# Patient Record
Sex: MALE | Race: WHITE | NOT HISPANIC OR LATINO | URBAN - METROPOLITAN AREA
[De-identification: names, ages, dates, MRNs, and addresses within clinical notes are randomized per-mention and may not be internally consistent; named-entity substitution may affect disease eponyms.]

---

## 2018-02-09 ENCOUNTER — INPATIENT (INPATIENT)
Facility: HOSPITAL | Age: 63
LOS: 0 days | Discharge: HOME | DRG: 247 | End: 2018-02-10
Attending: INTERNAL MEDICINE | Admitting: INTERNAL MEDICINE
Payer: COMMERCIAL

## 2018-02-09 ENCOUNTER — TRANSCRIPTION ENCOUNTER (OUTPATIENT)
Age: 63
End: 2018-02-09

## 2018-02-09 ENCOUNTER — INPATIENT (INPATIENT)
Facility: HOSPITAL | Age: 63
LOS: 0 days | Discharge: ANOTHER HOSPITAL | DRG: 311 | End: 2018-02-09
Attending: FAMILY MEDICINE | Admitting: HOSPITALIST
Payer: COMMERCIAL

## 2018-02-09 VITALS — RESPIRATION RATE: 18 BRPM | HEART RATE: 90 BPM | OXYGEN SATURATION: 99 % | WEIGHT: 220.02 LBS | HEIGHT: 70 IN

## 2018-02-09 VITALS
OXYGEN SATURATION: 100 % | SYSTOLIC BLOOD PRESSURE: 117 MMHG | DIASTOLIC BLOOD PRESSURE: 65 MMHG | RESPIRATION RATE: 14 BRPM | TEMPERATURE: 99 F | HEART RATE: 77 BPM

## 2018-02-09 VITALS
HEART RATE: 79 BPM | RESPIRATION RATE: 18 BRPM | TEMPERATURE: 98 F | OXYGEN SATURATION: 100 % | SYSTOLIC BLOOD PRESSURE: 124 MMHG | WEIGHT: 220.02 LBS | HEIGHT: 70 IN | DIASTOLIC BLOOD PRESSURE: 86 MMHG

## 2018-02-09 DIAGNOSIS — I20.0 UNSTABLE ANGINA: ICD-10-CM

## 2018-02-09 DIAGNOSIS — E78.5 HYPERLIPIDEMIA, UNSPECIFIED: ICD-10-CM

## 2018-02-09 DIAGNOSIS — R55 SYNCOPE AND COLLAPSE: ICD-10-CM

## 2018-02-09 DIAGNOSIS — Z29.9 ENCOUNTER FOR PROPHYLACTIC MEASURES, UNSPECIFIED: ICD-10-CM

## 2018-02-09 DIAGNOSIS — I10 ESSENTIAL (PRIMARY) HYPERTENSION: ICD-10-CM

## 2018-02-09 LAB
ALBUMIN SERPL ELPH-MCNC: 4.2 G/DL — SIGNIFICANT CHANGE UP (ref 3.3–5)
ALP SERPL-CCNC: 50 U/L — SIGNIFICANT CHANGE UP (ref 40–120)
ALT FLD-CCNC: 121 U/L — HIGH (ref 12–78)
ANION GAP SERPL CALC-SCNC: 7 MMOL/L — SIGNIFICANT CHANGE UP (ref 5–17)
AST SERPL-CCNC: 50 U/L — HIGH (ref 15–37)
BILIRUB SERPL-MCNC: 0.7 MG/DL — SIGNIFICANT CHANGE UP (ref 0.2–1.2)
BUN SERPL-MCNC: 25 MG/DL — HIGH (ref 7–23)
CALCIUM SERPL-MCNC: 8.9 MG/DL — SIGNIFICANT CHANGE UP (ref 8.5–10.1)
CHLORIDE SERPL-SCNC: 100 MMOL/L — SIGNIFICANT CHANGE UP (ref 96–108)
CK SERPL-CCNC: 103 U/L — SIGNIFICANT CHANGE UP (ref 26–308)
CK SERPL-CCNC: 138 U/L — SIGNIFICANT CHANGE UP (ref 26–308)
CO2 SERPL-SCNC: 29 MMOL/L — SIGNIFICANT CHANGE UP (ref 22–31)
CREAT SERPL-MCNC: 1.3 MG/DL — SIGNIFICANT CHANGE UP (ref 0.5–1.3)
GLUCOSE SERPL-MCNC: 122 MG/DL — HIGH (ref 70–99)
HCT VFR BLD CALC: 42.4 % — SIGNIFICANT CHANGE UP (ref 39–50)
HGB BLD-MCNC: 14.9 G/DL — SIGNIFICANT CHANGE UP (ref 13–17)
INR BLD: 1.01 RATIO — SIGNIFICANT CHANGE UP (ref 0.88–1.16)
MCHC RBC-ENTMCNC: 31.5 PG — SIGNIFICANT CHANGE UP (ref 27–34)
MCHC RBC-ENTMCNC: 35.2 GM/DL — SIGNIFICANT CHANGE UP (ref 32–36)
MCV RBC AUTO: 89.5 FL — SIGNIFICANT CHANGE UP (ref 80–100)
PLATELET # BLD AUTO: 212 K/UL — SIGNIFICANT CHANGE UP (ref 150–400)
POTASSIUM SERPL-MCNC: 4.1 MMOL/L — SIGNIFICANT CHANGE UP (ref 3.5–5.3)
POTASSIUM SERPL-SCNC: 4.1 MMOL/L — SIGNIFICANT CHANGE UP (ref 3.5–5.3)
PROT SERPL-MCNC: 7.8 G/DL — SIGNIFICANT CHANGE UP (ref 6–8.3)
PROTHROM AB SERPL-ACNC: 11 SEC — SIGNIFICANT CHANGE UP (ref 9.8–12.7)
RBC # BLD: 4.74 M/UL — SIGNIFICANT CHANGE UP (ref 4.2–5.8)
RBC # FLD: 10.6 % — SIGNIFICANT CHANGE UP (ref 10.3–14.5)
SODIUM SERPL-SCNC: 136 MMOL/L — SIGNIFICANT CHANGE UP (ref 135–145)
TROPONIN I SERPL-MCNC: <.015 NG/ML — SIGNIFICANT CHANGE UP (ref 0.01–0.04)
TROPONIN I SERPL-MCNC: <.015 NG/ML — SIGNIFICANT CHANGE UP (ref 0.01–0.04)
WBC # BLD: 7 K/UL — SIGNIFICANT CHANGE UP (ref 3.8–10.5)
WBC # FLD AUTO: 7 K/UL — SIGNIFICANT CHANGE UP (ref 3.8–10.5)

## 2018-02-09 PROCEDURE — 93010 ELECTROCARDIOGRAM REPORT: CPT | Mod: 76

## 2018-02-09 PROCEDURE — 80053 COMPREHEN METABOLIC PANEL: CPT

## 2018-02-09 PROCEDURE — 93005 ELECTROCARDIOGRAM TRACING: CPT

## 2018-02-09 PROCEDURE — 84484 ASSAY OF TROPONIN QUANT: CPT

## 2018-02-09 PROCEDURE — 92928 PRQ TCAT PLMT NTRAC ST 1 LES: CPT | Mod: LD

## 2018-02-09 PROCEDURE — 99223 1ST HOSP IP/OBS HIGH 75: CPT | Mod: AI,GC

## 2018-02-09 PROCEDURE — 71045 X-RAY EXAM CHEST 1 VIEW: CPT

## 2018-02-09 PROCEDURE — 85027 COMPLETE CBC AUTOMATED: CPT

## 2018-02-09 PROCEDURE — 99285 EMERGENCY DEPT VISIT HI MDM: CPT

## 2018-02-09 PROCEDURE — 70450 CT HEAD/BRAIN W/O DYE: CPT

## 2018-02-09 PROCEDURE — 93458 L HRT ARTERY/VENTRICLE ANGIO: CPT | Mod: 26,59

## 2018-02-09 PROCEDURE — 93010 ELECTROCARDIOGRAM REPORT: CPT

## 2018-02-09 PROCEDURE — 92921: CPT | Mod: LD

## 2018-02-09 PROCEDURE — 71045 X-RAY EXAM CHEST 1 VIEW: CPT | Mod: 26

## 2018-02-09 PROCEDURE — 85610 PROTHROMBIN TIME: CPT

## 2018-02-09 PROCEDURE — 99285 EMERGENCY DEPT VISIT HI MDM: CPT | Mod: 25

## 2018-02-09 PROCEDURE — 82550 ASSAY OF CK (CPK): CPT

## 2018-02-09 PROCEDURE — 12345: CPT | Mod: NC

## 2018-02-09 PROCEDURE — 70450 CT HEAD/BRAIN W/O DYE: CPT | Mod: 26

## 2018-02-09 RX ORDER — CHLORTHALIDONE 50 MG
1 TABLET ORAL
Qty: 0 | Refills: 0 | DISCHARGE
Start: 2018-02-09

## 2018-02-09 RX ORDER — ATORVASTATIN CALCIUM 80 MG/1
1 TABLET, FILM COATED ORAL
Qty: 0 | Refills: 0 | COMMUNITY

## 2018-02-09 RX ORDER — LISINOPRIL 2.5 MG/1
1 TABLET ORAL
Qty: 0 | Refills: 0 | DISCHARGE
Start: 2018-02-09

## 2018-02-09 RX ORDER — CHLORTHALIDONE 50 MG
1 TABLET ORAL
Qty: 0 | Refills: 0 | COMMUNITY

## 2018-02-09 RX ORDER — ATORVASTATIN CALCIUM 80 MG/1
40 TABLET, FILM COATED ORAL AT BEDTIME
Qty: 0 | Refills: 0 | Status: DISCONTINUED | OUTPATIENT
Start: 2018-02-09 | End: 2018-02-09

## 2018-02-09 RX ORDER — LISINOPRIL 2.5 MG/1
40 TABLET ORAL DAILY
Qty: 0 | Refills: 0 | Status: DISCONTINUED | OUTPATIENT
Start: 2018-02-09 | End: 2018-02-09

## 2018-02-09 RX ORDER — CLOPIDOGREL BISULFATE 75 MG/1
75 TABLET, FILM COATED ORAL DAILY
Qty: 0 | Refills: 0 | Status: DISCONTINUED | OUTPATIENT
Start: 2018-02-09 | End: 2018-02-10

## 2018-02-09 RX ORDER — AMLODIPINE BESYLATE 2.5 MG/1
1 TABLET ORAL
Qty: 0 | Refills: 0 | DISCHARGE
Start: 2018-02-09

## 2018-02-09 RX ORDER — ASPIRIN/CALCIUM CARB/MAGNESIUM 324 MG
325 TABLET ORAL ONCE
Qty: 0 | Refills: 0 | Status: COMPLETED | OUTPATIENT
Start: 2018-02-09 | End: 2018-02-09

## 2018-02-09 RX ORDER — ASPIRIN/CALCIUM CARB/MAGNESIUM 324 MG
81 TABLET ORAL DAILY
Qty: 0 | Refills: 0 | Status: DISCONTINUED | OUTPATIENT
Start: 2018-02-10 | End: 2018-02-10

## 2018-02-09 RX ORDER — AMLODIPINE BESYLATE 2.5 MG/1
5 TABLET ORAL DAILY
Qty: 0 | Refills: 0 | Status: DISCONTINUED | OUTPATIENT
Start: 2018-02-09 | End: 2018-02-09

## 2018-02-09 RX ORDER — ASPIRIN/CALCIUM CARB/MAGNESIUM 324 MG
81 TABLET ORAL DAILY
Qty: 0 | Refills: 0 | Status: DISCONTINUED | OUTPATIENT
Start: 2018-02-09 | End: 2018-02-09

## 2018-02-09 RX ORDER — AMLODIPINE BESYLATE 2.5 MG/1
1 TABLET ORAL
Qty: 0 | Refills: 0 | COMMUNITY

## 2018-02-09 RX ORDER — CHLORTHALIDONE 50 MG
25 TABLET ORAL EVERY OTHER DAY
Qty: 0 | Refills: 0 | Status: DISCONTINUED | OUTPATIENT
Start: 2018-02-09 | End: 2018-02-09

## 2018-02-09 RX ORDER — ASPIRIN/CALCIUM CARB/MAGNESIUM 324 MG
1 TABLET ORAL
Qty: 0 | Refills: 0 | COMMUNITY
Start: 2018-02-09

## 2018-02-09 RX ORDER — LISINOPRIL 2.5 MG/1
1 TABLET ORAL
Qty: 0 | Refills: 0 | COMMUNITY

## 2018-02-09 RX ORDER — HYDROCHLOROTHIAZIDE 25 MG
0 TABLET ORAL
Qty: 0 | Refills: 0 | COMMUNITY

## 2018-02-09 RX ORDER — ASPIRIN/CALCIUM CARB/MAGNESIUM 324 MG
1 TABLET ORAL
Qty: 0 | Refills: 0 | COMMUNITY

## 2018-02-09 RX ADMIN — LISINOPRIL 40 MILLIGRAM(S): 2.5 TABLET ORAL at 07:37

## 2018-02-09 RX ADMIN — AMLODIPINE BESYLATE 5 MILLIGRAM(S): 2.5 TABLET ORAL at 07:38

## 2018-02-09 RX ADMIN — Medication 325 MILLIGRAM(S): at 04:26

## 2018-02-09 NOTE — ED ADULT NURSE NOTE - CHPI ED SYMPTOMS NEG
no vomiting/no back pain/no nausea/no chest pain/no diaphoresis/no fever/no chills/no cough/no syncope/no shortness of breath

## 2018-02-09 NOTE — H&P ADULT - NSHPREVIEWOFSYSTEMS_GEN_ALL_CORE
Constitutional: denies fever, chills, diaphoresis   HEENT: denies blurry vision, difficulty hearing  Respiratory: denies SOB, LANGFORD, cough, sputum production, wheezing, hemoptysis  Cardiovascular: +palpitations denies CP, edema  Gastrointestinal: denies nausea, vomiting, diarrhea, constipation, abdominal pain, melena, hematochezia   Genitourinary: denies dysuria, frequency, urgency, hematuria   Skin/Breast: denies rash, itching  Musculoskeletal: denies myalgias, joint swelling  Neurologic: +leg weakness, denies headache, dizziness, paresthesias, numbness/tingling

## 2018-02-09 NOTE — DISCHARGE NOTE ADULT - ADDITIONAL INSTRUCTIONS
Follow up with your cardiologist and primary care provider in 1-2 weeks. Do not stop you Aspirin or Plavix unless instructed to do so by your cardiologist.   Call the Cardiology office [phone] to confirm your next catheterization date  Follow up with your cardiologist and primary care provider in 1-2 weeks.

## 2018-02-09 NOTE — DISCHARGE NOTE ADULT - PATIENT PORTAL LINK FT
You can access the [facility] Patient Portal, offered by [facility], by registering with the following website: [email or web address]

## 2018-02-09 NOTE — DISCHARGE NOTE ADULT - HOSPITAL COURSE
62 year old male with PMH of HTN, fatty Liver, gout presents to [facility] ED on 2/9/18 with chest discomfort and disorientation, admitted with r/o ACS, and presyncope   CT head neg, troponin x 2 Negative. 62 year old male with PMH of HTN, fatty Liver, gout presents to [facility] ED on 2/9/18 with chest discomfort and disorientation, admitted with r/o ACS, and presyncope   CT head neg, troponin x 2 Negative.     2/9 cardiac cath with one prox LAD stent, one mid LAD stent, POBA to the D1.

## 2018-02-09 NOTE — H&P ADULT - ASSESSMENT
62 year old male with PMH of HTN, fatty Liver, gout presents with chest discomfort and disorientation, a/w palpitations r/o ACS, and presyncope

## 2018-02-09 NOTE — H&P ADULT - ATTENDING COMMENTS
61yo m who recently got a cardiac workup recently being admitted for near syncope.  During his cardiac workup in [place], he did have Vtach and Aflutter per patient.  Cardio consulted to evaluate patient here. Echo ordered. Trending troponins.   Previous cardiac workup results can be obtained by Halima Lucas at Work number [phone] or Cell [phone]. 63yo m who recently got a cardiac workup recently being admitted for near syncope.  During his cardiac workup in [place], he did have Vtach and Aflutter per patient.  Cardio consulted to evaluate patient here. Echo ordered. Trending troponins.   Previous cardiac workup results can be obtained from his nurse Halima Lucas at Work number [phone] or Cell [phone].

## 2018-02-09 NOTE — DISCHARGE NOTE ADULT - CARE PROVIDER_API CALL
Rufus Lemus (MD), Cardiovascular Disease  [address]  3rd [place] [address]  Phone: [phone]  Fax: [phone]    Jett Frey (MD), Cardiology; Internal Medicine; Interventional Cardiology  [facility] Dept of Cardiology  [address] [place] [address]  Phone: [phone]  Fax: [phone]

## 2018-02-09 NOTE — DISCHARGE NOTE ADULT - CARE PROVIDER_API CALL
Nathen Anthony), Cardiovascular Disease; Interventional Cardiology  [address] [place] [address]  Phone: [phone]  Fax: [phone]    Jusitn White), Infectious Disease; Internal Medicine  [address] [place] [address]  3rd [place] [address]  Phone: [phone]  Fax: [phone]

## 2018-02-09 NOTE — ED PROVIDER NOTE - OBJECTIVE STATEMENT
61 y/o WF C/O palpitations x 5 minutes and near syncope. He experienced blurry vision and left arm weakness. The symptoms occurred two hours ago while he was in [place]. He drove to  where he resides and his symptoms are resolved. No chest pain, no SOB, no Headache, No focal neuro change. He took ASA 81mg. On 2/6 he  had a stress test and at max /min his cardiologist informed him he developed non sustained V-Tach, 5 beats and single  couplet. He stated that he was asymptomatic during those episodes.

## 2018-02-09 NOTE — H&P ADULT - HISTORY OF PRESENT ILLNESS
62 year old male with PMH of HTN, fatty Liver, gout presents with chest discomfort and disorientation. Patient was lying on the couch when he began feeling chest discomfort, and experiencing blurry vision, denied pain, denied association with breathing. Patient described discomfort as though his heart was racing, w/ diaphoresis and urgency to use bathroom, reports sitting down to urinate on the toilet bowl and having an unplanned/uncontrolled bowel movement. Stood up and felt disoriented, legs and arms felt weak, and he noticed he was feeling out of breath. Denied falling, head trauma, LOC, dizziness. 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 As per pt during the third stage of a cardiac stress test in which patient's heart rate was between 150-180 he was found to have two episodes of Vtach (2 beats, and 5 beats), and briefly was in atrial flutter. His echocardiogram did not reveal any abnormalities although his carotid dopplers showed mildly calcified plaque deposits b/l. CT scan showed fatty liver, calcified aorta and normal kidneys. Patient's cholesterol and triglycerides were found to be elevated and he was started on a statin, with the first dose taken today. After the stress test a week ago patient noted feeling exhausted but did not notice any symptoms until yesterday when intermittently throughout the day he felt the same chest discomfort he reports today, without associated diaphoresis, LANGFORD, LE weakness which all started today. Currently patient reports all symptoms have resolved, denies CP, SOB, fever, chills, n/v/d  In ED vitals were HR: 79 BP: 152/68 RR: 20 SpO2: 98%   Labs sig for AST 50, , troponin negative, CK wnl  CT head No acute intracranial hemorrhage, mass effect, or evidence of acute   vascular territorial infarction.  CXR: No acute infectious process- unoffcial read  EKG: NSR @ 84bpm  In ED was given aspirin 325

## 2018-02-09 NOTE — H&P ADULT - NSHPPHYSICALEXAM_GEN_ALL_CORE
Physical Exam:  General: Well developed, well nourished, NAD  HEENT: NCAT, PERRLA, EOMI bl, moist mucous membranes   Neck: Supple, nontender, no mass  Neurology: A&Ox3, nonfocal, CN II-XII grossly intact  Respiratory: CTA B/L, No W/R/R  CV: RRR, +S1/S2, no murmurs, rubs or gallops  Abdominal: Soft, NT, ND +BSx4  Extremities: No C/C/E, + peripheral pulses  MSK: No joint erythema or warmth, no joint swelling   Skin: warm, dry, normal color, no rash or abnormal lesions

## 2018-02-09 NOTE — CONSULT NOTE ADULT - SUBJECTIVE AND OBJECTIVE BOX
CHIEF COMPLAINT: Patient is a 62y old  Male who presents with a chief complaint of chest discomfort and disorientation (09 Feb 2018 04:20)      HPI:  62 year old male with PMH of HTN, fatty Liver, gout presents with chest discomfort and disorientation. Patient was lying on the couch when he began feeling chest discomfort, and experiencing blurry vision, denied pain, denied association with breathing. Patient described discomfort as though his heart was racing, w/ diaphoresis and urgency to use bathroom, reports sitting down to urinate on the toilet bowl and having an unplanned/uncontrolled bowel movement.  legs and arms felt weak, and felt heart racing for several minutes. Denied falling, head trauma, LOC, dizziness. 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 As per pt during the third stage of a cardiac stress test in which patient's heart rate was between 150-180 he was found to have two episodes of Vtach (2 beats, and 5 beats), and briefly was in atrial flutter. He denies rest or exertional chest pressure/tightness/heaviness, dyspnea, edema His echocardiogram did not reveal any abnormalities although his carotid dopplers showed mildly calcified plaque deposits b/l. CT scan showed fatty liver, calcified aorta and normal kidneys. Patient's cholesterol and triglycerides were found to be elevated and he was started on a statin, with the first dose taken today.  Currently patient reports all symptoms have resolved, denies CP, SOB, fever, chills, n/v/d  In ED vitals were HR: 79 BP: 152/68 RR: 20 SpO2: 98%   Labs sig for AST 50, , troponin negative, CK wnl  CT head No acute intracranial hemorrhage, mass effect, or evidence of acute   vascular territorial infarction.  CXR: No acute infectious process- unoffcial read  EKG: NSR @ 84bpm  In ED was given aspirin 325 (09 Feb 2018 04:20)      PAST MEDICAL & SURGICAL HISTORY:  Gout attack  Fatty liver  Hypertension  No significant past surgical history      SOCIAL HISTORY:    FAMILY HISTORY: FAMILY HISTORY:  Family history of cerebrovascular accident (CVA) (Father)  Family history of cardiac pacemaker (Sibling)  Family history of acute myocardial infarction (Father, Mother)      MEDICATIONS  (STANDING):  amLODIPine   Tablet 5 milliGRAM(s) Oral daily  aspirin enteric coated 81 milliGRAM(s) Oral daily  atorvastatin 40 milliGRAM(s) Oral at bedtime  chlorthalidone 25 milliGRAM(s) Oral every other day  lisinopril 40 milliGRAM(s) Oral daily    MEDICATIONS  (PRN):      Allergies    No Known Allergies    Intolerances        REVIEW OF SYSTEMS:    CONSTITUTIONAL: No weakness, fevers or chills  EYES: No visual changes, No diplopia  NECK: No pain or stiffness  RESPIRATORY: No cough, wheezing, hemoptysis; No shortness of breath  CARDIOVASCULAR: No chest pain  GASTROINTESTINAL: No abdominal pain. No nausea, vomiting, or hematemesis; No diarrhea or constipation. No melena or hematochezia.one uncontrolled BM  GENITOURINARY: No dysuria, frequency or hematuria  NEUROLOGICAL: No numbness or weakness  SKIN: No itching or rash  All other review of systems is negative unless indicated above    VITAL SIGNS:   Vital Signs Last 24 Hrs  T(C): 36.9 (09 Feb 2018 06:18), Max: 36.9 (09 Feb 2018 06:18)  T(F): 98.5 (09 Feb 2018 06:18), Max: 98.5 (09 Feb 2018 06:18)  HR: 64 (09 Feb 2018 07:35) (64 - 90)  BP: 114/42 (09 Feb 2018 07:35) (114/42 - 152/68)  BP(mean): --  RR: 15 (09 Feb 2018 07:35) (15 - 20)  SpO2: 97% (09 Feb 2018 07:35) (97% - 99%)    I&O's Summary      PHYSICAL EXAM:    Constitutional: NAD, awake and alert, well-developed  Eyes:  EOMI,  Pupils round, no lesions  Pulmonary: Non-labored, breath sounds are clear bilaterally, No wheezing, rales or rhonchi  Cardiovascular: PMI not palpable non-displaced Regular S1 and S2, no murmurs, rubs, gallops or clicks  Gastrointestinal: Bowel Sounds present, soft, nontender.   Extremities: No lower extremity clubbing ,cyanosis or edema  Lymph: No peripheral edema. No cervical lymphadenopathy.  Neurological: Alert, no focal deficits  Skin: No rashes.  No cyanosis.  Psych:  Mood & affect appropriate     LABS: All Labs Reviewed:                        14.9   7.0   )-----------( 212      ( 09 Feb 2018 02:43 )             42.4     09 Feb 2018 02:43    136    |  100    |  25     ----------------------------<  122    4.1     |  29     |  1.30     Ca    8.9        09 Feb 2018 02:43    TPro  7.8    /  Alb  4.2    /  TBili  0.7    /  DBili  x      /  AST  50     /  ALT  121    /  AlkPhos  50     09 Feb 2018 02:43    PT/INR - ( 09 Feb 2018 02:43 )   PT: 11.0 sec;   INR: 1.01 ratio           CARDIAC MARKERS ( 09 Feb 2018 02:43 )  <.015 ng/mL / x     / 138 U/L / x     / x          Blood Culture:       EKG:  nsr 84bpm, normal axis/intervals-normal CHIEF COMPLAINT: Patient is a 62y old  Male who presents with a chief complaint of chest discomfort and disorientation (09 Feb 2018 04:20)      HPI:  62 year old male with PMH of HTN, fatty Liver, gout presents with chest discomfort and disorientation. Patient was lying on the couch when he began feeling chest discomfort, and experiencing blurry vision, denied pain, denied association with breathing. Patient described discomfort as though his heart was racing, w/ diaphoresis and urgency to use bathroom, reports sitting down to urinate on the toilet bowl and having an unplanned/uncontrolled bowel movement.  legs and arms felt weak, and felt heart racing for several minutes. Denied falling, head trauma, LOC, dizziness. 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 As per pt during the third stage of a cardiac stress test in which patient's heart rate was between 150-180 he was found to have two episodes of Vtach (2 beats, and 5 beats), and briefly was in atrial flutter. He denies rest or exertional chest pressure/tightness/heaviness, dyspnea, edema His echocardiogram did not reveal any abnormalities although his carotid dopplers showed mildly calcified plaque deposits b/l. CT scan showed fatty liver, calcified aorta and normal kidneys. Patient's cholesterol and triglycerides were found to be elevated and he was started on a statin, with the first dose taken today.  Currently patient reports all symptoms have resolved, denies CP, SOB, fever, chills, n/v/d. While driving home from [place] for 1.5hr he felt weakness in both legs and arms and "irritation" feeling in chest    In ED vitals were HR: 79 BP: 152/68 RR: 20 SpO2: 98%   Labs sig for AST 50, , troponin negative, CK wnl  CT head No acute intracranial hemorrhage, mass effect, or evidence of acute   vascular territorial infarction.  CXR: No acute infectious process- unoffcial read  EKG: NSR @ 84bpm  In ED was given aspirin 325 (09 Feb 2018 04:20)      PAST MEDICAL & SURGICAL HISTORY:  Gout attack  Fatty liver  Hypertension  No significant past surgical history      SOCIAL HISTORY:    FAMILY HISTORY: FAMILY HISTORY:  Family history of cerebrovascular accident (CVA) (Father)  Family history of cardiac pacemaker (Sibling)  Family history of acute myocardial infarction (Father, Mother)      MEDICATIONS  (STANDING):  amLODIPine   Tablet 5 milliGRAM(s) Oral daily  aspirin enteric coated 81 milliGRAM(s) Oral daily  atorvastatin 40 milliGRAM(s) Oral at bedtime  chlorthalidone 25 milliGRAM(s) Oral every other day  lisinopril 40 milliGRAM(s) Oral daily    MEDICATIONS  (PRN):      Allergies    No Known Allergies    Intolerances        REVIEW OF SYSTEMS:    CONSTITUTIONAL: No weakness, fevers or chills  EYES: No visual changes, No diplopia  NECK: No pain or stiffness  RESPIRATORY: No cough, wheezing, hemoptysis; No shortness of breath  CARDIOVASCULAR: No chest pain  GASTROINTESTINAL: No abdominal pain. No nausea, vomiting, or hematemesis; No diarrhea or constipation. No melena or hematochezia.one uncontrolled BM  GENITOURINARY: No dysuria, frequency or hematuria  NEUROLOGICAL: No numbness or weakness  SKIN: No itching or rash  All other review of systems is negative unless indicated above    VITAL SIGNS:   Vital Signs Last 24 Hrs  T(C): 36.9 (09 Feb 2018 06:18), Max: 36.9 (09 Feb 2018 06:18)  T(F): 98.5 (09 Feb 2018 06:18), Max: 98.5 (09 Feb 2018 06:18)  HR: 64 (09 Feb 2018 07:35) (64 - 90)  BP: 114/42 (09 Feb 2018 07:35) (114/42 - 152/68)  BP(mean): --  RR: 15 (09 Feb 2018 07:35) (15 - 20)  SpO2: 97% (09 Feb 2018 07:35) (97% - 99%)    I&O's Summary      PHYSICAL EXAM:    Constitutional: NAD, awake and alert, well-developed  Eyes:  EOMI,  Pupils round, no lesions  Pulmonary: Non-labored, breath sounds are clear bilaterally, No wheezing, rales or rhonchi  Cardiovascular: PMI not palpable non-displaced Regular S1 and S2, no murmurs, rubs, gallops or clicks  Gastrointestinal: Bowel Sounds present, soft, nontender.   Extremities: No lower extremity clubbing ,cyanosis or edema  Lymph: No peripheral edema. No cervical lymphadenopathy.  Neurological: Alert, no focal deficits  Skin: No rashes.  No cyanosis.  Psych:  Mood & affect appropriate     LABS: All Labs Reviewed:                        14.9   7.0   )-----------( 212      ( 09 Feb 2018 02:43 )             42.4     09 Feb 2018 02:43    136    |  100    |  25     ----------------------------<  122    4.1     |  29     |  1.30     Ca    8.9        09 Feb 2018 02:43    TPro  7.8    /  Alb  4.2    /  TBili  0.7    /  DBili  x      /  AST  50     /  ALT  121    /  AlkPhos  50     09 Feb 2018 02:43    PT/INR - ( 09 Feb 2018 02:43 )   PT: 11.0 sec;   INR: 1.01 ratio           CARDIAC MARKERS ( 09 Feb 2018 02:43 )  <.015 ng/mL / x     / 138 U/L / x     / x          Blood Culture:       EKG:  nsr 84bpm, normal axis/intervals-normal

## 2018-02-09 NOTE — H&P ADULT - FAMILY HISTORY
Father  Still living? Unknown  Family history of acute myocardial infarction, Age at diagnosis: Age Unknown  Family history of cerebrovascular accident (CVA), Age at diagnosis: Age Unknown     Mother  Still living? Unknown  Family history of acute myocardial infarction, Age at diagnosis: Age Unknown     Sibling  Still living? Unknown  Family history of cardiac pacemaker, Age at diagnosis: Age Unknown

## 2018-02-09 NOTE — H&P ADULT - NSHPSOCIALHISTORY_GEN_ALL_CORE
Lives at home with wife  Works in [email or web address] in [place]  Does not smoke, used to drink more heavily but now once or twice a week

## 2018-02-09 NOTE — DISCHARGE NOTE ADULT - MEDICATION SUMMARY - MEDICATIONS TO TAKE
I will START or STAY ON the medications listed below when I get home from the hospital:    aspirin 81 mg oral delayed release tablet  -- 1 tab(s) by mouth once a day  home/hospital  -- Indication: For Helping keep stented coronary arteries open    lisinopril 40 mg oral tablet  -- 1 tab(s) by mouth once a day  home/hospital  -- Indication: For Hypertension    atorvastatin 40 mg oral tablet  -- 1 tab(s) by mouth once a day (at bedtime)  home/hospital  -- Indication: For hyperlipidemia    clopidogrel 75 mg oral tablet  -- 1 tab(s) by mouth once a day  -- Indication: For Helping keep stented coronary arteries open    amLODIPine 5 mg oral tablet  -- 1 tab(s) by mouth once a day  home/hospital  -- Indication: For Hypertension    chlorthalidone 25 mg oral tablet  -- 1 tab(s) by mouth every other day  home/hospital  -- Indication: For Hypertension

## 2018-02-09 NOTE — PATIENT PROFILE ADULT. - NS PRO PT RIGHT SUPPORT PERSON
Detail Level: Simple
Add 27299 Cpt? (Important Note: In 2017 The Use Of 13379 Is Being Tracked By Cms To Determine Future Global Period Reimbursement For Global Periods): no
same name as above

## 2018-02-09 NOTE — DISCHARGE NOTE ADULT - CARE PLAN
Principal Discharge DX:	Chest discomfort  Goal:	possible unstable   angina / on asa , statin  Assessment and plan of treatment:	cardiac cath by  [facility]  Secondary Diagnosis:	Hypertension  Assessment and plan of treatment:	on meds stable  Secondary Diagnosis:	Near syncope  Assessment and plan of treatment:	resolved  Secondary Diagnosis:	HLD (hyperlipidemia)  Assessment and plan of treatment:	on meds

## 2018-02-09 NOTE — DISCHARGE NOTE ADULT - CARE PLAN
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  Assessment and plan of treatment:	Do not stop you Aspirin or Plavix unless instructed to do so by your cardiologist.   No heavy lifting or pushing/pulling with procedure arm for 2 weeks. No driving for 2 days. You may shower 24 hours following the procedure but avoid baths/swimming for 1 week. Check your wrist site for bleeding and/or swelling daily following procedure and call your doctor immediately if it occurs or if you experience increased pain at the site. Follow up with your cardiologist in 1-2 weeks. You may call [facility] if you have any questions/concerns regarding your procedure [phone].  Secondary Diagnosis:	Hypertension, unspecified type  Goal:	Your blood pressure will be controlled.  Assessment and plan of treatment:	Continue with your blood pressure medications; eat a heart healthy diet with low salt diet; exercise regularly (consult with your physician or cardiologist first); maintain a heart healthy weight; if you smoke - quit (A resource to help you stop smoking is the [employer] – phone number 792-789-5534.); include healthy ways to manage stress. Continue to follow with your primary care physician or cardiologist.  Secondary Diagnosis:	Hyperlipidemia, unspecified hyperlipidemia type  Goal:	Your LDL cholesterol will be less than 70mg/dL  Assessment and plan of treatment:	Continue with your cholesterol medications. Eat a heart healthy diet that is low in saturated fats and salt, and includes whole grains, fruits, vegetables and lean protein; exercise regularly (consult with your physician or cardiologist first); maintain a heart healthy weight; if you smoke - quit (A resource to help you stop smoking is the [employer] Tobacco Control – phone number 539-930-8554.). Continue to follow with your primary physician or cardiologist.

## 2018-02-09 NOTE — H&P ADULT - PROBLEM SELECTOR PLAN 4
EOMI; PERRL; no drainage or redness DVT ppx: SCD  IMPROVE VTE Individual Risk Assessment          RISK                                                          Points  [  ] Previous VTE                                                3  [  ] Thrombophilia                                             2  [  ] Lower limb paralysis                                   2        (unable to hold up >15 seconds)    [  ] Current Cancer                                             2         (within 6 months)  [  ] Immobilization > 24 hrs                              1  [  ] ICU/CCU stay > 24 hours                             1  [ X ] Age > 60                                                         1    IMPROVE VTE Score: 1

## 2018-02-09 NOTE — H&P CARDIOLOGY - PMH
Fatty liver    Gout attack    Hypertension Fatty liver    Gout attack    HLD (hyperlipidemia)    Hypertension    Renal stones

## 2018-02-09 NOTE — DISCHARGE NOTE ADULT - ADDITIONAL INSTRUCTIONS
pmd dr adamson notified dc / transfer plan - fu in 1wk after dc from [place] . transfer to [place] for cardiac cath - under dr armen severino  service.

## 2018-02-09 NOTE — DISCHARGE NOTE ADULT - CARE PROVIDERS DIRECT ADDRESSES
,DirectAddress_Unknown,[email or web address]

## 2018-02-09 NOTE — H&P ADULT - PROBLEM SELECTOR PLAN 1
Admit to TELE  CT head neg, troponin 1st set neg  Keep BP within therapeutic range  check orthostatics  fall precautions  TTE and carotid dopplers  Cardiac consult Dr. Lemus, recs appreciated Admit to TELE  CT head neg, troponin 1st set neg  Keep BP within therapeutic range  check orthostatics  fall precautions  TTE  Cardiac consult Dr. Lemus, recs appreciated

## 2018-02-09 NOTE — ED ADULT NURSE REASSESSMENT NOTE - NS ED NURSE REASSESS COMMENT FT1
patient to be transferred as per phone call from Cath Lab at [place] arrangements made by Dr. Lemus nurse not aware of transfer no transfer paper work done Dr Lemus called. Report given to Dasia who states they should be picking patient up about 10:30 patient to remain NPO except meds
report from previous shift patient sleeping easily awakened pt admitted awaiting bed assignment

## 2018-02-09 NOTE — DISCHARGE NOTE ADULT - PATIENT PORTAL LINK FT
You can access the [facility], offered by [facility], by registering with the following website: [email or web address]

## 2018-02-09 NOTE — DISCHARGE NOTE ADULT - PLAN OF CARE
possible unstable   angina / on asa , statin cardiac cath by  [facility] on meds stable resolved on meds

## 2018-02-09 NOTE — ED ADULT NURSE NOTE - OBJECTIVE STATEMENT
62 year old male presents to the ED with irregular heart beat. A+O x 4. States he was watching tv when he suddenly "didn't feel right." Unable to focus, became dizzy, felt palpitations. Had a stress test this past week on tuesday 2/6/18. Was told he had 2 episodes of v-tach. One episode with 2 beats, the other with 5 beats. Pt states he was asymptomatic during these v-tach episodes. Pt reports palpitations of the head, but denies headache or dizziness now. Reports he still feels jittery. S1/S2. NSR on the monitor. Lungs clear oliva. Resp even and unlabored. skin flushed but warm and intact. no swelling/ edema noted oliva LE's. abdomen obese but soft and non tender. PMH of HTN and hyperlipidemia.

## 2018-02-09 NOTE — ED PROVIDER NOTE - CARE PLAN
Principal Discharge DX:	Near syncope  Secondary Diagnosis:	TIA (transient ischemic attack)  Secondary Diagnosis:	Palpitations

## 2018-02-09 NOTE — CONSULT NOTE ADULT - ASSESSMENT
no chest pain, EKG changes or cardiac enzyme elevation-no evidence of ACS  r/o arrhythmia- arranged 30 day home telemetry monitoring as out patient  weakness in extremities without syncope.   f/u with me in office within 3-4 days of discharge  recommend to ambulate pt. If stable, he will f/u as out pt no chest pain, EKG changes or cardiac enzyme elevation-no evidence of ACS  unclear etiology of fatigue, weakness in all extremities and "irritation" in chest  risk vs benefit d/w pt. All questions answered.  arranged cardiac cath at [facility] today.  If cath is negative, will arrange home telemetry monitoring as out pt

## 2018-02-09 NOTE — DISCHARGE NOTE ADULT - MEDICATION SUMMARY - MEDICATIONS TO TAKE
I will START or STAY ON the medications listed below when I get home from the hospital:    aspirin 81 mg oral delayed release tablet  -- 1 tab(s) by mouth once a day  -- Indication: For unstable angina     lisinopril 40 mg oral tablet  -- 1 tab(s) by mouth once a day  -- Indication: For Hypertension    atorvastatin 40 mg oral tablet  -- 1 tab(s) by mouth once a day (at bedtime)  -- Indication: For HL d    amLODIPine 5 mg oral tablet  -- 1 tab(s) by mouth once a day  -- Indication: For Htn    chlorthalidone 25 mg oral tablet  -- 1 tab(s) by mouth every other day  -- Indication: For Htn

## 2018-02-09 NOTE — H&P CARDIOLOGY - HISTORY OF PRESENT ILLNESS
62 year old male with PMH of HTN, fatty Liver, gout presents to [facility] on 2/9/18 with chest discomfort and disorientation. Patient was lying on the couch when he began feeling chest discomfort, and experiencing blurry vision, denied pain, denied association with breathing. Patient described discomfort as though his heart was racing, w/ diaphoresis and urgency to use bathroom, reports sitting down to urinate on the toilet bowl and having an unplanned/uncontrolled bowel movement. Stood up and felt disoriented, legs and arms felt weak, and he noticed he was feeling out of breath. Denied falling, head trauma, LOC, dizziness.     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 As per pt during the third stage of a cardiac stress test in which patient's heart rate was between 150-180 he was found to have two episodes of Vtach (2 beats, and 5 beats), and briefly was in atrial flutter. His echocardiogram did not reveal any abnormalities although his carotid dopplers showed mildly calcified plaque deposits b/l.     CT scan showed fatty liver, calcified aorta and normal kidneys. Patient's cholesterol and triglycerides were found to be elevated and he was started on a statin, with the first dose taken today. After the stress test a week ago patient noted feeling exhausted but did not notice any symptoms until yesterday when intermittently throughout the day he felt the same chest discomfort he reports today, without associated diaphoresis, LANGFORD, LE weakness which all started today. Currently patient reports all symptoms have resolved, denies CP, SOB, fever, chills, n/v/d    CT head No acute intracranial hemorrhage, mass effect, or evidence of acute   vascular territorial infarction.  CXR: No acute infectious process    Pt was transferred to [facility] for Cardiac Cath. 62 year old male with PMH of HTN, fatty Liver, gout presents to [facility] on 2/9/18 with chest discomfort and disorientation. Patient was lying on the couch when he began feeling chest discomfort, and experiencing blurry vision, denied pain, denied association with breathing. Patient described discomfort as though his heart was racing, w/ diaphoresis and urgency to use bathroom, reports sitting down to urinate on the toilet bowl and having an unplanned/uncontrolled bowel movement. Stood up and felt disoriented, legs and arms felt weak, and he noticed he was feeling out of breath. Denied falling, head trauma, LOC, dizziness.     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 As per pt during the third stage of a cardiac stress test in which patient's heart rate was between 150-180 he was found to have two episodes of Vtach (2 beats, and 5 beats), and briefly was in atrial flutter. His echocardiogram did not reveal any abnormalities although his carotid dopplers showed mildly calcified plaque deposits b/l.     CT scan showed fatty liver, calcified aorta and normal kidneys. Patient's cholesterol and triglycerides were found to be elevated and he was started on a statin, with the first dose taken today. After the stress test a week ago patient noted feeling exhausted but did not notice any symptoms until yesterday when intermittently throughout the day he felt the same chest discomfort he reports today, without associated diaphoresis, LANGFORD, LE weakness which all started today. Currently patient reports all symptoms have resolved, denies CP, SOB, fever, chills, n/v/d    CT head No acute intracranial hemorrhage, mass effect, or evidence of acute   vascular territorial infarction.  CXR: No acute infectious process    Pt was transferred to [facility] for Cardiac Cath.     Cardiologist: Dr Lemus

## 2018-02-09 NOTE — DISCHARGE NOTE ADULT - HOSPITAL COURSE
62 year old male with PMH of HTN, fatty Liver, gout presents with chest discomfort and disorientation,  admitted with ro ACS, and presyncope- hosp course     ·Near syncope possible cardiac origin with chest discomfort secondary to  possible  unstable angina   CT head neg, troponin 1st set neg  check orthostatics  fall precautions  TTE  pending     Cardiac consult Dr. Lemus,  saw pt next day as per him will need cardiac cath for unstable angina  , on asa , statin  ,  transfer [facility].  physical exam  2-9-18 dc   Vital Signs Last 24 Hrs  T(C): 36.9 (09 Feb 2018 06:18), Max: 36.9 (09 Feb 2018 06:18)  T(F): 98.5 (09 Feb 2018 06:18), Max: 98.5 (09 Feb 2018 06:18)  HR: 64 (09 Feb 2018 07:35) (64 - 90)  BP: 114/42 (09 Feb 2018 07:35) (114/42 - 152/68)  BP(mean): --  RR: 15 (09 Feb 2018 07:35) (15 - 20)  SpO2: 97% (09 Feb 2018 07:35) (97% - 99%)GEN no distress , HEENT nt/nc , perrla , CVS s1s2 no tachy , CHEST bl air entery  present  , CNS aao/3 , GI soft , bs present ,  intact , EXT no edema, pedal pulse present , SKIN no rash.      pmd dr adamson notified dc / transfer plan in length . cardio dr Cary to fu out pt .

## 2018-02-09 NOTE — DISCHARGE NOTE ADULT - PLAN OF CARE
Patient remains chest pain free and understands post cath discharge instructions. Do not stop you Aspirin or Plavix unless instructed to do so by your cardiologist.   No heavy lifting or pushing/pulling with procedure arm for 2 weeks. No driving for 2 days. You may shower 24 hours following the procedure but avoid baths/swimming for 1 week. Check your wrist site for bleeding and/or swelling daily following procedure and call your doctor immediately if it occurs or if you experience increased pain at the site. Follow up with your cardiologist in 1-2 weeks. You may call [facility] if you have any questions/concerns regarding your procedure [phone]. Your blood pressure will be controlled. Continue with your blood pressure medications; eat a heart healthy diet with low salt diet; exercise regularly (consult with your physician or cardiologist first); maintain a heart healthy weight; if you smoke - quit (A resource to help you stop smoking is the [facility] – phone number [phone].); include healthy ways to manage stress. Continue to follow with your primary care physician or cardiologist. Your LDL cholesterol will be less than 70mg/dL Continue with your cholesterol medications. Eat a heart healthy diet that is low in saturated fats and salt, and includes whole grains, fruits, vegetables and lean protein; exercise regularly (consult with your physician or cardiologist first); maintain a heart healthy weight; if you smoke - quit (A resource to help you stop smoking is the [facility] – phone number [phone].). Continue to follow with your primary physician or cardiologist.

## 2018-02-10 VITALS
TEMPERATURE: 98 F | DIASTOLIC BLOOD PRESSURE: 65 MMHG | OXYGEN SATURATION: 98 % | SYSTOLIC BLOOD PRESSURE: 115 MMHG | HEART RATE: 71 BPM | RESPIRATION RATE: 17 BRPM

## 2018-02-10 DIAGNOSIS — I25.118 ATHEROSCLEROTIC HEART DISEASE OF NATIVE CORONARY ARTERY WITH OTHER FORMS OF ANGINA PECTORIS: ICD-10-CM

## 2018-02-10 DIAGNOSIS — E78.5 HYPERLIPIDEMIA, UNSPECIFIED: ICD-10-CM

## 2018-02-10 DIAGNOSIS — I10 ESSENTIAL (PRIMARY) HYPERTENSION: ICD-10-CM

## 2018-02-10 LAB
ANION GAP SERPL CALC-SCNC: 15 MMOL/L — SIGNIFICANT CHANGE UP (ref 5–17)
BASOPHILS # BLD AUTO: 0 K/UL — SIGNIFICANT CHANGE UP (ref 0–0.2)
BASOPHILS NFR BLD AUTO: 0.5 % — SIGNIFICANT CHANGE UP (ref 0–2)
BUN SERPL-MCNC: 24 MG/DL — HIGH (ref 7–23)
CALCIUM SERPL-MCNC: 9.1 MG/DL — SIGNIFICANT CHANGE UP (ref 8.4–10.5)
CHLORIDE SERPL-SCNC: 100 MMOL/L — SIGNIFICANT CHANGE UP (ref 96–108)
CO2 SERPL-SCNC: 24 MMOL/L — SIGNIFICANT CHANGE UP (ref 22–31)
CREAT SERPL-MCNC: 1.38 MG/DL — HIGH (ref 0.5–1.3)
EOSINOPHIL # BLD AUTO: 0.2 K/UL — SIGNIFICANT CHANGE UP (ref 0–0.5)
EOSINOPHIL NFR BLD AUTO: 2.5 % — SIGNIFICANT CHANGE UP (ref 0–6)
GLUCOSE SERPL-MCNC: 122 MG/DL — HIGH (ref 70–99)
HCT VFR BLD CALC: 39.8 % — SIGNIFICANT CHANGE UP (ref 39–50)
HGB BLD-MCNC: 14.5 G/DL — SIGNIFICANT CHANGE UP (ref 13–17)
LYMPHOCYTES # BLD AUTO: 1.6 K/UL — SIGNIFICANT CHANGE UP (ref 1–3.3)
LYMPHOCYTES # BLD AUTO: 21.2 % — SIGNIFICANT CHANGE UP (ref 13–44)
MCHC RBC-ENTMCNC: 33.8 PG — SIGNIFICANT CHANGE UP (ref 27–34)
MCHC RBC-ENTMCNC: 36.4 GM/DL — HIGH (ref 32–36)
MCV RBC AUTO: 92.9 FL — SIGNIFICANT CHANGE UP (ref 80–100)
MONOCYTES # BLD AUTO: 0.7 K/UL — SIGNIFICANT CHANGE UP (ref 0–0.9)
MONOCYTES NFR BLD AUTO: 8.8 % — SIGNIFICANT CHANGE UP (ref 2–14)
NEUTROPHILS # BLD AUTO: 5.2 K/UL — SIGNIFICANT CHANGE UP (ref 1.8–7.4)
NEUTROPHILS NFR BLD AUTO: 67 % — SIGNIFICANT CHANGE UP (ref 43–77)
PLATELET # BLD AUTO: 207 K/UL — SIGNIFICANT CHANGE UP (ref 150–400)
POTASSIUM SERPL-MCNC: 4.3 MMOL/L — SIGNIFICANT CHANGE UP (ref 3.5–5.3)
POTASSIUM SERPL-SCNC: 4.3 MMOL/L — SIGNIFICANT CHANGE UP (ref 3.5–5.3)
RBC # BLD: 4.28 M/UL — SIGNIFICANT CHANGE UP (ref 4.2–5.8)
RBC # FLD: 11 % — SIGNIFICANT CHANGE UP (ref 10.3–14.5)
SODIUM SERPL-SCNC: 139 MMOL/L — SIGNIFICANT CHANGE UP (ref 135–145)
WBC # BLD: 7.7 K/UL — SIGNIFICANT CHANGE UP (ref 3.8–10.5)
WBC # FLD AUTO: 7.7 K/UL — SIGNIFICANT CHANGE UP (ref 3.8–10.5)

## 2018-02-10 PROCEDURE — C9600: CPT | Mod: LD

## 2018-02-10 PROCEDURE — 85027 COMPLETE CBC AUTOMATED: CPT

## 2018-02-10 PROCEDURE — C1725: CPT

## 2018-02-10 PROCEDURE — C1874: CPT

## 2018-02-10 PROCEDURE — C1887: CPT

## 2018-02-10 PROCEDURE — C1894: CPT

## 2018-02-10 PROCEDURE — 92921: CPT | Mod: LD

## 2018-02-10 PROCEDURE — C1769: CPT

## 2018-02-10 PROCEDURE — 93005 ELECTROCARDIOGRAM TRACING: CPT

## 2018-02-10 PROCEDURE — 80048 BASIC METABOLIC PNL TOTAL CA: CPT

## 2018-02-10 PROCEDURE — 93458 L HRT ARTERY/VENTRICLE ANGIO: CPT | Mod: 59

## 2018-02-10 RX ORDER — CLOPIDOGREL BISULFATE 75 MG/1
1 TABLET, FILM COATED ORAL
Qty: 90 | Refills: 3 | OUTPATIENT
Start: 2018-02-10 | End: 2019-02-04

## 2018-02-10 RX ADMIN — CLOPIDOGREL BISULFATE 75 MILLIGRAM(S): 75 TABLET, FILM COATED ORAL at 05:54

## 2018-02-10 RX ADMIN — Medication 81 MILLIGRAM(S): at 05:54

## 2018-02-10 NOTE — PROGRESS NOTE ADULT - SUBJECTIVE AND OBJECTIVE BOX
Patient is a 62y old  Male who presents with a chief complaint of chest pain (2018 13:53)          Allergies    No Known Allergies    Intolerances        Medications:  aspirin enteric coated 81 milliGRAM(s) Oral daily  clopidogrel Tablet 75 milliGRAM(s) Oral daily      Vitals:  T(C): 36.8 (02-10-18 @ 04:35), Max: 37.1 (18 @ 11:53)  HR: 71 (02-10-18 @ 04:35) (64 - 89)  BP: 115/65 (02-10-18 @ 04:35) (93/58 - 150/97)  BP(mean): 98 (18 @ 13:56) (98 - 98)  RR: 17 (02-10-18 @ 04:35) (14 - 18)  SpO2: 98% (02-10-18 @ 04:35) (94% - 100%)  Wt(kg): --  Daily Height in cm: 177.8 (2018 13:56)    Daily Weight in k.8 (2018 13:56)  I&O's Summary    2018 07:01  -  10 Feb 2018 05:27  --------------------------------------------------------  IN: 480 mL / OUT: 0 mL / NET: 480 mL          Physical Exam:  Appearance: Normal  Eyes: PERRL, EOMI  HENT: Normal oral [place], NC/AT  Cardiovascular: S1S2, RRR, No M/R/G, no JVD, No Lower extremity edema  Procedural Access Site: No hematoma, Non-tender to palpation, 2+ pulse, No bruit, No Ecchymosis  Respiratory: Clear to auscultation bilaterally  Gastrointestinal: Soft, Non tender, Normal Bowel Sounds  Musculoskeletal: No clubbing, No joint deformity   Neurologic: Non-focal  Lymphatic: No lymphadenopathy  Psychiatry: AAOx3, Mood & affect appropriate  Skin: No rashes, No ecchymoses, No cyanosis    02-10    139  |  100  |  24<H>  ----------------------------<  122<H>  4.3   |  24  |  1.38<H>    Ca    9.1      10 Feb 2018 03:10    TPro  7.8  /  Alb  4.2  /  TBili  0.7  /  DBili  x   /  AST  50<H>  /  ALT  121<H>  /  AlkPhos  50  02-    PT/INR - ( 2018 02:43 )   PT: 11.0 sec;   INR: 1.01 ratio           CARDIAC MARKERS ( 2018 09:37 )  <.015 ng/mL / x     / 103 U/L / x     / x      CARDIAC MARKERS ( 2018 02:43 )  <.015 ng/mL / x     / 138 U/L / x     / x            Lipid panel   Hgb A1c                         14.5   7.7   )-----------( 207      ( 10 Feb 2018 03:10 )             39.8         ECG: SR 71 bpm    Cath: one prox LAD stent, one mid LAD stent, POBA to the D1    Imaging:    Interpretation of Telemetry:

## 2018-02-10 NOTE — PROGRESS NOTE ADULT - ASSESSMENT
HPI:  62 year old male with PMH of HTN, fatty Liver, gout presents to [facility] on 2/9/18 with chest discomfort and disorientation. Patient was lying on the couch when he began feeling chest discomfort, and experiencing blurry vision, denied pain, denied association with breathing. Patient described discomfort as though his heart was racing, w/ diaphoresis and urgency to use bathroom, reports sitting down to urinate on the toilet bowl and having an unplanned/uncontrolled bowel movement. Stood up and felt disoriented, legs and arms felt weak, and he noticed he was feeling out of breath. Denied falling, head trauma, LOC, dizziness.     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 As per pt during the third stage of a cardiac stress test in which patient's heart rate was between 150-180 he was found to have two episodes of Vtach (2 beats, and 5 beats), and briefly was in atrial flutter. His echocardiogram did not reveal any abnormalities although his carotid dopplers showed mildly calcified plaque deposits b/l.     CT scan showed fatty liver, calcified aorta and normal kidneys. Patient's cholesterol and triglycerides were found to be elevated and he was started on a statin, with the first dose taken today. After the stress test a week ago patient noted feeling exhausted but did not notice any symptoms until yesterday when intermittently throughout the day he felt the same chest discomfort he reports today, without associated diaphoresis, LANGFORD, LE weakness which all started today. Currently patient reports all symptoms have resolved, denies CP, SOB, fever, chills, n/v/d    CT head No acute intracranial hemorrhage, mass effect, or evidence of acute   vascular territorial infarction.  CXR: No acute infectious process    Pt was transferred to [facility] for Cardiac Cath.     Cardiologist: Dr Lemus (09 Feb 2018 13:56)

## 2018-02-13 PROBLEM — Z00.00 ENCOUNTER FOR PREVENTIVE HEALTH EXAMINATION: Status: ACTIVE | Noted: 2018-02-13

## 2018-02-13 PROBLEM — I10 ESSENTIAL (PRIMARY) HYPERTENSION: Chronic | Status: ACTIVE | Noted: 2018-02-09

## 2018-02-20 ENCOUNTER — APPOINTMENT (OUTPATIENT)
Dept: CARDIOLOGY | Facility: CLINIC | Age: 63
End: 2018-02-20
Payer: COMMERCIAL

## 2018-02-20 VITALS
HEIGHT: 70 IN | HEART RATE: 74 BPM | DIASTOLIC BLOOD PRESSURE: 83 MMHG | OXYGEN SATURATION: 97 % | BODY MASS INDEX: 30.06 KG/M2 | SYSTOLIC BLOOD PRESSURE: 129 MMHG | WEIGHT: 210 LBS

## 2018-02-20 DIAGNOSIS — Z78.9 OTHER SPECIFIED HEALTH STATUS: ICD-10-CM

## 2018-02-20 DIAGNOSIS — Z82.49 FAMILY HISTORY OF ISCHEMIC HEART DISEASE AND OTHER DISEASES OF THE CIRCULATORY SYSTEM: ICD-10-CM

## 2018-02-20 PROCEDURE — 99204 OFFICE O/P NEW MOD 45 MIN: CPT

## 2018-02-20 PROCEDURE — 93000 ELECTROCARDIOGRAM COMPLETE: CPT

## 2018-02-28 ENCOUNTER — INPATIENT (INPATIENT)
Facility: HOSPITAL | Age: 63
LOS: 0 days | Discharge: HOME | DRG: 313 | End: 2018-03-01
Attending: HOSPITALIST | Admitting: HOSPITALIST
Payer: COMMERCIAL

## 2018-02-28 VITALS
RESPIRATION RATE: 18 BRPM | DIASTOLIC BLOOD PRESSURE: 74 MMHG | OXYGEN SATURATION: 99 % | SYSTOLIC BLOOD PRESSURE: 133 MMHG | HEART RATE: 89 BPM | TEMPERATURE: 98 F

## 2018-02-28 PROCEDURE — 93010 ELECTROCARDIOGRAM REPORT: CPT

## 2018-02-28 PROCEDURE — 99285 EMERGENCY DEPT VISIT HI MDM: CPT | Mod: 25

## 2018-02-28 PROCEDURE — 99053 MED SERV 10PM-8AM 24 HR FAC: CPT

## 2018-02-28 RX ORDER — SODIUM CHLORIDE 9 MG/ML
3 INJECTION INTRAMUSCULAR; INTRAVENOUS; SUBCUTANEOUS ONCE
Qty: 0 | Refills: 0 | Status: COMPLETED | OUTPATIENT
Start: 2018-02-28 | End: 2018-02-28

## 2018-02-28 RX ORDER — ASPIRIN/CALCIUM CARB/MAGNESIUM 324 MG
324 TABLET ORAL DAILY
Qty: 0 | Refills: 0 | Status: DISCONTINUED | OUTPATIENT
Start: 2018-02-28 | End: 2018-03-01

## 2018-02-28 NOTE — ED ADULT NURSE NOTE - OBJECTIVE STATEMENT
62 year old male came into the ER via walkin with c/o CP that began approx 2 hours ago. Pt states he had stents put in 3 weeks ago s/p blockage in LAD and pain felt the same pain as when he came in for stent placement. EKG done in triage and given to MD. Pt states his CP is intermittent and comes and goes, but when it comes on it is severe. Pt is not diaphoretic, not SOB, + strong peripheral pulses. No c/o N/V/D, abd pain or back pain at this time. Comfort and safety maintained.

## 2018-02-28 NOTE — ED PROVIDER NOTE - NS ED ROS FT
No fever, no chills, no change in vision, no change in hearing, + chest pain, no shortness of breath, no abdominal pain, no vomiting, no dysuria, no muscle pain, no rashes, no loss of consciousness. ~ Zohaib Gallegos MD

## 2018-02-28 NOTE — ED PROVIDER NOTE - MEDICAL DECISION MAKING DETAILS
Zohaib Gallegos MD (resident): chest pain at rest, concerning risk factors w/ recent PCI, on dbl antiplatelet therapy. likely will require admission for unstable angina (possible NSTEMI, eval w/ trop) Zohaib Gallegos MD (resident): chest pain at rest, concerning risk factors w/ recent PCI, on dbl antiplatelet therapy. likely will require admission for unstable angina (possible NSTEMI, eval w/ trop)  Attending Wetzel: 61 y/o male h/o CAD> sp stenting to LAD presenting with intermittent left sided chest pain. pain improves with movement. no diaphoresis. initial ekg shows no evidence of st elevation. pt on monitor. is scheduled to have additional stent by dr jackson. will given aspirin, check cardiac function and d/w cards. no back pain to suggest dissection. no sob or diff breathing to suggest PE

## 2018-02-28 NOTE — ED PROVIDER NOTE - PHYSICAL EXAMINATION
Physical Exam: chandana STEIN who is in  NAD, AAOx3, NCAT, MMM, neck is supple, PERRL, CTAB, normal rate and regular rhythm, no reproducible chest wall tenderness, normal /equal pulses in all 4 extremities, abdomen is soft and NTND, No edema, No deformity of extremities, No rashes, CN grossly intact, No focal motor or sensory deficits. ~ Zohaib Gallegos MD Physical Exam: chandana STEIN who is in  NAD, AAOx3, NCAT, MMM, neck is supple, PERRL, CTAB, normal rate and regular rhythm, no reproducible chest wall tenderness, normal /equal pulses in all 4 extremities, abdomen is soft and NTND, No edema, No deformity of extremities, No rashes, CN grossly intact, No focal motor or sensory deficits. ~ Zohaib Gallegos MD  Attending Wetzel: Gen: NAD, heent: atrauamtic, eomi, perrla, mmm, op pink, uvula midline, neck; nttp, no nuchal rigidity, chest: nttp, no crepitus, cv: rrr, no murmurs, lungs: ctab, abd: soft, nontender, nondistended, no peritoneal signs, +BS, no guarding, ext: wwp, neg homans, skin: no rash, neuro: awake and alert, following commands, speech clear, sensation and strength intact, no focal deficits

## 2018-02-28 NOTE — ED PROVIDER NOTE - PROGRESS NOTE DETAILS
Attending Rashard: intiial ekg unchanged, pt on monitor, given aspirin, will continue to monitor Zohaib Gallegos MD (resident): spoke w/ Dr. Andrews who will consult on the pt, will message Meraj for possible cath. ASA, no A/C at this time. endorsed to Dr. Macias

## 2018-02-28 NOTE — ED ADULT NURSE NOTE - NSSISCREENINGQ4_ED_A_ED
How Severe Is Your Skin Lesion?: moderate Has Your Skin Lesion Been Treated?: not been treated Is This A New Presentation, Or A Follow-Up?: Skin Lesion No

## 2018-02-28 NOTE — ED PROVIDER NOTE - OBJECTIVE STATEMENT
Zohaib Gallegos MD (resident): 62 M w/ Hx of HTN and CAD who p/w chest discomfort since this morning that started at rest, laying on the couch. Mild in severity, associated w/ flushing, and no worsening w/ exertion or deep breathing. No radiation of pain to back. Pt w/ recent PCI x2 to LAD, scheduled for delayed PCI to RCA by Dr. Frey. Took ASA 81 and plavix 75 at 8:30 am.

## 2018-03-01 ENCOUNTER — TRANSCRIPTION ENCOUNTER (OUTPATIENT)
Age: 63
End: 2018-03-01

## 2018-03-01 VITALS
HEART RATE: 63 BPM | DIASTOLIC BLOOD PRESSURE: 73 MMHG | OXYGEN SATURATION: 95 % | RESPIRATION RATE: 18 BRPM | SYSTOLIC BLOOD PRESSURE: 119 MMHG | TEMPERATURE: 98 F

## 2018-03-01 DIAGNOSIS — E78.5 HYPERLIPIDEMIA, UNSPECIFIED: ICD-10-CM

## 2018-03-01 DIAGNOSIS — I10 ESSENTIAL (PRIMARY) HYPERTENSION: ICD-10-CM

## 2018-03-01 DIAGNOSIS — I25.10 ATHEROSCLEROTIC HEART DISEASE OF NATIVE CORONARY ARTERY WITHOUT ANGINA PECTORIS: ICD-10-CM

## 2018-03-01 DIAGNOSIS — N17.9 ACUTE KIDNEY FAILURE, UNSPECIFIED: ICD-10-CM

## 2018-03-01 DIAGNOSIS — I20.0 UNSTABLE ANGINA: ICD-10-CM

## 2018-03-01 DIAGNOSIS — I25.10 ATHEROSCLEROTIC HEART DISEASE OF NATIVE CORONARY ARTERY WITHOUT ANGINA PECTORIS: Chronic | ICD-10-CM

## 2018-03-01 DIAGNOSIS — R07.89 OTHER CHEST PAIN: ICD-10-CM

## 2018-03-01 DIAGNOSIS — Z29.9 ENCOUNTER FOR PROPHYLACTIC MEASURES, UNSPECIFIED: ICD-10-CM

## 2018-03-01 DIAGNOSIS — R07.9 CHEST PAIN, UNSPECIFIED: ICD-10-CM

## 2018-03-01 LAB
ALBUMIN SERPL ELPH-MCNC: 4 G/DL — SIGNIFICANT CHANGE UP (ref 3.3–5)
ALBUMIN SERPL ELPH-MCNC: 4.4 G/DL — SIGNIFICANT CHANGE UP (ref 3.3–5)
ALP SERPL-CCNC: 45 U/L — SIGNIFICANT CHANGE UP (ref 40–120)
ALP SERPL-CCNC: 54 U/L — SIGNIFICANT CHANGE UP (ref 40–120)
ALT FLD-CCNC: 61 U/L RC — HIGH (ref 10–45)
ALT FLD-CCNC: 68 U/L RC — HIGH (ref 10–45)
ANION GAP SERPL CALC-SCNC: 12 MMOL/L — SIGNIFICANT CHANGE UP (ref 5–17)
ANION GAP SERPL CALC-SCNC: 13 MMOL/L — SIGNIFICANT CHANGE UP (ref 5–17)
AST SERPL-CCNC: 29 U/L — SIGNIFICANT CHANGE UP (ref 10–40)
AST SERPL-CCNC: 32 U/L — SIGNIFICANT CHANGE UP (ref 10–40)
BASOPHILS # BLD AUTO: 0.03 K/UL — SIGNIFICANT CHANGE UP (ref 0–0.2)
BASOPHILS # BLD AUTO: 0.1 K/UL — SIGNIFICANT CHANGE UP (ref 0–0.2)
BASOPHILS NFR BLD AUTO: 0.4 % — SIGNIFICANT CHANGE UP (ref 0–2)
BASOPHILS NFR BLD AUTO: 1 % — SIGNIFICANT CHANGE UP (ref 0–2)
BILIRUB SERPL-MCNC: 0.6 MG/DL — SIGNIFICANT CHANGE UP (ref 0.2–1.2)
BILIRUB SERPL-MCNC: 0.6 MG/DL — SIGNIFICANT CHANGE UP (ref 0.2–1.2)
BUN SERPL-MCNC: 26 MG/DL — HIGH (ref 7–23)
BUN SERPL-MCNC: 28 MG/DL — HIGH (ref 7–23)
CALCIUM SERPL-MCNC: 9.1 MG/DL — SIGNIFICANT CHANGE UP (ref 8.4–10.5)
CALCIUM SERPL-MCNC: 9.5 MG/DL — SIGNIFICANT CHANGE UP (ref 8.4–10.5)
CHLORIDE SERPL-SCNC: 100 MMOL/L — SIGNIFICANT CHANGE UP (ref 96–108)
CHLORIDE SERPL-SCNC: 99 MMOL/L — SIGNIFICANT CHANGE UP (ref 96–108)
CK MB BLD-MCNC: 1.5 % — SIGNIFICANT CHANGE UP (ref 0–3.5)
CK MB CFR SERPL CALC: 1.4 NG/ML — SIGNIFICANT CHANGE UP (ref 0–6.7)
CK MB CFR SERPL CALC: 1.7 NG/ML — SIGNIFICANT CHANGE UP (ref 0–6.7)
CK SERPL-CCNC: 116 U/L — SIGNIFICANT CHANGE UP (ref 30–200)
CO2 SERPL-SCNC: 24 MMOL/L — SIGNIFICANT CHANGE UP (ref 22–31)
CO2 SERPL-SCNC: 25 MMOL/L — SIGNIFICANT CHANGE UP (ref 22–31)
CREAT SERPL-MCNC: 1.26 MG/DL — SIGNIFICANT CHANGE UP (ref 0.5–1.3)
CREAT SERPL-MCNC: 1.41 MG/DL — HIGH (ref 0.5–1.3)
EOSINOPHIL # BLD AUTO: 0.2 K/UL — SIGNIFICANT CHANGE UP (ref 0–0.5)
EOSINOPHIL # BLD AUTO: 0.26 K/UL — SIGNIFICANT CHANGE UP (ref 0–0.5)
EOSINOPHIL NFR BLD AUTO: 2.9 % — SIGNIFICANT CHANGE UP (ref 0–6)
EOSINOPHIL NFR BLD AUTO: 3.9 % — SIGNIFICANT CHANGE UP (ref 0–6)
GLUCOSE SERPL-MCNC: 101 MG/DL — HIGH (ref 70–99)
GLUCOSE SERPL-MCNC: 83 MG/DL — SIGNIFICANT CHANGE UP (ref 70–99)
HBA1C BLD-MCNC: 6.3 % — HIGH (ref 4–5.6)
HCT VFR BLD CALC: 37.9 % — LOW (ref 39–50)
HCT VFR BLD CALC: 38.6 % — LOW (ref 39–50)
HGB BLD-MCNC: 12.6 G/DL — LOW (ref 13–17)
HGB BLD-MCNC: 13.8 G/DL — SIGNIFICANT CHANGE UP (ref 13–17)
IMM GRANULOCYTES NFR BLD AUTO: 0.3 % — SIGNIFICANT CHANGE UP (ref 0–1.5)
LIDOCAIN IGE QN: 86 U/L — HIGH (ref 7–60)
LYMPHOCYTES # BLD AUTO: 1.2 K/UL — SIGNIFICANT CHANGE UP (ref 1–3.3)
LYMPHOCYTES # BLD AUTO: 1.94 K/UL — SIGNIFICANT CHANGE UP (ref 1–3.3)
LYMPHOCYTES # BLD AUTO: 19 % — SIGNIFICANT CHANGE UP (ref 13–44)
LYMPHOCYTES # BLD AUTO: 28.8 % — SIGNIFICANT CHANGE UP (ref 13–44)
MAGNESIUM SERPL-MCNC: 2.1 MG/DL — SIGNIFICANT CHANGE UP (ref 1.6–2.6)
MCHC RBC-ENTMCNC: 30.4 PG — SIGNIFICANT CHANGE UP (ref 27–34)
MCHC RBC-ENTMCNC: 32.9 PG — SIGNIFICANT CHANGE UP (ref 27–34)
MCHC RBC-ENTMCNC: 33.2 GM/DL — SIGNIFICANT CHANGE UP (ref 32–36)
MCHC RBC-ENTMCNC: 35.7 GM/DL — SIGNIFICANT CHANGE UP (ref 32–36)
MCV RBC AUTO: 91.3 FL — SIGNIFICANT CHANGE UP (ref 80–100)
MCV RBC AUTO: 92.1 FL — SIGNIFICANT CHANGE UP (ref 80–100)
MONOCYTES # BLD AUTO: 0.47 K/UL — SIGNIFICANT CHANGE UP (ref 0–0.9)
MONOCYTES # BLD AUTO: 0.6 K/UL — SIGNIFICANT CHANGE UP (ref 0–0.9)
MONOCYTES NFR BLD AUTO: 7 % — SIGNIFICANT CHANGE UP (ref 2–14)
MONOCYTES NFR BLD AUTO: 9.1 % — SIGNIFICANT CHANGE UP (ref 2–14)
NEUTROPHILS # BLD AUTO: 4.02 K/UL — SIGNIFICANT CHANGE UP (ref 1.8–7.4)
NEUTROPHILS # BLD AUTO: 4.2 K/UL — SIGNIFICANT CHANGE UP (ref 1.8–7.4)
NEUTROPHILS NFR BLD AUTO: 59.6 % — SIGNIFICANT CHANGE UP (ref 43–77)
NEUTROPHILS NFR BLD AUTO: 68.1 % — SIGNIFICANT CHANGE UP (ref 43–77)
NT-PROBNP SERPL-SCNC: 32 PG/ML — SIGNIFICANT CHANGE UP (ref 0–300)
PHOSPHATE SERPL-MCNC: 4.2 MG/DL — SIGNIFICANT CHANGE UP (ref 2.5–4.5)
PLATELET # BLD AUTO: 201 K/UL — SIGNIFICANT CHANGE UP (ref 150–400)
PLATELET # BLD AUTO: 206 K/UL — SIGNIFICANT CHANGE UP (ref 150–400)
POTASSIUM SERPL-MCNC: 4 MMOL/L — SIGNIFICANT CHANGE UP (ref 3.5–5.3)
POTASSIUM SERPL-MCNC: 4.1 MMOL/L — SIGNIFICANT CHANGE UP (ref 3.5–5.3)
POTASSIUM SERPL-SCNC: 4 MMOL/L — SIGNIFICANT CHANGE UP (ref 3.5–5.3)
POTASSIUM SERPL-SCNC: 4.1 MMOL/L — SIGNIFICANT CHANGE UP (ref 3.5–5.3)
PROT SERPL-MCNC: 6.9 G/DL — SIGNIFICANT CHANGE UP (ref 6–8.3)
PROT SERPL-MCNC: 7.5 G/DL — SIGNIFICANT CHANGE UP (ref 6–8.3)
RBC # BLD: 4.15 M/UL — LOW (ref 4.2–5.8)
RBC # BLD: 4.2 M/UL — SIGNIFICANT CHANGE UP (ref 4.2–5.8)
RBC # FLD: 10.9 % — SIGNIFICANT CHANGE UP (ref 10.3–14.5)
RBC # FLD: 12.1 % — SIGNIFICANT CHANGE UP (ref 10.3–14.5)
SODIUM SERPL-SCNC: 136 MMOL/L — SIGNIFICANT CHANGE UP (ref 135–145)
SODIUM SERPL-SCNC: 137 MMOL/L — SIGNIFICANT CHANGE UP (ref 135–145)
TROPONIN T SERPL-MCNC: <0.01 NG/ML — SIGNIFICANT CHANGE UP (ref 0–0.06)
TROPONIN T SERPL-MCNC: <0.01 NG/ML — SIGNIFICANT CHANGE UP (ref 0–0.06)
TSH SERPL-MCNC: 0.9 UIU/ML — SIGNIFICANT CHANGE UP (ref 0.27–4.2)
WBC # BLD: 6.2 K/UL — SIGNIFICANT CHANGE UP (ref 3.8–10.5)
WBC # BLD: 6.74 K/UL — SIGNIFICANT CHANGE UP (ref 3.8–10.5)
WBC # FLD AUTO: 6.2 K/UL — SIGNIFICANT CHANGE UP (ref 3.8–10.5)
WBC # FLD AUTO: 6.74 K/UL — SIGNIFICANT CHANGE UP (ref 3.8–10.5)

## 2018-03-01 PROCEDURE — 80053 COMPREHEN METABOLIC PANEL: CPT

## 2018-03-01 PROCEDURE — 99285 EMERGENCY DEPT VISIT HI MDM: CPT | Mod: 25

## 2018-03-01 PROCEDURE — 93010 ELECTROCARDIOGRAM REPORT: CPT

## 2018-03-01 PROCEDURE — 83690 ASSAY OF LIPASE: CPT

## 2018-03-01 PROCEDURE — 83880 ASSAY OF NATRIURETIC PEPTIDE: CPT

## 2018-03-01 PROCEDURE — 82550 ASSAY OF CK (CPK): CPT

## 2018-03-01 PROCEDURE — 71046 X-RAY EXAM CHEST 2 VIEWS: CPT

## 2018-03-01 PROCEDURE — 99239 HOSP IP/OBS DSCHRG MGMT >30: CPT

## 2018-03-01 PROCEDURE — 84100 ASSAY OF PHOSPHORUS: CPT

## 2018-03-01 PROCEDURE — 83735 ASSAY OF MAGNESIUM: CPT

## 2018-03-01 PROCEDURE — 99223 1ST HOSP IP/OBS HIGH 75: CPT

## 2018-03-01 PROCEDURE — 85027 COMPLETE CBC AUTOMATED: CPT

## 2018-03-01 PROCEDURE — 71046 X-RAY EXAM CHEST 2 VIEWS: CPT | Mod: 26

## 2018-03-01 PROCEDURE — 82553 CREATINE MB FRACTION: CPT

## 2018-03-01 PROCEDURE — 84443 ASSAY THYROID STIM HORMONE: CPT

## 2018-03-01 PROCEDURE — 83036 HEMOGLOBIN GLYCOSYLATED A1C: CPT

## 2018-03-01 PROCEDURE — 84484 ASSAY OF TROPONIN QUANT: CPT

## 2018-03-01 PROCEDURE — 93005 ELECTROCARDIOGRAM TRACING: CPT

## 2018-03-01 RX ORDER — METOPROLOL TARTRATE 50 MG
25 TABLET ORAL DAILY
Qty: 0 | Refills: 0 | Status: DISCONTINUED | OUTPATIENT
Start: 2018-03-01 | End: 2018-03-01

## 2018-03-01 RX ORDER — ATORVASTATIN CALCIUM 80 MG/1
40 TABLET, FILM COATED ORAL AT BEDTIME
Qty: 0 | Refills: 0 | Status: DISCONTINUED | OUTPATIENT
Start: 2018-03-01 | End: 2018-03-01

## 2018-03-01 RX ORDER — METOPROLOL TARTRATE 50 MG
1 TABLET ORAL
Qty: 30 | Refills: 0
Start: 2018-03-01 | End: 2018-03-30

## 2018-03-01 RX ORDER — CLOPIDOGREL BISULFATE 75 MG/1
75 TABLET, FILM COATED ORAL DAILY
Qty: 0 | Refills: 0 | Status: DISCONTINUED | OUTPATIENT
Start: 2018-03-01 | End: 2018-03-01

## 2018-03-01 RX ORDER — HEPARIN SODIUM 5000 [USP'U]/ML
5000 INJECTION INTRAVENOUS; SUBCUTANEOUS EVERY 8 HOURS
Qty: 0 | Refills: 0 | Status: DISCONTINUED | OUTPATIENT
Start: 2018-03-01 | End: 2018-03-01

## 2018-03-01 RX ORDER — AMLODIPINE BESYLATE 2.5 MG/1
5 TABLET ORAL DAILY
Qty: 0 | Refills: 0 | Status: DISCONTINUED | OUTPATIENT
Start: 2018-03-01 | End: 2018-03-01

## 2018-03-01 RX ORDER — PANTOPRAZOLE SODIUM 20 MG/1
40 TABLET, DELAYED RELEASE ORAL
Qty: 0 | Refills: 0 | Status: DISCONTINUED | OUTPATIENT
Start: 2018-03-01 | End: 2018-03-01

## 2018-03-01 RX ORDER — PANTOPRAZOLE SODIUM 20 MG/1
1 TABLET, DELAYED RELEASE ORAL
Qty: 30 | Refills: 0
Start: 2018-03-01 | End: 2018-03-30

## 2018-03-01 RX ORDER — SODIUM CHLORIDE 9 MG/ML
1000 INJECTION INTRAMUSCULAR; INTRAVENOUS; SUBCUTANEOUS
Qty: 0 | Refills: 0 | Status: COMPLETED | OUTPATIENT
Start: 2018-03-01 | End: 2018-03-01

## 2018-03-01 RX ORDER — PANTOPRAZOLE SODIUM 20 MG/1
40 TABLET, DELAYED RELEASE ORAL ONCE
Qty: 0 | Refills: 0 | Status: COMPLETED | OUTPATIENT
Start: 2018-03-01 | End: 2018-03-01

## 2018-03-01 RX ORDER — ASPIRIN/CALCIUM CARB/MAGNESIUM 324 MG
81 TABLET ORAL DAILY
Qty: 0 | Refills: 0 | Status: DISCONTINUED | OUTPATIENT
Start: 2018-03-01 | End: 2018-03-01

## 2018-03-01 RX ADMIN — SODIUM CHLORIDE 3 MILLILITER(S): 9 INJECTION INTRAMUSCULAR; INTRAVENOUS; SUBCUTANEOUS at 00:07

## 2018-03-01 RX ADMIN — HEPARIN SODIUM 5000 UNIT(S): 5000 INJECTION INTRAVENOUS; SUBCUTANEOUS at 13:29

## 2018-03-01 RX ADMIN — AMLODIPINE BESYLATE 5 MILLIGRAM(S): 2.5 TABLET ORAL at 05:40

## 2018-03-01 RX ADMIN — CLOPIDOGREL BISULFATE 75 MILLIGRAM(S): 75 TABLET, FILM COATED ORAL at 11:40

## 2018-03-01 RX ADMIN — Medication 324 MILLIGRAM(S): at 00:11

## 2018-03-01 RX ADMIN — Medication 25 MILLIGRAM(S): at 05:40

## 2018-03-01 RX ADMIN — Medication 81 MILLIGRAM(S): at 09:59

## 2018-03-01 RX ADMIN — PANTOPRAZOLE SODIUM 40 MILLIGRAM(S): 20 TABLET, DELAYED RELEASE ORAL at 09:59

## 2018-03-01 RX ADMIN — SODIUM CHLORIDE 100 MILLILITER(S): 9 INJECTION INTRAMUSCULAR; INTRAVENOUS; SUBCUTANEOUS at 03:55

## 2018-03-01 RX ADMIN — HEPARIN SODIUM 5000 UNIT(S): 5000 INJECTION INTRAVENOUS; SUBCUTANEOUS at 05:40

## 2018-03-01 NOTE — DISCHARGE NOTE ADULT - MEDICATION SUMMARY - MEDICATIONS TO TAKE
I will START or STAY ON the medications listed below when I get home from the hospital:    aspirin 81 mg oral delayed release tablet  -- 1 tab(s) by mouth once a day  home/hospital  -- Indication: For CAD (coronary artery disease)    lisinopril 40 mg oral tablet  -- 1 tab(s) by mouth once a day  home/hospital  -- Indication: For blood pressure     atorvastatin 40 mg oral tablet  -- 1 tab(s) by mouth once a day (at bedtime)  home/hospital  -- Indication: For elevated cholesterol     clopidogrel 75 mg oral tablet  -- 1 tab(s) by mouth once a day  -- Indication: For Blood thinner/CAD     metoprolol succinate 25 mg oral tablet, extended release  -- 1 tab(s) by mouth once a day  -- Indication: For blood pressure     amLODIPine 5 mg oral tablet  -- 1 tab(s) by mouth once a day  home/hospital  -- Indication: For Blood pressure     chlorthalidone 25 mg oral tablet  -- 1 tab(s) by mouth every other day  home/hospital  -- Indication: For water pill    pantoprazole 40 mg oral delayed release tablet  -- 1 tab(s) by mouth once a day (before a meal)  -- Indication: For GI protective

## 2018-03-01 NOTE — PROGRESS NOTE ADULT - SUBJECTIVE AND OBJECTIVE BOX
Patient is a 62y old  Male who presents with a chief complaint of chest discomfort (01 Mar 2018 02:56)      SUBJECTIVE / OVERNIGHT EVENTS: Patient reports episode of belching, burping, with lot of gas this AM and felt like he had an "irritation" in his chest which improved after he got PPI IV. He denies any current chest pain at rest or on exertion while walking. Denies sob, palpitations, lightheadness, n/v, diaphoresis Wants to go home    MEDICATIONS  (STANDING):  amLODIPine   Tablet 5 milliGRAM(s) Oral daily  aspirin enteric coated 81 milliGRAM(s) Oral daily  atorvastatin 40 milliGRAM(s) Oral at bedtime  clopidogrel Tablet 75 milliGRAM(s) Oral daily  heparin  Injectable 5000 Unit(s) SubCutaneous every 8 hours  metoprolol succinate ER 25 milliGRAM(s) Oral daily  pantoprazole    Tablet 40 milliGRAM(s) Oral before breakfast    MEDICATIONS  (PRN):      Vital Signs Last 24 Hrs  T(C): 36.6 (01 Mar 2018 04:49), Max: 36.9 (28 Feb 2018 23:40)  T(F): 97.8 (01 Mar 2018 04:49), Max: 98.4 (28 Feb 2018 23:40)  HR: 69 (01 Mar 2018 09:50) (65 - 89)  BP: 132/78 (01 Mar 2018 09:50) (116/69 - 150/76)  BP(mean): --  RR: 18 (01 Mar 2018 04:49) (18 - 18)  SpO2: 96% (01 Mar 2018 04:49) (96% - 99%)  CAPILLARY BLOOD GLUCOSE        I&O's Summary    28 Feb 2018 07:01  -  01 Mar 2018 07:00  --------------------------------------------------------  IN: 300 mL / OUT: 0 mL / NET: 300 mL        PHYSICAL EXAM:  GENERAL: NAD, well-developed  HEAD:  Atraumatic, Normocephalic  NECK: Supple, No JVD  CHEST/LUNG: Clear to auscultation bilaterally; No wheeze  HEART: Regular rate and rhythm; No murmurs, rubs, or gallops  ABDOMEN: Soft, Nontender, Nondistended; Bowel sounds present  EXTREMITIES:  2+ Peripheral Pulses, No clubbing, cyanosis, or edema  PSYCH: AAOx3  NEUROLOGY: non-focal  SKIN: No rashes or lesions    LABS:                        12.6   6.74  )-----------( 201      ( 01 Mar 2018 09:42 )             37.9     03-01    137  |  100  |  26<H>  ----------------------------<  101<H>  4.0   |  24  |  1.26    Ca    9.1      01 Mar 2018 07:12  Phos  4.2     03-01  Mg     2.1     03-01    TPro  6.9  /  Alb  4.0  /  TBili  0.6  /  DBili  x   /  AST  29  /  ALT  61<H>  /  AlkPhos  45  03-01      CARDIAC MARKERS ( 01 Mar 2018 07:12 )  x     / <0.01 ng/mL / x     / x     / 1.4 ng/mL  CARDIAC MARKERS ( 01 Mar 2018 00:17 )  x     / <0.01 ng/mL / 116 U/L / x     / 1.7 ng/mL          RADIOLOGY & ADDITIONAL TESTS:    personally reviewed EKG: NSR with unchanged J point elevation in v2-v6    reviewed tele: sinus 50-80s    Imaging Personally Reviewed: CXR clear lungs    Consultant(s) Notes Reviewed:  cards    Care Discussed with Consultants/Other Providers: Dr Griffin, no plans for cath today, will get staged PCI as outpatient, c/w PPI, consider discharge today, 2d echo can be done in vs outpatient, should not delay discharged

## 2018-03-01 NOTE — H&P ADULT - NSHPLABSRESULTS_GEN_ALL_CORE
Labs personally reviewed:                          13.8   6.2   )-----------( 206      ( 01 Mar 2018 00:17 )             38.6     03-01    136  |  99  |  28<H>  ----------------------------<  83  4.1   |  25  |  1.41<H>    Ca    9.5      01 Mar 2018 00:17    TPro  7.5  /  Alb  4.4  /  TBili  0.6  /  DBili  x   /  AST  32  /  ALT  68<H>  /  AlkPhos  54  03-01    CARDIAC MARKERS ( 01 Mar 2018 00:17 )  x     / <0.01 ng/mL / 116 U/L / x     / 1.7 ng/mL      LIVER FUNCTIONS - ( 01 Mar 2018 00:17 )  Alb: 4.4 g/dL / Pro: 7.5 g/dL / ALK PHOS: 54 U/L / ALT: 68 U/L RC / AST: 32 U/L / GGT: x               CAPILLARY BLOOD GLUCOSE          Imaging:  CXR personally reviewed: no focal opacity  EKG shows ?slight pronounced ST in precordial leads     Cardiac cath Feb 09, 2018: EF 55%; prox LAD 90%, Mid LAD 90%, D2 90%, prox circumflex 60%, mid RCA 60%  stents placed to prox LAD and mid LAD

## 2018-03-01 NOTE — DISCHARGE NOTE ADULT - CARE PLAN
Principal Discharge DX:	Chest pain at rest  Goal:	resolved  Assessment and plan of treatment:	Follow up with your cardiologist within 1 week   continue Cardiac Medication   HOME CARE INSTRUCTIONS  For the next few days, avoid physical activities that bring on chest pain. Continue physical activities as directed.  Do not smoke.  Avoid drinking alcohol.   Only take over-the-counter or prescription medicine for pain, discomfort, or fever as directed by your caregiver.  Follow your caregiver's suggestions for further testing if your chest pain does not go away.  Keep any follow-up appointments you made. If you do not go to an appointment, you could develop lasting (chronic) problems with pain. If there is any problem keeping an appointment, you must call to reschedule.   SEEK MEDICAL CARE IF:  You think you are having problems from the medicine you are taking. Read your medicine instructions carefully.  Your chest pain does not go away, even after treatment.  You develop a rash with blisters on your chest.  SEEK IMMEDIATE MEDICAL CARE IF:  You have increased chest pain or pain that spreads to your arm, neck, jaw, back, or abdomen.   You develop shortness of breath, an increasing cough, or you are coughing up blood.  You have severe back or abdominal pain, feel nauseous, or vomit.  You develop severe weakness, fainting, or chills.  You have a fever.  THIS IS AN EMERGENCY. Do not wait to see if the pain will go away. Get medical help at once. Call your local emergency services  911 . Do not drive yourself to the hospital.  Secondary Diagnosis:	CAD (coronary artery disease)  Assessment and plan of treatment:	Coronary artery disease is a condition where the arteries the supply the heart muscle get clogges with fatty deposits & puts you at risk for a heart attack  Call your doctor if you have any new pain, pressure, or discomfort in the center of your chest, pain, tingling or discomfort in arms, back, neck, jaw, or stomach, shortness of breath, nausea, vomiting, burping or heartburn, sweating, cold and clammy skin, racing or abnormal heartbeat for more than 10 minutes or if they keep coming & going.  Call 911 and do not tr to get to hospital by care  You can help yourself with lefestyle changes (quitting smoking if you smoke), eat lots of fruits & vegetables & low fat dairy products, not a lot of meat & fatty foods, walk or some form of physical activity most days of the week, lose weight if you are overweight  Take your cardiac medication as prescribed to lower cholesterol, to lower blood pressure, aspirin to prevent blood clots, and diabetes control  Make sure to keep appointments with doctor for cardiac follow up care  Secondary Diagnosis:	Essential hypertension  Assessment and plan of treatment:	Low salt diet  Activity as tolerated.  Take all medication as prescribed.  Follow up with your medical doctor for routine blood pressure monitoring at your next visit.  Notify your doctor if you have any of the following symptoms:   Dizziness, Lightheadedness, Blurry vision, Headache, Chest pain, Shortness of breath

## 2018-03-01 NOTE — PROGRESS NOTE ADULT - PROBLEM SELECTOR PLAN 1
Symptoms are more consistent with dyspepsia and GERD resolved s/p IV PPI, unlikely cardiac given negative troponins and no acute ekg changes, no tele events, and no active chest pain  -discussed with cards, Dr Griffin who discussed with interventionalist Dr Frey, no plans for cath today, will do staged PCI as outpatient on March 16th. Also recommended PPI 40mg daily and echo which can be done as outpatient, should not delay discharge  -c/w Pantoprazole 40mg daily  -f/u 2d echo if done today

## 2018-03-01 NOTE — DISCHARGE NOTE ADULT - HOSPITAL COURSE
61 yo male with PMH of HTN, CAD, Stents x 2 to LAD, presents with Chest discomfort.  Patient states that he had stents to LAD 3 weeks ago, and was planned for delayed PCI to RCA on March 16th.  Patient states that on Monday, patient was not feeling well.  He had intermittent chest discomfort and abdominal discomfort all throughout day.  States with walking symptoms go away.  Symptoms were associated with lightheadedness.  He called his cardiologist on Monday, who prescribed BB, but patient had nor received it yet.  Patient states that he felt well on Tuesday.  Last night he was lying on the couch and started having similar symptoms again.  He also felt like his heart was racing.  He denies any associated sweating or SOB.  He also denies cough, fever, nausea, vomiting.  In ED, patient received 324mg of aspirin which apparently relieved his symptoms.  Currently he denies any chest discomfort.  Patient also states that he has been having right flank pain for last one week.  He denies any dysuria or hematuria. 63 yo male with PMH of HTN, CAD, Stents x 2 to LAD, presents with Chest discomfort.  Patient states that he had stents to LAD 3 weeks ago, and was planned for delayed PCI to RCA on March 16th.  Patient states that on Monday, patient was not feeling well.  He had intermittent chest discomfort and abdominal discomfort all throughout day.  States with walking symptoms go away.  Symptoms were associated with lightheadedness.  He called his cardiologist on Monday, who prescribed BB, but patient had nor received it yet.  Patient states that he felt well on Tuesday.  Last night he was lying on the couch and started having similar symptoms again.  He also felt like his heart was racing.  He denies any associated sweating or SOB.  He also denies cough, fever, nausea, vomiting.  In ED, patient received 324mg of aspirin which apparently relieved his symptoms.  Currently he denies any chest discomfort.  Patient also states that he has been having right flank pain for last one week.  He denies any dysuria or hematuria.     Problem/Plan - 1:  ·  Problem: Chest discomfort.  Plan: Symptoms are more consistent with dyspepsia and GERD resolved s/p IV PPI, u   -  negative troponin and no   - no  acute EKG changes,     - no tele events,     -  no active chest pain    -  2d echo as outpatient     -  Follow up with Dr. Frey .      Problem/Plan - 2:   CAD (coronary artery disease).    - s/p recent stents on 2/9/18  -c/w DAPT with aspirin, Plavix atorvastatin 40mg daily  -c/w metoprolol succ 25mg daily.      Problem/Plan - 3:  ·  Problem: CHESTER (acute kidney injury).  Plan: CHESTER resolved,    - encourage increase drinking    -  creatinine back to baseline   - restart chlorthalidone and lisinopril      Problem/Plan - 4:  ·  Problem: HLD (hyperlipidemia).  Plan: c/w atorvastatin 40mg daily.      Problem/Plan - 5:  ·  Problem: Hypertension.  Plan: stable  c/w norvasc 5mg daily  c/w metoprolol 25mg ER daily      Problem /plan - 6: GERD   -  start Protonix 40 mg po daily 63 yo male with PMH of HTN, CAD, Stents x 2 to LAD, presents with Chest discomfort.  Patient states that he had stents to LAD 3 weeks ago, and was planned for delayed PCI to RCA on March 16th.  Patient states that on Monday, patient was not feeling well.  He had intermittent chest discomfort and abdominal discomfort all throughout day.  States with walking symptoms go away.  Symptoms were associated with lightheadedness.  He called his cardiologist on Monday, who prescribed BB, but patient had nor received it yet.  Patient states that he felt well on Tuesday.  Last night he was lying on the couch and started having similar symptoms again.  He also felt like his heart was racing.  He denies any associated sweating or SOB.  He also denies cough, fever, nausea, vomiting.  In ED, patient received 324mg of aspirin which apparently relieved his symptoms.  Currently he denies any chest discomfort.  Patient also states that he has been having right flank pain for last one week.  He denies any dysuria or hematuria.    Hospital Course: Patient admitted to tele, troponins were negative and he did not have any acute ischemic EKG changes, no events on tele. CXR wnl. His symptoms resolved after starting PPI and he had no further episodes of chest pain or sob while in hospital. Cardiology was consulted and rec to followup as outpatient later this month to get staged PCI of known lesions, they did not rec inpatient cath as per Dr Girffin. His CHESTER resolved with IVH and creatinine went back to baseline. He was kept on all his home meds and will be discharged on PPI As well. Per cards, 2d echo ordered but should not delay discharge and can be done as outpatient as patient has no s/s of overload or other acute cardiac indications for echo. Echo was not done and cards rec discharge. Patient HD stable without any complaints stable for discharge home with cards followup      Discharge Diagnosis:  Chest discomfort  CAD s/p stents  GERD  CHESTER  HTN  HLD

## 2018-03-01 NOTE — DISCHARGE NOTE ADULT - CARE PROVIDER_API CALL
Jett Frey (MD), Cardiology; Internal Medicine; Interventional Cardiology  [facility] Dept of Cardiology  [address] [place] [address]  Phone: [phone]  Fax: [phone]    Rufus Lemus (MD), Cardiovascular Disease  [address] [facility] [facility]  [address] [place] [address]  Phone: [phone]  Fax: [phone]

## 2018-03-01 NOTE — H&P ADULT - PMH
CAD (coronary artery disease)    Fatty liver    Gout attack    HLD (hyperlipidemia)    Hypertension    Renal stones

## 2018-03-01 NOTE — H&P ADULT - NSHPPHYSICALEXAM_GEN_ALL_CORE
PHYSICAL EXAM:  Vital Signs Last 24 Hrs  T(C): 36.8 (03-01-18 @ 00:00)  T(F): 98.2 (03-01-18 @ 00:00), Max: 98.4 (02-28-18 @ 23:40)  HR: 76 (03-01-18 @ 00:00) (76 - 89)  BP: 125/79 (03-01-18 @ 00:00)  BP(mean): --  RR: 18 (03-01-18 @ 00:00) (18 - 18)  SpO2: 98% (03-01-18 @ 00:00) (98% - 99%)  Wt(kg): --    Constitutional: NAD, awake and alert  EYES: EOMI  ENT:  Normal Hearing, no tonsillar exudates   Neck: Soft and supple, No JVD  Lungs: Breath sounds are clear bilaterally, No wheezing, rales or rhonchi  Heart: S1 and S2, regular rate and rhythm, no Murmurs, gallops or rubs  Abdomen: Bowel Sounds present, soft, nontender, nondistended, no guarding, no rebound  Extremities: No cyanosis or clubbing; warm to touch  Vascular: 2+ peripheral pulses lower ex  Neurological: A/O x 3, no focal deficits  Musculoskeletal: 5/5 strength b/l upper and lower extremities  Skin: No rashes  Psych: no depression or anhedonia  HEME: no bruises, no nose bleeds

## 2018-03-01 NOTE — CONSULT NOTE ADULT - ASSESSMENT
61 yo male with PMH of HTN, CAD, PCI DELORIS x 2 to LAD, presents with Chest discomfort for past two days.

## 2018-03-01 NOTE — DISCHARGE NOTE ADULT - PATIENT PORTAL LINK FT
You can access the [facility] Patient Portal, offered by [facility], by registering with the following website: [email or web address]

## 2018-03-01 NOTE — CONSULT NOTE ADULT - SUBJECTIVE AND OBJECTIVE BOX
CARDIOLOGY CONSULTATION NOTE                                                                                             Consult requested by: emergency department      Reason for Consultation: chest discomfort    History obtained by: Patient and medical record     obtained: No    Chief complaint:    Patient is a 62y old  Male who presents with a chief complaint of chest discomfort (01 Mar 2018 02:56)      HPI:  61 yo male with PMH of HTN, CAD, Stents x 2 to LAD, presents with Chest discomfort.  Patient states that he had PCI to LAD 3 weeks ago, and was planned for staged PCI to RCA on March 16th.  Patient states that on Monday, he was not feeling well.  He had intermittent chest discomfort and abdominal discomfort all throughout day. States that walking made symptoms go away.  Chest discomfort was associated with lightheadedness a feeling of be flushed all over.  He called his cardiologist on Monday, who prescribed BB, but patient did start his beta blockers yet.  Patient states that he felt well on Tuesday.  Last night he was lying on the couch and started having similar symptoms again.  He also felt like his heart was racing.  He denies any associated diaphoresis or SOB.  He also denies cough, fever, nausea, vomiting.  In ED, patient received 324mg of aspirin which apparently relieved his symptoms.  Currently he denies any chest discomfort.  He is known to cardiologist, Dr. Steve Frey.       REVIEW OF SYMPTOMS: Cardiovascular:  See HPI. + chest pain,  No dyspnea,  No syncope,  No palpitations, + dizziness, No Orthopnea, No Paroxsymal nocturnal dyspnea  Respiratory:  No Dyspnea, No cough,     Genitourinary:  No dysuria, no hematuria  Gastrointestinal:  No nausea, no vomiting. No diarrhea.  No abdominal pain. No dark color stool, no melena   Neurological: No headache, no dizziness, no slurred speech    Psychiatric: No agitation, no anxiety.    ALL OTHER REVIEW OF SYSTEMS ARE NEGATIVE.    ALLERGIES:   No Known Allergies      CURRENT MEDICATIONS:  amLODIPine   Tablet 5 milliGRAM(s) Oral daily  metoprolol succinate ER 25 milliGRAM(s) Oral daily     aspirin  chewable  aspirin enteric coated  atorvastatin  clopidogrel Tablet  heparin  Injectable      HOME MEDICATIONS:  ASA 81mg po daily  plavix 75mg po daily  lisinopril 40mg po daily  lipitor 40mg po daily  chlorthalidone 25mg po daily  Norvasc 5mg po daily    PAST MEDICAL HISTORY  CAD (coronary artery disease)  Renal stones  HLD (hyperlipidemia)  Gout attack  Fatty liver  Hypertension      PAST SURGICAL HISTORY  CAD S/P percutaneous coronary angioplasty  No significant past surgical history      FAMILY HISTORY:  Family history of cerebrovascular accident (CVA)  Family history of cardiac pacemaker (Sibling)  Family history of acute myocardial infarction      SOCIAL HISTORY:  Denies smoking/alcohol/drugs      Vital Signs Last 24 Hrs  T(C): 36.6 (01 Mar 2018 04:49), Max: 36.9 (28 Feb 2018 23:40)  T(F): 97.8 (01 Mar 2018 04:49), Max: 98.4 (28 Feb 2018 23:40)  HR: 65 (01 Mar 2018 04:49) (65 - 89)  BP: 116/69 (01 Mar 2018 04:49) (116/69 - 150/76)  BP(mean): --  RR: 18 (01 Mar 2018 04:49) (18 - 18)  SpO2: 96% (01 Mar 2018 04:49) (96% - 99%)      PHYSICAL EXAM:  Constitutional: Comfortable . No acute distress.   HEENT: Atraumatic and normcephalic , neck is supple . no JVD. No carotid bruit. PEERL   CNS: A&Ox3. No focal deficits. EOMI.   Lymph Nodes: Cervical : Not palpable.  Respiratory: CTAB  Cardiovascular: S1S2 RRR. No murmur/rubs or gallop.  Gastrointestinal: Soft non-tender and non distended . +Bowel sounds.   Extremities: No edema.   Psychiatric: Calm . no agitation.  Skin: No skin rash/ulcers visualized to face, hands or feet.    Intake and output:     LABS:                        13.8   6.2   )-----------( 206      ( 01 Mar 2018 00:17 )             38.6     03-01    136  |  99  |  28<H>  ----------------------------<  83  4.1   |  25  |  1.41<H>    Ca    9.5      01 Mar 2018 00:17    TPro  7.5  /  Alb  4.4  /  TBili  0.6  /  DBili  x   /  AST  32  /  ALT  68<H>  /  AlkPhos  54  03-01    CARDIAC MARKERS ( 01 Mar 2018 00:17 )  x     / <0.01 ng/mL / 116 U/L / x     / 1.7 ng/mL    ;p-BNP=Serum Pro-Brain Natriuretic Peptide: 32 pg/mL (03-01 @ 00:17)          INTERPRETATION OF TELEMETRY: Reviewed by me.   ECG: Reviewed by me. sinus rhythm, no ST-T wave changes suggestive of ischemia    RADIOLOGY & ADDITIONAL STUDIES:    X-ray:  reviewed by me. no vascular congestion    CATH:  < from: Cardiac Cath Lab - Adult (02.09.18 @ 16:01) >  The coronary circulation is right dominant.  LM:   --  LM: Normal.  LAD:   --  Proximal LAD: There was a diffuse 90 % stenosis. There was KAVITA  grade 3 flow through the vessel (brisk flow). This is a likelyculprit for  the patient's clinical presentation.  --  Mid LAD: There was a diffuse 90 % stenosis. There was KAVITA grade 3 flow  through the vessel (brisk flow). This is a likely culprit for the  patient's clinical presentation.  --  D2: There was a diffuse 90 % stenosis. There was KAVITA grade 3 flow  through the vessel (brisk flow). This is a likely culprit for the  patient's clinical presentation.  CX:   --  Proximal circumflex: There was a 60 % stenosis.  RCA:   --  Mid RCA: There was a tubular 60 % stenosis. There was KAVITA grade  3 flow through the vessel (brisk flow). This is a likely culprit for the  patient's clinical presentation. An intervention was performed.  COMPLICATIONS: There were no complications.  DIAGNOSTIC RECOMMENDATIONS:  1. Successful PCI to the pLAD (3.5mm Resolute [place]ens DELORIS) and mLAD (3.5mm  Resolute [place] DELORIS) and POBA to the 2.0mm to the D1 in the setting of  angina.  2. DAPT.  INTERVENTIONAL RECOMMENDATIONS:  1. Successful PCI to the pLAD (3.5mm Resolute [place] DELORIS) and mLAD (3.5mm  Resolute [place] DELORIS) and POBA to the 2.0mm to the D1 in the setting of  angina.  2. DAPT.  Prepared and signed by  Jett Frey M.D.    < end of copied text >

## 2018-03-01 NOTE — CHART NOTE - NSCHARTNOTEFT_GEN_A_CORE
at 0930 call by RN to see patient with c/o chest discomfort.  Patient seen and evaluated at bedside   State complaints of sharp pain to mid-sternal radiation across his chest to his left arm states 3/10   Denies palpitation, SOB, N/V.  /78 HR 69 RR 18     neuro:  A&O x 3  CV:  S1S2 2+ pp no edema to LE  Pulmonary:  Lung Clear   GI: + BS x 4     Assessment/Plan : 61 yo male with PMH of HTN, CAD, Stents x 2 to LAD, presents with Chest discomfort.  Patient states that he had stents to LAD 3 weeks ago, and was planned for delayed PCI to RCA on March 16th.  Patient states that on Monday, patient was not feeling well.  He had intermittent chest discomfort and abdominal discomfort all throughout day.  He says walking made symptoms go away.  Symptoms were associated with lightheadedness.  He called his cardiologist on Monday, who prescribed BB, but patient had nor received it yet.  Patient states that he felt well on Tuesday.  Last night he was lying on the couch and started having similar symptoms again.  He also felt like his heart was racing.  He denies any associated sweating or SOB.  He also denies cough, fever, nausea, vomiting.     Plan:  CP    >EKG - no acute changes     > Baby ASA  81 mg to chew x 1    > Protonix 40 mg IVP x 1  Discuss above with Dr. Griffin, agree with plan, will continue with Protonix 40 mg po daily   Cardiac enzymes negative x 3   Will continue to monitor   Dayna Bailey DNP- C  [id number]

## 2018-03-01 NOTE — H&P ADULT - PROBLEM SELECTOR PLAN 3
unclear etiology, possibly baseline  will start patient on IVF for now  check US kidney given presence of flank pain and history of kidney stone  monitor BMP  hold chlorthalidone and lisinopril for now

## 2018-03-01 NOTE — H&P ADULT - NSHPREVIEWOFSYSTEMS_GEN_ALL_CORE
CONSTITUTIONAL: No weakness, fevers or chills  EYES/ENT: No visual changes;  No dysphagia  NECK: No pain or stiffness  RESPIRATORY: No cough, wheezing, hemoptysis; No shortness of breath  CARDIOVASCULAR: +chest discomfort, palpitation  EXTREMITIES: no le edema, cyanosis, clubbing  MUSCULOSKELETAL: no joint pain, swelling  GASTROINTESTINAL: No abdominal or epigastric pain. No nausea, vomiting, or hematemesis; No diarrhea or constipation. No melena or hematochezia.  BACK: No back pain  GENITOURINARY: No dysuria, frequency or hematuria; +right flank pain  NEUROLOGICAL: No numbness or weakness  SKIN: No itching, burning, rashes, or lesions   PSYCH: no agitation  All other review of systems is negative unless indicated above.

## 2018-03-01 NOTE — H&P ADULT - PROBLEM SELECTOR PLAN 1
chest discomfort possibly unstable angina  s/p aspirin 324mg in ED  will restart patient on home medication aspirin 81mg and plavix 75mg  ?heparin gttt (will defer to cardiology)  troponin x 1 negative; will continue to trend; check TSH, A1c  monitor in telemetry  keep NPO for now for possible cardiac cath  start patient on metoprolol succ 25mg daily  Discussed plan with night cardiologist, who stated will see patient

## 2018-03-01 NOTE — DISCHARGE NOTE ADULT - ADDITIONAL INSTRUCTIONS
Please call to schedule an appointment with your cardiologist within 3-5 days to schedule an echocardiogram as outpatient

## 2018-03-01 NOTE — DISCHARGE NOTE ADULT - PLAN OF CARE
resolved Follow up with your cardiologist within 1 week   continue Cardiac Medication   HOME CARE INSTRUCTIONS  For the next few days, avoid physical activities that bring on chest pain. Continue physical activities as directed.  Do not smoke.  Avoid drinking alcohol.   Only take over-the-counter or prescription medicine for pain, discomfort, or fever as directed by your caregiver.  Follow your caregiver's suggestions for further testing if your chest pain does not go away.  Keep any follow-up appointments you made. If you do not go to an appointment, you could develop lasting (chronic) problems with pain. If there is any problem keeping an appointment, you must call to reschedule.   SEEK MEDICAL CARE IF:  You think you are having problems from the medicine you are taking. Read your medicine instructions carefully.  Your chest pain does not go away, even after treatment.  You develop a rash with blisters on your chest.  SEEK IMMEDIATE MEDICAL CARE IF:  You have increased chest pain or pain that spreads to your arm, neck, jaw, back, or abdomen.   You develop shortness of breath, an increasing cough, or you are coughing up blood.  You have severe back or abdominal pain, feel nauseous, or vomit.  You develop severe weakness, fainting, or chills.  You have a fever.  THIS IS AN EMERGENCY. Do not wait to see if the pain will go away. Get medical help at once. Call your local emergency services  911 . Do not drive yourself to the hospital. Coronary artery disease is a condition where the arteries the supply the heart muscle get clogges with fatty deposits & puts you at risk for a heart attack  Call your doctor if you have any new pain, pressure, or discomfort in the center of your chest, pain, tingling or discomfort in arms, back, neck, jaw, or stomach, shortness of breath, nausea, vomiting, burping or heartburn, sweating, cold and clammy skin, racing or abnormal heartbeat for more than 10 minutes or if they keep coming & going.  Call 911 and do not tr to get to hospital by care  You can help yourself with lefestyle changes (quitting smoking if you smoke), eat lots of fruits & vegetables & low fat dairy products, not a lot of meat & fatty foods, walk or some form of physical activity most days of the week, lose weight if you are overweight  Take your cardiac medication as prescribed to lower cholesterol, to lower blood pressure, aspirin to prevent blood clots, and diabetes control  Make sure to keep appointments with doctor for cardiac follow up care Low salt diet  Activity as tolerated.  Take all medication as prescribed.  Follow up with your medical doctor for routine blood pressure monitoring at your next visit.  Notify your doctor if you have any of the following symptoms:   Dizziness, Lightheadedness, Blurry vision, Headache, Chest pain, Shortness of breath

## 2018-03-01 NOTE — CONSULT NOTE ADULT - PROBLEM SELECTOR RECOMMENDATION 9
CCS class III.  Continue ASA 81mg po daily, plavix 75mg po daily, start metoprolol xl 25mg po daily, lipitor 40mg po daily.  TTE for evaluation of LV function and r/o valvular abnormality.   CR 1.41, hold lisinopril, start normal saline 75cc/hr.  Will schedule patient for cardiac cath with Dr. Jett Frey.

## 2018-03-01 NOTE — PROGRESS NOTE ADULT - PROBLEM SELECTOR PLAN 2
CAD s/p recent stents on 2/9/18  -c/w DAPT with aspirin, plavix, atorvastatin 40mg daily  -c/w metoprolol succ 25mg daily

## 2018-03-01 NOTE — ED ADULT NURSE REASSESSMENT NOTE - NS ED NURSE REASSESS COMMENT FT1
Pt OK to go to floor off tele as per MD Blue. No c/o CP or SOB at this time, rate controlled NSR. Comfort and safety maintained. Pt OK to go to floor off tele as per MD Garcia. No c/o CP or SOB at this time, rate controlled NSR. Comfort and safety maintained.

## 2018-03-01 NOTE — PROGRESS NOTE ADULT - PROBLEM SELECTOR PLAN 5
stable  c/w norvasc 5mg daily  c/w metoprolol 25mg ER daily  restart home lisinorpil and chlorthalidone on discharge

## 2018-03-01 NOTE — H&P ADULT - HISTORY OF PRESENT ILLNESS
61 yo male with PMH of HTN, CAD, Stents x 2 to LAD, presents with Chest discomfort.  Patient states that he had stents to LAD 3 weeks ago, and was planned for delayed PCI to RCA on March 16th.  Patient states that on Monday, patient was not feeling well.  He had intermittent chest discomfort and abdominal discomfort all throughout day.  He says walking made symptoms go away.  Symptoms were associated with lightheadedness.  He called his cardiologist on Monday, who prescribed BB, but patient had nor received it yet.  Patient states that he felt well on Tuesday.  Last night he was lying on the couch and started having similar symptoms again.  He also felt like his heart was racing.  He denies any associated sweating or SOB.  He also denies cough, fever, nausea, vomiting.  In ED, patient received 324mg of aspirin which apparently relieved his symptoms.  Currently he denies any chest discomfort.  Patient also states that he has been having right flank pain for last one week.  He denies any dysuria or hematuria.  Denies heavy lifting.

## 2018-03-01 NOTE — PROGRESS NOTE ADULT - PROBLEM SELECTOR PLAN 3
CHESTER resolved, likely mild dehydration on chlorthalidone/ACE-I as patient does not drink much  d/c renal US as no flank pain or s/s of stone with creatinine back to baseline  monitor BMP  restart chlorthalidone and lisinopril on discharge

## 2018-03-01 NOTE — CONSULT NOTE ADULT - PROBLEM SELECTOR RECOMMENDATION 2
Continue cardiac medications.   EKG negative for ischemic changes, first set of cardiac enzymes are negative.

## 2018-03-01 NOTE — H&P ADULT - FAMILY HISTORY
Family history of acute myocardial infarction     Family history of cerebrovascular accident (CVA)     Sibling  Still living? Unknown  Family history of cardiac pacemaker, Age at diagnosis: Age Unknown

## 2018-03-01 NOTE — PROGRESS NOTE ADULT - ATTENDING COMMENTS
possible discharge later today, f/u 2d echo if done, will monitor until this evening if no chest pain can go home per cards, with outpatient cards followup and staged PCI planned fro March 16  spent 42 minutes on discharge process

## 2018-03-05 ENCOUNTER — MEDICATION RENEWAL (OUTPATIENT)
Age: 63
End: 2018-03-05

## 2018-03-16 ENCOUNTER — INPATIENT (INPATIENT)
Facility: HOSPITAL | Age: 63
LOS: 0 days | Discharge: HOME | DRG: 247 | End: 2018-03-17
Attending: INTERNAL MEDICINE | Admitting: INTERNAL MEDICINE
Payer: COMMERCIAL

## 2018-03-16 ENCOUNTER — TRANSCRIPTION ENCOUNTER (OUTPATIENT)
Age: 63
End: 2018-03-16

## 2018-03-16 VITALS
TEMPERATURE: 98 F | WEIGHT: 205.91 LBS | SYSTOLIC BLOOD PRESSURE: 135 MMHG | DIASTOLIC BLOOD PRESSURE: 68 MMHG | HEART RATE: 71 BPM | OXYGEN SATURATION: 99 % | HEIGHT: 70 IN

## 2018-03-16 DIAGNOSIS — I25.10 ATHEROSCLEROTIC HEART DISEASE OF NATIVE CORONARY ARTERY WITHOUT ANGINA PECTORIS: Chronic | ICD-10-CM

## 2018-03-16 DIAGNOSIS — I25.10 ATHEROSCLEROTIC HEART DISEASE OF NATIVE CORONARY ARTERY WITHOUT ANGINA PECTORIS: ICD-10-CM

## 2018-03-16 LAB
ANION GAP SERPL CALC-SCNC: 14 MMOL/L — SIGNIFICANT CHANGE UP (ref 5–17)
BUN SERPL-MCNC: 27 MG/DL — HIGH (ref 7–23)
CALCIUM SERPL-MCNC: 9.9 MG/DL — SIGNIFICANT CHANGE UP (ref 8.4–10.5)
CHLORIDE SERPL-SCNC: 98 MMOL/L — SIGNIFICANT CHANGE UP (ref 96–108)
CO2 SERPL-SCNC: 26 MMOL/L — SIGNIFICANT CHANGE UP (ref 22–31)
CREAT SERPL-MCNC: 1.51 MG/DL — HIGH (ref 0.5–1.3)
GLUCOSE SERPL-MCNC: 111 MG/DL — HIGH (ref 70–99)
HCT VFR BLD CALC: 39.7 % — SIGNIFICANT CHANGE UP (ref 39–50)
HGB BLD-MCNC: 14.1 G/DL — SIGNIFICANT CHANGE UP (ref 13–17)
MCHC RBC-ENTMCNC: 32.9 PG — SIGNIFICANT CHANGE UP (ref 27–34)
MCHC RBC-ENTMCNC: 35.4 GM/DL — SIGNIFICANT CHANGE UP (ref 32–36)
MCV RBC AUTO: 93 FL — SIGNIFICANT CHANGE UP (ref 80–100)
PLATELET # BLD AUTO: 192 K/UL — SIGNIFICANT CHANGE UP (ref 150–400)
POTASSIUM SERPL-MCNC: 4.6 MMOL/L — SIGNIFICANT CHANGE UP (ref 3.5–5.3)
POTASSIUM SERPL-SCNC: 4.6 MMOL/L — SIGNIFICANT CHANGE UP (ref 3.5–5.3)
RBC # BLD: 4.27 M/UL — SIGNIFICANT CHANGE UP (ref 4.2–5.8)
RBC # FLD: 11.5 % — SIGNIFICANT CHANGE UP (ref 10.3–14.5)
SODIUM SERPL-SCNC: 138 MMOL/L — SIGNIFICANT CHANGE UP (ref 135–145)
WBC # BLD: 7.1 K/UL — SIGNIFICANT CHANGE UP (ref 3.8–10.5)
WBC # FLD AUTO: 7.1 K/UL — SIGNIFICANT CHANGE UP (ref 3.8–10.5)

## 2018-03-16 PROCEDURE — 99152 MOD SED SAME PHYS/QHP 5/>YRS: CPT

## 2018-03-16 PROCEDURE — 92928 PRQ TCAT PLMT NTRAC ST 1 LES: CPT | Mod: RC

## 2018-03-16 PROCEDURE — 93010 ELECTROCARDIOGRAM REPORT: CPT

## 2018-03-16 RX ORDER — METOPROLOL TARTRATE 50 MG
25 TABLET ORAL DAILY
Qty: 0 | Refills: 0 | Status: DISCONTINUED | OUTPATIENT
Start: 2018-03-16 | End: 2018-03-17

## 2018-03-16 RX ORDER — LISINOPRIL 2.5 MG/1
40 TABLET ORAL DAILY
Qty: 0 | Refills: 0 | Status: DISCONTINUED | OUTPATIENT
Start: 2018-03-16 | End: 2018-03-17

## 2018-03-16 RX ORDER — ASPIRIN/CALCIUM CARB/MAGNESIUM 324 MG
81 TABLET ORAL DAILY
Qty: 0 | Refills: 0 | Status: DISCONTINUED | OUTPATIENT
Start: 2018-03-16 | End: 2018-03-17

## 2018-03-16 RX ORDER — PANTOPRAZOLE SODIUM 20 MG/1
40 TABLET, DELAYED RELEASE ORAL
Qty: 0 | Refills: 0 | Status: DISCONTINUED | OUTPATIENT
Start: 2018-03-16 | End: 2018-03-17

## 2018-03-16 RX ORDER — ATORVASTATIN CALCIUM 80 MG/1
40 TABLET, FILM COATED ORAL AT BEDTIME
Qty: 0 | Refills: 0 | Status: DISCONTINUED | OUTPATIENT
Start: 2018-03-16 | End: 2018-03-17

## 2018-03-16 RX ORDER — ACETAMINOPHEN 500 MG
650 TABLET ORAL ONCE
Qty: 0 | Refills: 0 | Status: DISCONTINUED | OUTPATIENT
Start: 2018-03-16 | End: 2018-03-16

## 2018-03-16 RX ORDER — SODIUM CHLORIDE 9 MG/ML
1000 INJECTION INTRAMUSCULAR; INTRAVENOUS; SUBCUTANEOUS
Qty: 0 | Refills: 0 | Status: DISCONTINUED | OUTPATIENT
Start: 2018-03-16 | End: 2018-03-16

## 2018-03-16 RX ORDER — CLOPIDOGREL BISULFATE 75 MG/1
75 TABLET, FILM COATED ORAL DAILY
Qty: 0 | Refills: 0 | Status: DISCONTINUED | OUTPATIENT
Start: 2018-03-16 | End: 2018-03-17

## 2018-03-16 RX ORDER — CLOPIDOGREL BISULFATE 75 MG/1
1 TABLET, FILM COATED ORAL
Qty: 30 | Refills: 11
Start: 2018-03-16 | End: 2019-03-10

## 2018-03-16 RX ORDER — ASPIRIN/CALCIUM CARB/MAGNESIUM 324 MG
1 TABLET ORAL
Qty: 30 | Refills: 11
Start: 2018-03-16 | End: 2019-03-10

## 2018-03-16 RX ORDER — AMLODIPINE BESYLATE 2.5 MG/1
5 TABLET ORAL DAILY
Qty: 0 | Refills: 0 | Status: DISCONTINUED | OUTPATIENT
Start: 2018-03-16 | End: 2018-03-17

## 2018-03-16 RX ADMIN — ATORVASTATIN CALCIUM 40 MILLIGRAM(S): 80 TABLET, FILM COATED ORAL at 21:43

## 2018-03-16 NOTE — DISCHARGE NOTE ADULT - MEDICATION SUMMARY - MEDICATIONS TO TAKE
I will START or STAY ON the medications listed below when I get home from the hospital:    aspirin 81 mg oral delayed release tablet  -- 1 tab(s) by mouth once a day  home/hospital  -- Indication: For TO KEEP STENT OPEN     lisinopril 40 mg oral tablet  -- 1 tab(s) by mouth once a day  home/hospital  -- Indication: For High blood pressure     atorvastatin 40 mg oral tablet  -- 1 tab(s) by mouth once a day (at bedtime)  home/hospital  -- Indication: For High cholesterol     clopidogrel 75 mg oral tablet  -- 1 tab(s) by mouth once a day  -- Indication: For TO KEEP STENT OPEN     metoprolol succinate 25 mg oral tablet, extended release  -- 1 tab(s) by mouth once a day  -- Indication: For High blood pressure     amLODIPine 5 mg oral tablet  -- 1 tab(s) by mouth once a day  home/hospital  -- Indication: For High blood pressure     chlorthalidone 25 mg oral tablet  -- 1 tab(s) by mouth every other day  home/hospital  -- Indication: For High blood pressure     pantoprazole 40 mg oral delayed release tablet  -- 1 tab(s) by mouth once a day (before a meal)  -- Indication: For GERD

## 2018-03-16 NOTE — CHART NOTE - NSCHARTNOTEFT_GEN_A_CORE
Patient underwent a PCI procedure and is being admitted as they are at increased risk for major adverse cardiac and vascular events if discharged due to the following high risk characteristics:      Pre- Procedural Clinical Criteria  eg: Stable CCS II    Admit- patient underwent a PCI procedure and is being admitted due to high risk characteristicts and is considered to be at an increased risk of major adverse cardiovascular events if discharged at this time   -S/P PCI to Cx (70%) x 1 DELORIS  -S/P PCI of mid RCA X 1 DELORIS

## 2018-03-16 NOTE — DISCHARGE NOTE ADULT - PATIENT PORTAL LINK FT
You can access the [facility] Patient Portal, offered by [facility], by registering with the following website: [email or web address]

## 2018-03-16 NOTE — DISCHARGE NOTE ADULT - CARE PROVIDER_API CALL
Rufus Lemus (MD), Cardiovascular Disease  [address]  [address] [place] [address]  Phone: [phone]  Fax: [phone]

## 2018-03-16 NOTE — DISCHARGE NOTE ADULT - CARE PLAN
Principal Discharge DX:	CAD (coronary artery disease)  Goal:	Pt remains chest pain free and understands post cath discharge instructions  Assessment and plan of treatment:	No heavy lifting or pushing/pulling with procedure arm for 2 weeks. No driving for 2 days. You may shower 24 hours following the procedure but avoid baths/swimming for 1 week. Check your wrist site for bleeding and/or swelling daily following procedure and call your doctor immediately if it occurs or if you experience increased pain at the site. Follow up with your cardiologist in 1-2 weeks. You may call [facility] if you have any questions/concerns regarding your procedure [phone]. [phone]  Secondary Diagnosis:	HLD (hyperlipidemia)  Goal:	Your LDL cholesterol will be less than 70mg/dL  Assessment and plan of treatment:	Continue with your cholesterol medications. Eat a heart healthy diet that is low in saturated fats and salt, and includes whole grains, fruits, vegetables and lean protein; exercise regularly (consult with your physician or cardiologist first); maintain a heart healthy weight. Continue to follow with your primary physician or cardiologist for treatment goals, continue medication, have liver function testing every 3 months as anti lipid medications can cause liver irritation. If you smoke - quit (A resource to help you stop smoking is the [facility] for Tobacco Control – phone number 625-193-3427.).  Secondary Diagnosis:	Hypertension  Goal:	Your blood pressure will be controlled.  Assessment and plan of treatment:	Continue with your blood pressure medications; eat a heart healthy diet with low salt diet; exercise regularly (consult with your physician or cardiologist first); maintain a heart healthy weight; if you smoke - quit (A resource to help you stop smoking is the [facility] for Tobacco Control – phone number [phone].); include healthy ways to manage stress. Continue to follow with your primary care physician or cardiologist.

## 2018-03-16 NOTE — H&P CARDIOLOGY - HISTORY OF PRESENT ILLNESS
This is a 63 yo man with history of HTN, CAD, HLD fatty liver, Gout who had recent PCI 2/18 presents today for staged PCI. Pt had chest pain in 2/18 and was transferred from [place] for cardiac cath and had 2 stents placed in LAD. RCA planned PCI for this morning.   CARDIAC CATH 2/15/18  RCA:   --  Mid RCA: There was a tubular 60 % stenosis. There was KAVITA grade  3 flow through the vessel (brisk flow). This is a likely culprit for the  patient's clinical presentation. An intervention was performed.  COMPLICATIONS: There were no complications.  DIAGNOSTIC RECOMMENDATIONS:  1. Successful PCI to the pLAD (3.5mm Resolute [place] DELORIS) and mLAD (3.5mm  Resolute [place] DELORIS) and POBA to the 2.0mm to the D1 in the setting of  angina.  2. DAPT.  INTERVENTIONAL RECOMMENDATIONS:  1. Successful PCI to the pLAD (3.5mm Resolute [place] DELORIS) and mLAD (3.5mm  Resolute [place] DELORIS) and POBA to the 2.0mm to the D1 in the setting of  angina.  2. DAPT.  Prepared and signed by  Jett Frey M.D.  Signed 02/15/2018 14:00:15       Dr. Calzada cardiologist  Dr. White Primary MD - [place]

## 2018-03-16 NOTE — DISCHARGE NOTE ADULT - PLAN OF CARE
Pt remains chest pain free and understands post cath discharge instructions No heavy lifting or pushing/pulling with procedure arm for 2 weeks. No driving for 2 days. You may shower 24 hours following the procedure but avoid baths/swimming for 1 week. Check your wrist site for bleeding and/or swelling daily following procedure and call your doctor immediately if it occurs or if you experience increased pain at the site. Follow up with your cardiologist in 1-2 weeks. You may call [facility] if you have any questions/concerns regarding your procedure [phone]. [phone] Your LDL cholesterol will be less than 70mg/dL Continue with your cholesterol medications. Eat a heart healthy diet that is low in saturated fats and salt, and includes whole grains, fruits, vegetables and lean protein; exercise regularly (consult with your physician or cardiologist first); maintain a heart healthy weight. Continue to follow with your primary physician or cardiologist for treatment goals, continue medication, have liver function testing every 3 months as anti lipid medications can cause liver irritation. If you smoke - quit (A resource to help you stop smoking is the [facility] Tobacco [facility] – phone number 889-431-0985.). Your blood pressure will be controlled. Continue with your blood pressure medications; eat a heart healthy diet with low salt diet; exercise regularly (consult with your physician or cardiologist first); maintain a heart healthy weight; if you smoke - quit (A resource to help you stop smoking is the [facility] Tobacco [facility] – phone number 638-026-9240.); include healthy ways to manage stress. Continue to follow with your primary care physician or cardiologist.

## 2018-03-16 NOTE — DISCHARGE NOTE ADULT - HOSPITAL COURSE
This is a 63 yo man with history of HTN, CAD, HLD fatty liver, Gout who had recent PCI 2/18 presents today for staged PCI. Pt had chest pain in 2/18 and was transferred from [place] for cardiac cath and had 2 stents placed in LAD. RCA planned PCI for this morning. Pt is now s/p cardiac cath DELORIS x 1 CX and DELORIS x 1 mRCA via right radial artery access This is a 63 yo man with history of HTN, CAD, HLD fatty liver, Gout who had recent PCI 2/18 presents today for staged PCI. Pt had chest pain in 2/18 and was transferred from [place] for cardiac cath and had 2 stents placed in LAD. RCA planned PCI for this morning. Pt is now s/p cardiac cath DELORIS x 1 CX and DELORIS x 1 mRCA via right radial artery access. Pt tolerated the procedure well, cardiac cath site benign. Post procedure discharge instructions discussed and questions addressed.

## 2018-03-17 VITALS
HEART RATE: 62 BPM | TEMPERATURE: 98 F | RESPIRATION RATE: 18 BRPM | DIASTOLIC BLOOD PRESSURE: 68 MMHG | OXYGEN SATURATION: 96 % | SYSTOLIC BLOOD PRESSURE: 122 MMHG

## 2018-03-17 LAB
ANION GAP SERPL CALC-SCNC: 14 MMOL/L — SIGNIFICANT CHANGE UP (ref 5–17)
BASOPHILS # BLD AUTO: 0.1 K/UL — SIGNIFICANT CHANGE UP (ref 0–0.2)
BASOPHILS NFR BLD AUTO: 0.7 % — SIGNIFICANT CHANGE UP (ref 0–2)
BUN SERPL-MCNC: 22 MG/DL — SIGNIFICANT CHANGE UP (ref 7–23)
CALCIUM SERPL-MCNC: 9 MG/DL — SIGNIFICANT CHANGE UP (ref 8.4–10.5)
CHLORIDE SERPL-SCNC: 100 MMOL/L — SIGNIFICANT CHANGE UP (ref 96–108)
CO2 SERPL-SCNC: 24 MMOL/L — SIGNIFICANT CHANGE UP (ref 22–31)
CREAT SERPL-MCNC: 1.25 MG/DL — SIGNIFICANT CHANGE UP (ref 0.5–1.3)
EOSINOPHIL # BLD AUTO: 0.3 K/UL — SIGNIFICANT CHANGE UP (ref 0–0.5)
EOSINOPHIL NFR BLD AUTO: 4.2 % — SIGNIFICANT CHANGE UP (ref 0–6)
GLUCOSE SERPL-MCNC: 107 MG/DL — HIGH (ref 70–99)
HCT VFR BLD CALC: 37.7 % — LOW (ref 39–50)
HGB BLD-MCNC: 13.2 G/DL — SIGNIFICANT CHANGE UP (ref 13–17)
LYMPHOCYTES # BLD AUTO: 1.6 K/UL — SIGNIFICANT CHANGE UP (ref 1–3.3)
LYMPHOCYTES # BLD AUTO: 19.6 % — SIGNIFICANT CHANGE UP (ref 13–44)
MCHC RBC-ENTMCNC: 32.6 PG — SIGNIFICANT CHANGE UP (ref 27–34)
MCHC RBC-ENTMCNC: 35 GM/DL — SIGNIFICANT CHANGE UP (ref 32–36)
MCV RBC AUTO: 93.1 FL — SIGNIFICANT CHANGE UP (ref 80–100)
MONOCYTES # BLD AUTO: 0.8 K/UL — SIGNIFICANT CHANGE UP (ref 0–0.9)
MONOCYTES NFR BLD AUTO: 10.1 % — SIGNIFICANT CHANGE UP (ref 2–14)
NEUTROPHILS # BLD AUTO: 5.2 K/UL — SIGNIFICANT CHANGE UP (ref 1.8–7.4)
NEUTROPHILS NFR BLD AUTO: 65.4 % — SIGNIFICANT CHANGE UP (ref 43–77)
PLATELET # BLD AUTO: 184 K/UL — SIGNIFICANT CHANGE UP (ref 150–400)
POTASSIUM SERPL-MCNC: 3.9 MMOL/L — SIGNIFICANT CHANGE UP (ref 3.5–5.3)
POTASSIUM SERPL-SCNC: 3.9 MMOL/L — SIGNIFICANT CHANGE UP (ref 3.5–5.3)
RBC # BLD: 4.05 M/UL — LOW (ref 4.2–5.8)
RBC # FLD: 11.7 % — SIGNIFICANT CHANGE UP (ref 10.3–14.5)
SODIUM SERPL-SCNC: 138 MMOL/L — SIGNIFICANT CHANGE UP (ref 135–145)
WBC # BLD: 7.9 K/UL — SIGNIFICANT CHANGE UP (ref 3.8–10.5)
WBC # FLD AUTO: 7.9 K/UL — SIGNIFICANT CHANGE UP (ref 3.8–10.5)

## 2018-03-17 PROCEDURE — C1769: CPT

## 2018-03-17 PROCEDURE — 99153 MOD SED SAME PHYS/QHP EA: CPT

## 2018-03-17 PROCEDURE — C9600: CPT | Mod: RC

## 2018-03-17 PROCEDURE — 85027 COMPLETE CBC AUTOMATED: CPT

## 2018-03-17 PROCEDURE — 93005 ELECTROCARDIOGRAM TRACING: CPT

## 2018-03-17 PROCEDURE — C1874: CPT

## 2018-03-17 PROCEDURE — C1887: CPT

## 2018-03-17 PROCEDURE — C1725: CPT

## 2018-03-17 PROCEDURE — 99152 MOD SED SAME PHYS/QHP 5/>YRS: CPT

## 2018-03-17 PROCEDURE — 80048 BASIC METABOLIC PNL TOTAL CA: CPT

## 2018-03-17 PROCEDURE — C1894: CPT

## 2018-03-17 RX ADMIN — Medication 81 MILLIGRAM(S): at 05:15

## 2018-03-17 RX ADMIN — PANTOPRAZOLE SODIUM 40 MILLIGRAM(S): 20 TABLET, DELAYED RELEASE ORAL at 05:17

## 2018-03-17 RX ADMIN — CLOPIDOGREL BISULFATE 75 MILLIGRAM(S): 75 TABLET, FILM COATED ORAL at 05:15

## 2018-03-17 RX ADMIN — Medication 25 MILLIGRAM(S): at 05:15

## 2018-03-17 RX ADMIN — LISINOPRIL 40 MILLIGRAM(S): 2.5 TABLET ORAL at 05:17

## 2018-03-17 RX ADMIN — AMLODIPINE BESYLATE 5 MILLIGRAM(S): 2.5 TABLET ORAL at 05:15

## 2018-03-22 ENCOUNTER — RX RENEWAL (OUTPATIENT)
Age: 63
End: 2018-03-22

## 2018-04-24 ENCOUNTER — APPOINTMENT (OUTPATIENT)
Dept: CARDIOLOGY | Facility: CLINIC | Age: 63
End: 2018-04-24

## 2018-05-01 ENCOUNTER — APPOINTMENT (OUTPATIENT)
Dept: CARDIOLOGY | Facility: CLINIC | Age: 63
End: 2018-05-01

## 2018-05-15 ENCOUNTER — APPOINTMENT (OUTPATIENT)
Dept: CARDIOLOGY | Facility: CLINIC | Age: 63
End: 2018-05-15
Payer: COMMERCIAL

## 2018-05-15 ENCOUNTER — NON-APPOINTMENT (OUTPATIENT)
Age: 63
End: 2018-05-15

## 2018-05-15 VITALS — DIASTOLIC BLOOD PRESSURE: 88 MMHG | HEART RATE: 67 BPM | SYSTOLIC BLOOD PRESSURE: 164 MMHG

## 2018-05-15 PROCEDURE — 93000 ELECTROCARDIOGRAM COMPLETE: CPT

## 2018-05-15 PROCEDURE — 99215 OFFICE O/P EST HI 40 MIN: CPT

## 2018-05-15 RX ORDER — CHLORTHALIDONE 25 MG/1
25 TABLET ORAL
Refills: 0 | Status: DISCONTINUED | COMMUNITY
End: 2018-05-15

## 2018-07-23 ENCOUNTER — RX RENEWAL (OUTPATIENT)
Age: 63
End: 2018-07-23

## 2018-07-25 ENCOUNTER — MEDICATION RENEWAL (OUTPATIENT)
Age: 63
End: 2018-07-25

## 2018-09-13 PROBLEM — K76.0 FATTY (CHANGE OF) LIVER, NOT ELSEWHERE CLASSIFIED: Chronic | Status: ACTIVE | Noted: 2018-02-09

## 2018-09-13 PROBLEM — E78.5 HYPERLIPIDEMIA, UNSPECIFIED: Chronic | Status: ACTIVE | Noted: 2018-02-09

## 2018-09-13 PROBLEM — N20.0 CALCULUS OF KIDNEY: Chronic | Status: ACTIVE | Noted: 2018-02-09

## 2018-09-13 PROBLEM — I25.10 ATHEROSCLEROTIC HEART DISEASE OF NATIVE CORONARY ARTERY WITHOUT ANGINA PECTORIS: Chronic | Status: ACTIVE | Noted: 2018-03-01

## 2018-11-13 ENCOUNTER — NON-APPOINTMENT (OUTPATIENT)
Age: 63
End: 2018-11-13

## 2018-11-13 ENCOUNTER — APPOINTMENT (OUTPATIENT)
Dept: CARDIOLOGY | Facility: CLINIC | Age: 63
End: 2018-11-13
Payer: COMMERCIAL

## 2018-11-13 VITALS
OXYGEN SATURATION: 98 % | HEART RATE: 69 BPM | WEIGHT: 213 LBS | HEIGHT: 70 IN | SYSTOLIC BLOOD PRESSURE: 147 MMHG | DIASTOLIC BLOOD PRESSURE: 84 MMHG | BODY MASS INDEX: 30.49 KG/M2

## 2018-11-13 PROCEDURE — 93000 ELECTROCARDIOGRAM COMPLETE: CPT

## 2018-11-13 PROCEDURE — 99214 OFFICE O/P EST MOD 30 MIN: CPT

## 2018-11-13 RX ORDER — METOPROLOL SUCCINATE 25 MG/1
25 TABLET, EXTENDED RELEASE ORAL
Qty: 90 | Refills: 3 | Status: DISCONTINUED | COMMUNITY
Start: 2018-02-27 | End: 2018-11-13

## 2018-12-19 NOTE — HISTORY OF PRESENT ILLNESS
[FreeTextEntry1] : Mr. Prado is a 62 year-old man with known hypertension, hyperlipidemia and CAD with recent cath demonstrating LAD/Diag disease thus had PCI, successfully. He is doing well.

## 2018-12-19 NOTE — DISCUSSION/SUMMARY
[FreeTextEntry1] : Mr. Prado is a 62 year-old man with known CAD s/p PCI.\par \par Plan:\par 1. Given the PCI c/w DAPT and OMT for now.\par 2. Diet and exercise reiterated to improve overall outcomes and reduce future events.\par 3. Old records requested and reviewed with performing physician.\par 4. Primary and secondary prevention of cardiovascular and related conditions discussed at length, including but not limited to diet and lifestyle modification.\par 5. Patient to return to the office in 1-3 months.\par \par Thank you for allowing me to participate in the care of your patient. If you have any questions, please feel free to contact me at [phone] or via email at [email or web address].\par \par Sincerely,\par \par Jett Frey MD [facility]

## 2018-12-19 NOTE — PHYSICAL EXAM
[General Appearance - Well Developed] : well developed [Normal Appearance] : normal appearance [Well Groomed] : well groomed [General Appearance - Well Nourished] : well nourished [No Deformities] : no deformities [General Appearance - In No Acute Distress] : no acute distress [Normal Conjunctiva] : the conjunctiva exhibited no abnormalities [Eyelids - No Xanthelasma] : the eyelids demonstrated no xanthelasmas [Normal Oral Mucosa] : normal oral mucosa [No Oral Pallor] : no oral pallor [No Oral Cyanosis] : no oral cyanosis [Normal Jugular Venous A Waves Present] : normal jugular venous A waves present [Normal Jugular Venous V Waves Present] : normal jugular venous V waves present [No Jugular Venous Barnes A Waves] : no jugular venous barnes A waves [Respiration, Rhythm And Depth] : normal respiratory rhythm and effort [Exaggerated Use Of Accessory Muscles For Inspiration] : no accessory muscle use [Auscultation Breath Sounds / Voice Sounds] : lungs were clear to auscultation bilaterally [Heart Rate And Rhythm] : heart rate and rhythm were normal [Heart Sounds] : normal S1 and S2 [Murmurs] : no murmurs present [Abdomen Soft] : soft [Abdomen Tenderness] : non-tender [Abdomen Mass (___ Cm)] : no abdominal mass palpated [Abnormal Walk] : normal gait [Gait - Sufficient For Exercise Testing] : the gait was sufficient for exercise testing [Nail Clubbing] : no clubbing of the fingernails [Cyanosis, Localized] : no localized cyanosis [Petechial Hemorrhages (___cm)] : no petechial hemorrhages [Skin Color & Pigmentation] : normal skin color and pigmentation [] : no rash [No Venous Stasis] : no venous stasis [Skin Lesions] : no skin lesions [No Skin Ulcers] : no skin ulcer [No Xanthoma] : no  xanthoma was observed [Oriented To Time, Place, And Person] : oriented to person, place, and time [Affect] : the affect was normal [Mood] : the mood was normal [No Anxiety] : not feeling anxious

## 2019-01-14 ENCOUNTER — RX RENEWAL (OUTPATIENT)
Age: 64
End: 2019-01-14

## 2019-02-25 ENCOUNTER — RX RENEWAL (OUTPATIENT)
Age: 64
End: 2019-02-25

## 2019-02-27 ENCOUNTER — RX RENEWAL (OUTPATIENT)
Age: 64
End: 2019-02-27

## 2019-03-11 ENCOUNTER — APPOINTMENT (OUTPATIENT)
Dept: CV DIAGNOSITCS | Facility: HOSPITAL | Age: 64
End: 2019-03-11

## 2019-03-11 ENCOUNTER — OUTPATIENT (OUTPATIENT)
Dept: OUTPATIENT SERVICES | Facility: HOSPITAL | Age: 64
LOS: 1 days | End: 2019-03-11
Payer: COMMERCIAL

## 2019-03-11 DIAGNOSIS — I25.10 ATHEROSCLEROTIC HEART DISEASE OF NATIVE CORONARY ARTERY WITHOUT ANGINA PECTORIS: Chronic | ICD-10-CM

## 2019-03-11 DIAGNOSIS — I25.10 ATHEROSCLEROTIC HEART DISEASE OF NATIVE CORONARY ARTERY WITHOUT ANGINA PECTORIS: ICD-10-CM

## 2019-03-11 PROCEDURE — 93306 TTE W/DOPPLER COMPLETE: CPT

## 2019-03-11 PROCEDURE — 93306 TTE W/DOPPLER COMPLETE: CPT | Mod: 26

## 2019-04-02 ENCOUNTER — APPOINTMENT (OUTPATIENT)
Dept: CARDIOLOGY | Facility: CLINIC | Age: 64
End: 2019-04-02
Payer: COMMERCIAL

## 2019-04-02 VITALS
WEIGHT: 215 LBS | HEIGHT: 70 IN | SYSTOLIC BLOOD PRESSURE: 145 MMHG | HEART RATE: 65 BPM | OXYGEN SATURATION: 98 % | DIASTOLIC BLOOD PRESSURE: 82 MMHG | BODY MASS INDEX: 30.78 KG/M2

## 2019-04-02 PROCEDURE — 99214 OFFICE O/P EST MOD 30 MIN: CPT

## 2019-04-02 PROCEDURE — 93000 ELECTROCARDIOGRAM COMPLETE: CPT

## 2019-04-02 RX ORDER — CLOPIDOGREL BISULFATE 75 MG/1
75 TABLET, FILM COATED ORAL DAILY
Qty: 90 | Refills: 3 | Status: COMPLETED | COMMUNITY
Start: 2019-01-14 | End: 2019-04-02

## 2019-04-16 NOTE — HISTORY OF PRESENT ILLNESS
[FreeTextEntry1] : Mr. Prado is a 63 year-old man with known hypertension, hyperlipidemia and CAD with recent cath demonstrating LAD/Diag disease thus had PCI, successfully. He is doing well.

## 2019-04-16 NOTE — PHYSICAL EXAM
[General Appearance - Well Developed] : well developed [Normal Appearance] : normal appearance [Well Groomed] : well groomed [General Appearance - Well Nourished] : well nourished [No Deformities] : no deformities [General Appearance - In No Acute Distress] : no acute distress [Normal Conjunctiva] : the conjunctiva exhibited no abnormalities [Normal Oral Mucosa] : normal oral mucosa [Eyelids - No Xanthelasma] : the eyelids demonstrated no xanthelasmas [No Oral Pallor] : no oral pallor [No Oral Cyanosis] : no oral cyanosis [Normal Jugular Venous A Waves Present] : normal jugular venous A waves present [Normal Jugular Venous V Waves Present] : normal jugular venous V waves present [No Jugular Venous Barnes A Waves] : no jugular venous barnes A waves [Exaggerated Use Of Accessory Muscles For Inspiration] : no accessory muscle use [Respiration, Rhythm And Depth] : normal respiratory rhythm and effort [Heart Rate And Rhythm] : heart rate and rhythm were normal [Auscultation Breath Sounds / Voice Sounds] : lungs were clear to auscultation bilaterally [Heart Sounds] : normal S1 and S2 [Murmurs] : no murmurs present [Abdomen Soft] : soft [Abdomen Tenderness] : non-tender [Abdomen Mass (___ Cm)] : no abdominal mass palpated [Abnormal Walk] : normal gait [Gait - Sufficient For Exercise Testing] : the gait was sufficient for exercise testing [Nail Clubbing] : no clubbing of the fingernails [Cyanosis, Localized] : no localized cyanosis [Petechial Hemorrhages (___cm)] : no petechial hemorrhages [Skin Color & Pigmentation] : normal skin color and pigmentation [] : no rash [No Venous Stasis] : no venous stasis [Skin Lesions] : no skin lesions [No Skin Ulcers] : no skin ulcer [No Xanthoma] : no  xanthoma was observed [Oriented To Time, Place, And Person] : oriented to person, place, and time [Affect] : the affect was normal [Mood] : the mood was normal [No Anxiety] : not feeling anxious

## 2019-04-16 NOTE — DISCUSSION/SUMMARY
[FreeTextEntry1] : Mr. Prado is a 63 year-old man with known CAD s/p PCI.\par \par Plan:\par 1. Given the PCI c/w DAPT and OMT for now.\par 2. Diet and exercise reiterated to improve overall outcomes and reduce future events.\par 3. Old records requested and reviewed with performing physician.\par 4. Primary and secondary prevention of cardiovascular and related conditions discussed at length, including but not limited to diet and lifestyle modification.\par 5. Patient to return to the office in 1-3 months.\par \par Thank you for allowing me to participate in the care of your patient. If you have any questions, please feel free to contact me at [phone] or via email at [email or web address].\par \par Sincerely,\par \par Jett Frey MD [facility]

## 2019-05-09 NOTE — ED ADULT NURSE NOTE - NS ED NURSE REPORT GIVEN TO FT
Detail Level: Simple Render Note In Bullet Format When Appropriate: No Duration Of Freeze Thaw-Cycle (Seconds): 2 Post-Care Instructions: I reviewed with the patient in detail post-care instructions. Patient is to wear sunprotection, and avoid picking at any of the treated lesions. Pt may apply Vaseline to crusted or scabbing areas. Consent: The patient's consent was obtained including but not limited to risks of crusting, scabbing, blistering, scarring, darker or lighter pigmentary change, recurrence, incomplete removal and infection. RN [address]

## 2019-07-03 ENCOUNTER — RX RENEWAL (OUTPATIENT)
Age: 64
End: 2019-07-03

## 2019-08-05 ENCOUNTER — RX RENEWAL (OUTPATIENT)
Age: 64
End: 2019-08-05

## 2019-08-27 ENCOUNTER — APPOINTMENT (OUTPATIENT)
Dept: CARDIOLOGY | Facility: CLINIC | Age: 64
End: 2019-08-27
Payer: COMMERCIAL

## 2019-08-27 VITALS
WEIGHT: 215 LBS | HEART RATE: 67 BPM | DIASTOLIC BLOOD PRESSURE: 78 MMHG | OXYGEN SATURATION: 98 % | HEIGHT: 70 IN | SYSTOLIC BLOOD PRESSURE: 155 MMHG | BODY MASS INDEX: 30.78 KG/M2

## 2019-08-27 PROCEDURE — 93000 ELECTROCARDIOGRAM COMPLETE: CPT

## 2019-08-27 PROCEDURE — 99214 OFFICE O/P EST MOD 30 MIN: CPT

## 2019-09-20 ENCOUNTER — RX RENEWAL (OUTPATIENT)
Age: 64
End: 2019-09-20

## 2019-10-22 NOTE — DISCUSSION/SUMMARY
[FreeTextEntry1] : Mr. Prado is a 63 year-old man with known CAD s/p PCI.\par \par Plan:\par 1. Given the PCI c/w DAPT and OMT for now.\par 2. Diet and exercise reiterated to improve overall outcomes and reduce future events.\par 3. Old records requested and reviewed with performing physician.\par 4. Primary and secondary prevention of cardiovascular and related conditions discussed at length, including but not limited to diet and lifestyle modification.\par 5. Patient to return to the office in 1-3 months.\par \par Thank you for allowing me to participate in the care of your patient. If you have any questions, please feel free to contact me at [phone] or via email at [email or web address].\par \par Sincerely,\par \par Jett Frey MD [facility]

## 2019-10-22 NOTE — PHYSICAL EXAM
[General Appearance - Well Developed] : well developed [Normal Appearance] : normal appearance [Well Groomed] : well groomed [General Appearance - Well Nourished] : well nourished [No Deformities] : no deformities [General Appearance - In No Acute Distress] : no acute distress [Normal Conjunctiva] : the conjunctiva exhibited no abnormalities [Eyelids - No Xanthelasma] : the eyelids demonstrated no xanthelasmas [Normal Oral Mucosa] : normal oral mucosa [No Oral Pallor] : no oral pallor [No Oral Cyanosis] : no oral cyanosis [Normal Jugular Venous A Waves Present] : normal jugular venous A waves present [Normal Jugular Venous V Waves Present] : normal jugular venous V waves present [No Jugular Venous Barnes A Waves] : no jugular venous barnes A waves [Respiration, Rhythm And Depth] : normal respiratory rhythm and effort [Exaggerated Use Of Accessory Muscles For Inspiration] : no accessory muscle use [Auscultation Breath Sounds / Voice Sounds] : lungs were clear to auscultation bilaterally [Murmurs] : no murmurs present [Heart Sounds] : normal S1 and S2 [Heart Rate And Rhythm] : heart rate and rhythm were normal [Abdomen Tenderness] : non-tender [Abdomen Soft] : soft [Abdomen Mass (___ Cm)] : no abdominal mass palpated [Abnormal Walk] : normal gait [Nail Clubbing] : no clubbing of the fingernails [Gait - Sufficient For Exercise Testing] : the gait was sufficient for exercise testing [Cyanosis, Localized] : no localized cyanosis [Petechial Hemorrhages (___cm)] : no petechial hemorrhages [Skin Color & Pigmentation] : normal skin color and pigmentation [] : no rash [No Venous Stasis] : no venous stasis [Skin Lesions] : no skin lesions [No Skin Ulcers] : no skin ulcer [Oriented To Time, Place, And Person] : oriented to person, place, and time [No Xanthoma] : no  xanthoma was observed [Affect] : the affect was normal [Mood] : the mood was normal [No Anxiety] : not feeling anxious

## 2019-12-03 ENCOUNTER — APPOINTMENT (OUTPATIENT)
Dept: ORTHOPEDIC SURGERY | Facility: CLINIC | Age: 64
End: 2019-12-03
Payer: COMMERCIAL

## 2019-12-03 VITALS — BODY MASS INDEX: 30.78 KG/M2 | WEIGHT: 215 LBS | HEIGHT: 70 IN

## 2019-12-03 PROCEDURE — 99204 OFFICE O/P NEW MOD 45 MIN: CPT

## 2019-12-03 NOTE — HISTORY OF PRESENT ILLNESS
[FreeTextEntry1] : This is the first visit over 63 years old male with a previous medical history of adult. For the last several years patient had been complaining of pain tenderness and swelling over the right knee recent x-ray MRI scan demonstrated the bone erosion over the lateral facet of the kneecap with a soft tissue mass suggestive be tophaceous gout, otherwise previous medical history is also significant for air coronary heart disease post insertion of stents

## 2019-12-03 NOTE — PHYSICAL EXAM
[FreeTextEntry1] : Physical exam reveals a healthy-looking patient in no apparent distress patient appeared to be fully alert oriented admission of the right knee demonstrates a soft tissue mass over the lateral aspect of the knee no skin hematoma at the same time examination of the right foot demonstrates a soft tissue mass overriding that both of the great toe suggesting tophaceous gout. At this stage this condition was discussed with the patient was recommended to proceed with exploration resection tophaceous gout right knee area and right great toe

## 2019-12-12 ENCOUNTER — APPOINTMENT (OUTPATIENT)
Dept: CARDIOLOGY | Facility: CLINIC | Age: 64
End: 2019-12-12
Payer: COMMERCIAL

## 2019-12-12 ENCOUNTER — OUTPATIENT (OUTPATIENT)
Dept: OUTPATIENT SERVICES | Facility: HOSPITAL | Age: 64
LOS: 1 days | End: 2019-12-12

## 2019-12-12 VITALS
SYSTOLIC BLOOD PRESSURE: 120 MMHG | HEIGHT: 70 IN | HEART RATE: 72 BPM | DIASTOLIC BLOOD PRESSURE: 80 MMHG | TEMPERATURE: 98 F | OXYGEN SATURATION: 98 % | WEIGHT: 220.02 LBS | RESPIRATION RATE: 16 BRPM

## 2019-12-12 VITALS
WEIGHT: 216 LBS | BODY MASS INDEX: 30.92 KG/M2 | SYSTOLIC BLOOD PRESSURE: 148 MMHG | OXYGEN SATURATION: 96 % | HEIGHT: 70 IN | DIASTOLIC BLOOD PRESSURE: 78 MMHG | HEART RATE: 74 BPM

## 2019-12-12 DIAGNOSIS — I25.10 ATHEROSCLEROTIC HEART DISEASE OF NATIVE CORONARY ARTERY WITHOUT ANGINA PECTORIS: Chronic | ICD-10-CM

## 2019-12-12 DIAGNOSIS — I10 ESSENTIAL (PRIMARY) HYPERTENSION: ICD-10-CM

## 2019-12-12 DIAGNOSIS — E78.00 PURE HYPERCHOLESTEROLEMIA, UNSPECIFIED: ICD-10-CM

## 2019-12-12 DIAGNOSIS — M1A.9XX0 CHRONIC GOUT, UNSPECIFIED, WITHOUT TOPHUS (TOPHI): ICD-10-CM

## 2019-12-12 LAB
ALBUMIN SERPL ELPH-MCNC: 5.1 G/DL — HIGH (ref 3.3–5)
ALP SERPL-CCNC: 60 U/L — SIGNIFICANT CHANGE UP (ref 40–120)
ALT FLD-CCNC: 59 U/L — HIGH (ref 4–41)
ANION GAP SERPL CALC-SCNC: 16 MMO/L — HIGH (ref 7–14)
AST SERPL-CCNC: 35 U/L — SIGNIFICANT CHANGE UP (ref 4–40)
BILIRUB SERPL-MCNC: 1.3 MG/DL — HIGH (ref 0.2–1.2)
BLD GP AB SCN SERPL QL: NEGATIVE — SIGNIFICANT CHANGE UP
BUN SERPL-MCNC: 20 MG/DL — SIGNIFICANT CHANGE UP (ref 7–23)
CALCIUM SERPL-MCNC: 10 MG/DL — SIGNIFICANT CHANGE UP (ref 8.4–10.5)
CHLORIDE SERPL-SCNC: 99 MMOL/L — SIGNIFICANT CHANGE UP (ref 98–107)
CO2 SERPL-SCNC: 23 MMOL/L — SIGNIFICANT CHANGE UP (ref 22–31)
CREAT SERPL-MCNC: 1.11 MG/DL — SIGNIFICANT CHANGE UP (ref 0.5–1.3)
GLUCOSE SERPL-MCNC: 92 MG/DL — SIGNIFICANT CHANGE UP (ref 70–99)
HCT VFR BLD CALC: 43.4 % — SIGNIFICANT CHANGE UP (ref 39–50)
HGB BLD-MCNC: 15 G/DL — SIGNIFICANT CHANGE UP (ref 13–17)
MCHC RBC-ENTMCNC: 31.3 PG — SIGNIFICANT CHANGE UP (ref 27–34)
MCHC RBC-ENTMCNC: 34.6 % — SIGNIFICANT CHANGE UP (ref 32–36)
MCV RBC AUTO: 90.6 FL — SIGNIFICANT CHANGE UP (ref 80–100)
NRBC # FLD: 0 K/UL — SIGNIFICANT CHANGE UP (ref 0–0)
PLATELET # BLD AUTO: 250 K/UL — SIGNIFICANT CHANGE UP (ref 150–400)
PMV BLD: 10.4 FL — SIGNIFICANT CHANGE UP (ref 7–13)
POTASSIUM SERPL-MCNC: 4.6 MMOL/L — SIGNIFICANT CHANGE UP (ref 3.5–5.3)
POTASSIUM SERPL-SCNC: 4.6 MMOL/L — SIGNIFICANT CHANGE UP (ref 3.5–5.3)
PROT SERPL-MCNC: 8.3 G/DL — SIGNIFICANT CHANGE UP (ref 6–8.3)
RBC # BLD: 4.79 M/UL — SIGNIFICANT CHANGE UP (ref 4.2–5.8)
RBC # FLD: 12.6 % — SIGNIFICANT CHANGE UP (ref 10.3–14.5)
RH IG SCN BLD-IMP: POSITIVE — SIGNIFICANT CHANGE UP
SODIUM SERPL-SCNC: 138 MMOL/L — SIGNIFICANT CHANGE UP (ref 135–145)
WBC # BLD: 6.25 K/UL — SIGNIFICANT CHANGE UP (ref 3.8–10.5)
WBC # FLD AUTO: 6.25 K/UL — SIGNIFICANT CHANGE UP (ref 3.8–10.5)

## 2019-12-12 PROCEDURE — 99214 OFFICE O/P EST MOD 30 MIN: CPT

## 2019-12-12 PROCEDURE — 93000 ELECTROCARDIOGRAM COMPLETE: CPT

## 2019-12-12 RX ORDER — SODIUM CHLORIDE 9 MG/ML
1000 INJECTION, SOLUTION INTRAVENOUS
Refills: 0 | Status: DISCONTINUED | OUTPATIENT
Start: 2019-12-19 | End: 2020-01-03

## 2019-12-12 RX ORDER — ALLOPURINOL 100 MG/1
100 TABLET ORAL DAILY
Refills: 0 | Status: ACTIVE | COMMUNITY
Start: 2019-12-12

## 2019-12-12 NOTE — DISCUSSION/SUMMARY
[FreeTextEntry1] : Mr. Prado is a 63 year-old man with known CAD s/p PCI.\par \par Plan:\par 1. Given the PCI c/w aspirin and OMT. No further CV workup at this time. Patient may proceed with procedure at present remaining on all meds k[place]-procedure. \par 2. Diet and exercise reiterated to improve overall outcomes and reduce future events.\par 3. Old records requested and reviewed with performing physician.\par 4. Primary and secondary prevention of cardiovascular and related conditions discussed at length, including but not limited to diet and lifestyle modification.\par 5. Patient to return to the office in 1-3 months.\par \par Thank you for allowing me to participate in the care of your patient. If you have any questions, please feel free to contact me at [phone] or via email at [email or web address].\par \par Sincerely,\par \par Jett Frey MD [facility]

## 2019-12-12 NOTE — H&P PST ADULT - NEGATIVE ENMT SYMPTOMS
no nose bleeds/no hearing difficulty/no ear pain/no throat pain/no dysphagia/no tinnitus/no vertigo/no sinus symptoms

## 2019-12-12 NOTE — H&P PST ADULT - HISTORY OF PRESENT ILLNESS
64 year old male presents to presurgical testing with diagnosis of pure hypercholesterolemia and chronic gout, unspecified, with tophus (tophi) scheduled for exploration excision of mass from right knee and right great toe for 12/18/19. Pt with history of gout, complaining of cyst to right knee for 4 years, and also to right great toe, with persistent symptoms despite conservative management. 64 year old male presents to presurgical testing with diagnosis of pure hypercholesterolemia and chronic gout, unspecified, with tophus (tophi) eg. Pt with history of gout, complaining of cyst to right knee for 4 years, and also to right great toe, with persistent symptoms despite conservative management.

## 2019-12-12 NOTE — H&P PST ADULT - RS GEN PE MLT RESP DETAILS PC
airway patent/respirations non-labored/no rhonchi/no rales/breath sounds equal/clear to auscultation bilaterally/good air movement/no wheezes

## 2019-12-12 NOTE — H&P PST ADULT - NSICDXPROBLEM_GEN_ALL_CORE_FT
PROBLEM DIAGNOSES  Problem: Chronic gout  Assessment and Plan: Pt is     Problem: Hypertension  Assessment and Plan: PROBLEM DIAGNOSES  Problem: Hypertension  Assessment and Plan:     Problem: Chronic gout  Assessment and Plan: Pt is

## 2019-12-12 NOTE — H&P PST ADULT - ASSESSMENT
pure hypercholesterolemia  chronic gout, unspecified, with tophus (tophi) Problem: chronic gout, unspecified, with tophus (tophi)  Assessment and Plan: Pt is exploration excision of right knee mass and right great toe for 12/18/19. Pre-op instructions provided. Pt given verbal and written instructions with teach back on chlorhexidine shampoo and pepcid. Pt verbalized understanding with return demonstration.     BG precautions, OR booking notified    Problem: HTN  Assessment and Plan: Pt instructed to take lisinopril and amlodipine on the morning of procedure.    Problem: CAD  Assessment and Plan: CAD s/p cardiac catheterization DELORIS x4 on ASA to continue. Cardiologist evaluation in chart. Pending copy of ekg from 12/12/19.

## 2019-12-12 NOTE — H&P PST ADULT - NEGATIVE NEUROLOGICAL SYMPTOMS
no transient paralysis/no paresthesias/no generalized seizures/no headache/no difficulty walking/no weakness/no syncope/no tremors/no focal seizures

## 2019-12-12 NOTE — PHYSICAL EXAM
[General Appearance - Well Developed] : well developed [Normal Appearance] : normal appearance [Well Groomed] : well groomed [No Deformities] : no deformities [General Appearance - Well Nourished] : well nourished [Normal Conjunctiva] : the conjunctiva exhibited no abnormalities [Eyelids - No Xanthelasma] : the eyelids demonstrated no xanthelasmas [General Appearance - In No Acute Distress] : no acute distress [No Oral Pallor] : no oral pallor [Normal Oral Mucosa] : normal oral mucosa [Normal Jugular Venous A Waves Present] : normal jugular venous A waves present [No Oral Cyanosis] : no oral cyanosis [Normal Jugular Venous V Waves Present] : normal jugular venous V waves present [No Jugular Venous Barnes A Waves] : no jugular venous barnes A waves [Respiration, Rhythm And Depth] : normal respiratory rhythm and effort [Auscultation Breath Sounds / Voice Sounds] : lungs were clear to auscultation bilaterally [Exaggerated Use Of Accessory Muscles For Inspiration] : no accessory muscle use [Heart Rate And Rhythm] : heart rate and rhythm were normal [Heart Sounds] : normal S1 and S2 [Murmurs] : no murmurs present [Abdomen Soft] : soft [Abdomen Tenderness] : non-tender [Abdomen Mass (___ Cm)] : no abdominal mass palpated [Abnormal Walk] : normal gait [Gait - Sufficient For Exercise Testing] : the gait was sufficient for exercise testing [Nail Clubbing] : no clubbing of the fingernails [Cyanosis, Localized] : no localized cyanosis [Petechial Hemorrhages (___cm)] : no petechial hemorrhages [Skin Color & Pigmentation] : normal skin color and pigmentation [] : no rash [No Venous Stasis] : no venous stasis [Skin Lesions] : no skin lesions [No Xanthoma] : no  xanthoma was observed [No Skin Ulcers] : no skin ulcer [Oriented To Time, Place, And Person] : oriented to person, place, and time [Mood] : the mood was normal [Affect] : the affect was normal [No Anxiety] : not feeling anxious

## 2019-12-12 NOTE — H&P PST ADULT - NSICDXPASTMEDICALHX_GEN_ALL_CORE_FT
PAST MEDICAL HISTORY:  CAD (coronary artery disease)     Fatty liver     HLD (hyperlipidemia)     Hypertension     Lead-induced chronic gout, unspecified hip, with tophus (tophi)     Renal stones PAST MEDICAL HISTORY:  CAD (coronary artery disease)     Chronic gout, unspecified, with tophus (tophi)     Fatty liver     HLD (hyperlipidemia)     Hypertension     Renal stones

## 2019-12-12 NOTE — H&P PST ADULT - MUSCULOSKELETAL
details… detailed exam normal strength/decreased ROM due to pain/no joint warmth/right knee and right great toe/no calf tenderness/no joint swelling/no joint erythema

## 2019-12-12 NOTE — H&P PST ADULT - NSANTHOSAYNRD_GEN_A_CORE
No. BG screening performed.  STOP BANG Legend: 0-2 = LOW Risk; 3-4 = INTERMEDIATE Risk; 5-8 = HIGH Risk

## 2019-12-12 NOTE — H&P PST ADULT - NSICDXPASTSURGICALHX_GEN_ALL_CORE_FT
PAST SURGICAL HISTORY:  CAD S/P percutaneous coronary angioplasty 2/2018 and 3/2018 (total 4 stents)

## 2019-12-12 NOTE — HISTORY OF PRESENT ILLNESS
[FreeTextEntry1] : Mr. Prado is a 63 year-old man with known hypertension, hyperlipidemia and CAD with recent cath demonstrating LAD/Diag disease thus had PCI, successfully. He is doing well. He is having a knee cyst removed on the 19th.

## 2019-12-12 NOTE — H&P PST ADULT - NEGATIVE OPHTHALMOLOGIC SYMPTOMS
no photophobia/no pain R/no loss of vision L/no blurred vision L/no diplopia/no loss of vision R/no blurred vision R/no pain L

## 2019-12-12 NOTE — H&P PST ADULT - NSICDXFAMILYHX_GEN_ALL_CORE_FT
FAMILY HISTORY:  Family history of acute myocardial infarction  Family history of cerebrovascular accident (CVA)    Sibling  Still living? Unknown  Family history of cardiac pacemaker, Age at diagnosis: Age Unknown

## 2019-12-17 PROBLEM — M1A.9XX1 CHRONIC GOUT, UNSPECIFIED, WITH TOPHUS (TOPHI): Chronic | Status: ACTIVE | Noted: 2019-12-12

## 2019-12-18 ENCOUNTER — TRANSCRIPTION ENCOUNTER (OUTPATIENT)
Age: 64
End: 2019-12-18

## 2019-12-18 NOTE — ASU PATIENT PROFILE, ADULT - PMH
CAD (coronary artery disease)    Chronic gout, unspecified, with tophus (tophi)    Fatty liver    HLD (hyperlipidemia)    Hypertension    Renal stones

## 2019-12-19 ENCOUNTER — RESULT REVIEW (OUTPATIENT)
Age: 64
End: 2019-12-19

## 2019-12-19 ENCOUNTER — OUTPATIENT (OUTPATIENT)
Dept: OUTPATIENT SERVICES | Facility: HOSPITAL | Age: 64
LOS: 1 days | Discharge: HOME | End: 2019-12-19
Payer: COMMERCIAL

## 2019-12-19 ENCOUNTER — APPOINTMENT (OUTPATIENT)
Dept: ORTHOPEDIC SURGERY | Facility: HOSPITAL | Age: 64
End: 2019-12-19

## 2019-12-19 VITALS
SYSTOLIC BLOOD PRESSURE: 147 MMHG | OXYGEN SATURATION: 99 % | RESPIRATION RATE: 16 BRPM | DIASTOLIC BLOOD PRESSURE: 85 MMHG | HEART RATE: 81 BPM

## 2019-12-19 VITALS
HEART RATE: 78 BPM | RESPIRATION RATE: 16 BRPM | HEIGHT: 70 IN | OXYGEN SATURATION: 96 % | DIASTOLIC BLOOD PRESSURE: 82 MMHG | SYSTOLIC BLOOD PRESSURE: 143 MMHG | TEMPERATURE: 98 F | WEIGHT: 220.02 LBS

## 2019-12-19 DIAGNOSIS — E78.00 PURE HYPERCHOLESTEROLEMIA, UNSPECIFIED: ICD-10-CM

## 2019-12-19 DIAGNOSIS — I25.10 ATHEROSCLEROTIC HEART DISEASE OF NATIVE CORONARY ARTERY WITHOUT ANGINA PECTORIS: Chronic | ICD-10-CM

## 2019-12-19 PROCEDURE — 28039 EXC FOOT/TOE TUM SC 1.5 CM/>: CPT | Mod: T5

## 2019-12-19 PROCEDURE — 27356 REMOVE FEMUR LESION/GRAFT: CPT | Mod: RT

## 2019-12-19 PROCEDURE — 88304 TISSUE EXAM BY PATHOLOGIST: CPT | Mod: 26

## 2019-12-19 RX ORDER — FENTANYL CITRATE 50 UG/ML
25 INJECTION INTRAVENOUS
Refills: 0 | Status: DISCONTINUED | OUTPATIENT
Start: 2019-12-19 | End: 2019-12-19

## 2019-12-19 RX ORDER — ACETAMINOPHEN 500 MG
1000 TABLET ORAL ONCE
Refills: 0 | Status: COMPLETED | OUTPATIENT
Start: 2019-12-19 | End: 2019-12-19

## 2019-12-19 RX ORDER — FENTANYL CITRATE 50 UG/ML
50 INJECTION INTRAVENOUS
Refills: 0 | Status: DISCONTINUED | OUTPATIENT
Start: 2019-12-19 | End: 2019-12-19

## 2019-12-19 RX ORDER — ONDANSETRON 8 MG/1
4 TABLET, FILM COATED ORAL ONCE
Refills: 0 | Status: DISCONTINUED | OUTPATIENT
Start: 2019-12-19 | End: 2020-01-03

## 2019-12-19 RX ADMIN — Medication 400 MILLIGRAM(S): at 19:40

## 2019-12-19 RX ADMIN — Medication 1000 MILLIGRAM(S): at 20:22

## 2019-12-19 RX ADMIN — FENTANYL CITRATE 25 MICROGRAM(S): 50 INJECTION INTRAVENOUS at 19:42

## 2019-12-19 RX ADMIN — FENTANYL CITRATE 25 MICROGRAM(S): 50 INJECTION INTRAVENOUS at 19:21

## 2019-12-19 NOTE — ASU DISCHARGE PLAN (ADULT/PEDIATRIC) - ASU DC SPECIAL INSTRUCTIONSFT
Follow up with Dr Partida in 2 weeks. Call office for appointment. Take medications as prescribed. Keep dressing clean, dry, and intact. Change gauze if becomes saturated. May remove bulky dressing after 1 week. Rest, ice, and elevate affected extremity. Weight bear as tolerated right lower extremity, but wear knee immobilizer at all times. Ambulate with crutches. Follow up with Dr Partida in 2 weeks. Call office for appointment. Take medications as prescribed. Keep dressing clean, dry, and intact. Change gauze if becomes saturated. May remove bulky dressing after 1 week. Rest, ice, and elevate affected extremity. Weight bear as tolerated right lower extremity, but wear knee immobilizer at all times. Ambulate with crutches. Take ibuprofen and/or tylenol as needed for pain. If take percocet for severe pain, do NOT take with tylenol.

## 2019-12-19 NOTE — ASU DISCHARGE PLAN (ADULT/PEDIATRIC) - NURSING INSTRUCTIONS
DO NOT take any Tylenol (Acetaminophen) or narcotics containing Tylenol until after  1:30am, . You received Tylenol during your operation and it can cause damage to your liver if too much is taken within a 24 hour time period.

## 2019-12-19 NOTE — ASU PREOP CHECKLIST - IDENTIFICATION BAND VERIFIED
----- Message from Nury Padilla sent at 11/14/2019  8:44 AM CST -----  Contact: Mom-- Brenda [phone]  Type:  Sooner Apoointment Request    Caller is requesting a sooner appointment.  Caller declined first available appointment listed below.  Caller will not accept being placed on the waitlist and is requesting a message be sent to doctor.    Name of Caller: Mom    When is the first available appointment? 3/2    Symptoms: swollen optic nerve    Would the patient rather a call back or a response via [facility]? Call    Best Call Back Number: [phone]    Additional Information:  Mom called to schedule pt an appt for as soon as possible. Mom states pt was seen in opthalmology, and they determined the pt needs to be seen in neurology. Mom is requesting a call back.   
Spoke to mother; appt 11/15/19. Patient with swollen optic nerve; emergent appt per opthalmologist.  
done

## 2019-12-23 LAB — SURGICAL PATHOLOGY STUDY: SIGNIFICANT CHANGE UP

## 2019-12-31 ENCOUNTER — APPOINTMENT (OUTPATIENT)
Dept: ORTHOPEDIC SURGERY | Facility: CLINIC | Age: 64
End: 2019-12-31
Payer: COMMERCIAL

## 2019-12-31 PROCEDURE — 99024 POSTOP FOLLOW-UP VISIT: CPT

## 2019-12-31 NOTE — HISTORY OF PRESENT ILLNESS
[Procedure: ___] : status post [unfilled] [___ Days Post Op] : post op day #[unfilled] [de-identified] : Right Knee [de-identified] : Patient was seen today in followup  ten days post exploration resection soft tissue mass eroding the right kneecap and at the same time resection of a soft tissue mass overriding the great toe both confirmed to be tophaceous gout [de-identified] : In examination today the surgical wound healing nice stitches were removed patient having no significant complaints patotid confirmed tophaceous gout. At this stage patient was recommended to be followed and to be seen again in

## 2020-02-27 ENCOUNTER — RX RENEWAL (OUTPATIENT)
Age: 65
End: 2020-02-27

## 2020-03-03 ENCOUNTER — APPOINTMENT (OUTPATIENT)
Dept: CARDIOLOGY | Facility: CLINIC | Age: 65
End: 2020-03-03
Payer: COMMERCIAL

## 2020-03-03 ENCOUNTER — APPOINTMENT (OUTPATIENT)
Dept: ORTHOPEDIC SURGERY | Facility: CLINIC | Age: 65
End: 2020-03-03
Payer: COMMERCIAL

## 2020-03-03 VITALS
HEART RATE: 70 BPM | BODY MASS INDEX: 31.07 KG/M2 | SYSTOLIC BLOOD PRESSURE: 147 MMHG | OXYGEN SATURATION: 98 % | HEIGHT: 70 IN | WEIGHT: 217 LBS | DIASTOLIC BLOOD PRESSURE: 84 MMHG

## 2020-03-03 DIAGNOSIS — M1A.9XX1 CHRONIC GOUT, UNSPECIFIED, WITH TOPHUS (TOPHI): ICD-10-CM

## 2020-03-03 PROCEDURE — 99214 OFFICE O/P EST MOD 30 MIN: CPT

## 2020-03-03 PROCEDURE — 73562 X-RAY EXAM OF KNEE 3: CPT | Mod: RT

## 2020-03-03 PROCEDURE — 99024 POSTOP FOLLOW-UP VISIT: CPT

## 2020-03-03 PROCEDURE — 93000 ELECTROCARDIOGRAM COMPLETE: CPT

## 2020-03-03 RX ORDER — ATORVASTATIN CALCIUM 40 MG/1
40 TABLET, FILM COATED ORAL
Qty: 90 | Refills: 3 | Status: ACTIVE | COMMUNITY
Start: 2018-03-22 | End: 1900-01-01

## 2020-03-03 RX ORDER — ASPIRIN 81 MG/1
81 TABLET, COATED ORAL
Qty: 90 | Refills: 3 | Status: ACTIVE | COMMUNITY
Start: 2020-02-27 | End: 1900-01-01
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
## 2020-03-03 NOTE — DISCUSSION/SUMMARY
[FreeTextEntry1] : Mr. Prado is a 64 year-old man with known CAD s/p PCI.\par \par Plan:\par 1. Given the PCI c/w aspirin and OMT. No further CV workup at this time. Patient may proceed with procedure at present remaining on all meds kenton-procedure. \par 2. Diet and exercise reiterated to improve overall outcomes and reduce future events.\par 3. Old records requested and reviewed with performing physician.\par 4. Primary and secondary prevention of cardiovascular and related conditions discussed at length, including but not limited to diet and lifestyle modification.\par 5. Patient to return to the office in 1-3 months.\par \par Thank you for allowing me to participate in the care of your patient. If you have any questions, please feel free to contact me at [phone] or via email at [email or web address].\par \par Sincerely,\par \par Jett Frey MD [facility]

## 2020-03-03 NOTE — HISTORY OF PRESENT ILLNESS
[FreeTextEntry1] : Patient was seen today in followup several months post wide resection of the fascia was got over the right knee And at the same time wide resection of topheceous gout from the great toe right foot both surgeries were without any significant complication gradually patient returned to a good level of activity

## 2020-03-03 NOTE — PHYSICAL EXAM
[FreeTextEntry1] : Physical exam reveals a healthy-looking patient in no apparent distress patient appear to be fully alert oriented and in mild tenderness over the right knee on exam patient regained good range of motion over the right knee no swelling no palpable mass no gross neurovascular deficit surgical wound over the great toe healed completely.\par X-ray of the right knee demonstrated good bone remodeling and incorporation of the bone graft over the kneecap at this stage patient was recommended to be followed and to be seen again in 9 months for followup

## 2020-03-03 NOTE — HISTORY OF PRESENT ILLNESS
[FreeTextEntry1] : Mr. Prado is a 64 year-old man with known hypertension, hyperlipidemia and CAD with recent cath demonstrating LAD/Diag disease thus had PCI, successfully. He is doing well. He is having a knee cyst removed on the 19th.

## 2020-08-18 ENCOUNTER — APPOINTMENT (OUTPATIENT)
Dept: CARDIOLOGY | Facility: CLINIC | Age: 65
End: 2020-08-18

## 2020-10-13 ENCOUNTER — APPOINTMENT (OUTPATIENT)
Dept: CARDIOLOGY | Facility: CLINIC | Age: 65
End: 2020-10-13
Payer: COMMERCIAL

## 2020-10-13 ENCOUNTER — NON-APPOINTMENT (OUTPATIENT)
Age: 65
End: 2020-10-13

## 2020-10-13 VITALS
SYSTOLIC BLOOD PRESSURE: 159 MMHG | BODY MASS INDEX: 31.21 KG/M2 | WEIGHT: 218 LBS | HEART RATE: 71 BPM | OXYGEN SATURATION: 96 % | HEIGHT: 70 IN | DIASTOLIC BLOOD PRESSURE: 82 MMHG

## 2020-10-13 PROCEDURE — 93000 ELECTROCARDIOGRAM COMPLETE: CPT

## 2020-10-13 PROCEDURE — 99214 OFFICE O/P EST MOD 30 MIN: CPT

## 2020-11-06 NOTE — HISTORY OF PRESENT ILLNESS
[FreeTextEntry1] : Mr. Prado is a 64 year-old man with known hypertension, hyperlipidemia and CAD with recent cath demonstrating LAD/Diag disease thus had PCI, successfully. He is doing well. He is having a knee cyst removed.

## 2020-11-06 NOTE — DISCUSSION/SUMMARY
[FreeTextEntry1] : Mr. Prado is a 64 year-old man with known CAD s/p PCI.\par \par Plan:\par 1. CAD: Given the PCI c/w aspirin and OMT. No further CV workup at this time. \par 2. HTN: amlodipine\par 3. HLD: atorvastatin\par 4. Diet and exercise reiterated to improve overall outcomes and reduce future events.\par 5. Old records requested and reviewed with performing physician.\par 6. Primary and secondary prevention of cardiovascular and related conditions discussed at length, including but not limited to diet and lifestyle modification.\par 7. Patient to return to the office in 1-3 months.\par \par Thank you for allowing me to participate in the care of your patient. If you have any questions, please feel free to contact me at [phone] or via email at [email or web address].\par \par Sincerely,\par \par Jett Frey MD [facility]

## 2020-12-03 ENCOUNTER — EMERGENCY (EMERGENCY)
Facility: HOSPITAL | Age: 65
LOS: 1 days | Discharge: HOME | End: 2020-12-03
Attending: EMERGENCY MEDICINE
Payer: COMMERCIAL

## 2020-12-03 VITALS
HEIGHT: 70 IN | SYSTOLIC BLOOD PRESSURE: 173 MMHG | HEART RATE: 78 BPM | RESPIRATION RATE: 18 BRPM | WEIGHT: 218.04 LBS | TEMPERATURE: 98 F | OXYGEN SATURATION: 97 % | DIASTOLIC BLOOD PRESSURE: 93 MMHG

## 2020-12-03 VITALS
HEART RATE: 67 BPM | OXYGEN SATURATION: 99 % | RESPIRATION RATE: 15 BRPM | SYSTOLIC BLOOD PRESSURE: 157 MMHG | DIASTOLIC BLOOD PRESSURE: 87 MMHG

## 2020-12-03 DIAGNOSIS — I25.10 ATHEROSCLEROTIC HEART DISEASE OF NATIVE CORONARY ARTERY WITHOUT ANGINA PECTORIS: Chronic | ICD-10-CM

## 2020-12-03 LAB
ALBUMIN SERPL ELPH-MCNC: 4.8 G/DL — SIGNIFICANT CHANGE UP (ref 3.3–5)
ALP SERPL-CCNC: 61 U/L — SIGNIFICANT CHANGE UP (ref 40–120)
ALT FLD-CCNC: 79 U/L — HIGH (ref 10–45)
ANION GAP SERPL CALC-SCNC: 15 MMOL/L — SIGNIFICANT CHANGE UP (ref 5–17)
APPEARANCE UR: CLEAR — SIGNIFICANT CHANGE UP
AST SERPL-CCNC: 39 U/L — SIGNIFICANT CHANGE UP (ref 10–40)
BASOPHILS # BLD AUTO: 0.06 K/UL — SIGNIFICANT CHANGE UP (ref 0–0.2)
BASOPHILS NFR BLD AUTO: 1.1 % — SIGNIFICANT CHANGE UP (ref 0–2)
BILIRUB SERPL-MCNC: 1.1 MG/DL — SIGNIFICANT CHANGE UP (ref 0.2–1.2)
BILIRUB UR-MCNC: NEGATIVE — SIGNIFICANT CHANGE UP
BUN SERPL-MCNC: 13 MG/DL — SIGNIFICANT CHANGE UP (ref 7–23)
CALCIUM SERPL-MCNC: 9.5 MG/DL — SIGNIFICANT CHANGE UP (ref 8.4–10.5)
CHLORIDE SERPL-SCNC: 101 MMOL/L — SIGNIFICANT CHANGE UP (ref 96–108)
CO2 SERPL-SCNC: 25 MMOL/L — SIGNIFICANT CHANGE UP (ref 22–31)
COLOR SPEC: SIGNIFICANT CHANGE UP
CREAT SERPL-MCNC: 0.99 MG/DL — SIGNIFICANT CHANGE UP (ref 0.5–1.3)
DIFF PNL FLD: NEGATIVE — SIGNIFICANT CHANGE UP
EOSINOPHIL # BLD AUTO: 0.21 K/UL — SIGNIFICANT CHANGE UP (ref 0–0.5)
EOSINOPHIL NFR BLD AUTO: 3.7 % — SIGNIFICANT CHANGE UP (ref 0–6)
GLUCOSE SERPL-MCNC: 113 MG/DL — HIGH (ref 70–99)
GLUCOSE UR QL: NEGATIVE — SIGNIFICANT CHANGE UP
HCT VFR BLD CALC: 42.6 % — SIGNIFICANT CHANGE UP (ref 39–50)
HGB BLD-MCNC: 14.5 G/DL — SIGNIFICANT CHANGE UP (ref 13–17)
IMM GRANULOCYTES NFR BLD AUTO: 0.7 % — SIGNIFICANT CHANGE UP (ref 0–1.5)
KETONES UR-MCNC: NEGATIVE — SIGNIFICANT CHANGE UP
LEUKOCYTE ESTERASE UR-ACNC: NEGATIVE — SIGNIFICANT CHANGE UP
LYMPHOCYTES # BLD AUTO: 1.26 K/UL — SIGNIFICANT CHANGE UP (ref 1–3.3)
LYMPHOCYTES # BLD AUTO: 22.2 % — SIGNIFICANT CHANGE UP (ref 13–44)
MCHC RBC-ENTMCNC: 30.8 PG — SIGNIFICANT CHANGE UP (ref 27–34)
MCHC RBC-ENTMCNC: 34 GM/DL — SIGNIFICANT CHANGE UP (ref 32–36)
MCV RBC AUTO: 90.4 FL — SIGNIFICANT CHANGE UP (ref 80–100)
MONOCYTES # BLD AUTO: 0.39 K/UL — SIGNIFICANT CHANGE UP (ref 0–0.9)
MONOCYTES NFR BLD AUTO: 6.9 % — SIGNIFICANT CHANGE UP (ref 2–14)
NEUTROPHILS # BLD AUTO: 3.71 K/UL — SIGNIFICANT CHANGE UP (ref 1.8–7.4)
NEUTROPHILS NFR BLD AUTO: 65.4 % — SIGNIFICANT CHANGE UP (ref 43–77)
NITRITE UR-MCNC: NEGATIVE — SIGNIFICANT CHANGE UP
NRBC # BLD: 0 /100 WBCS — SIGNIFICANT CHANGE UP (ref 0–0)
PH UR: 6.5 — SIGNIFICANT CHANGE UP (ref 5–8)
PLATELET # BLD AUTO: 195 K/UL — SIGNIFICANT CHANGE UP (ref 150–400)
POTASSIUM SERPL-MCNC: 4.2 MMOL/L — SIGNIFICANT CHANGE UP (ref 3.5–5.3)
POTASSIUM SERPL-SCNC: 4.2 MMOL/L — SIGNIFICANT CHANGE UP (ref 3.5–5.3)
PROT SERPL-MCNC: 7.1 G/DL — SIGNIFICANT CHANGE UP (ref 6–8.3)
PROT UR-MCNC: NEGATIVE — SIGNIFICANT CHANGE UP
RBC # BLD: 4.71 M/UL — SIGNIFICANT CHANGE UP (ref 4.2–5.8)
RBC # FLD: 12.3 % — SIGNIFICANT CHANGE UP (ref 10.3–14.5)
SODIUM SERPL-SCNC: 141 MMOL/L — SIGNIFICANT CHANGE UP (ref 135–145)
SP GR SPEC: 1.01 — SIGNIFICANT CHANGE UP (ref 1.01–1.02)
TROPONIN T, HIGH SENSITIVITY RESULT: 6 NG/L — SIGNIFICANT CHANGE UP (ref 0–51)
TSH SERPL-MCNC: 0.82 UIU/ML — SIGNIFICANT CHANGE UP (ref 0.27–4.2)
UROBILINOGEN FLD QL: NEGATIVE — SIGNIFICANT CHANGE UP
WBC # BLD: 5.67 K/UL — SIGNIFICANT CHANGE UP (ref 3.8–10.5)
WBC # FLD AUTO: 5.67 K/UL — SIGNIFICANT CHANGE UP (ref 3.8–10.5)

## 2020-12-03 PROCEDURE — 93010 ELECTROCARDIOGRAM REPORT: CPT

## 2020-12-03 PROCEDURE — 83735 ASSAY OF MAGNESIUM: CPT

## 2020-12-03 PROCEDURE — 87086 URINE CULTURE/COLONY COUNT: CPT

## 2020-12-03 PROCEDURE — 99285 EMERGENCY DEPT VISIT HI MDM: CPT

## 2020-12-03 PROCEDURE — 93005 ELECTROCARDIOGRAM TRACING: CPT

## 2020-12-03 PROCEDURE — 84100 ASSAY OF PHOSPHORUS: CPT

## 2020-12-03 PROCEDURE — 80053 COMPREHEN METABOLIC PANEL: CPT

## 2020-12-03 PROCEDURE — 85025 COMPLETE CBC W/AUTO DIFF WBC: CPT

## 2020-12-03 PROCEDURE — 84443 ASSAY THYROID STIM HORMONE: CPT

## 2020-12-03 PROCEDURE — 84484 ASSAY OF TROPONIN QUANT: CPT

## 2020-12-03 PROCEDURE — 36415 COLL VENOUS BLD VENIPUNCTURE: CPT

## 2020-12-03 PROCEDURE — 99284 EMERGENCY DEPT VISIT MOD MDM: CPT | Mod: 25

## 2020-12-03 PROCEDURE — 81003 URINALYSIS AUTO W/O SCOPE: CPT

## 2020-12-03 RX ORDER — METOPROLOL TARTRATE 50 MG
1 TABLET ORAL
Qty: 14 | Refills: 0
Start: 2020-12-03 | End: 2020-12-09

## 2020-12-03 RX ORDER — METOPROLOL TARTRATE 50 MG
25 TABLET ORAL ONCE
Refills: 0 | Status: COMPLETED | OUTPATIENT
Start: 2020-12-03 | End: 2020-12-03

## 2020-12-03 RX ADMIN — Medication 25 MILLIGRAM(S): at 14:53

## 2020-12-03 NOTE — ED PROVIDER NOTE - ATTENDING CONTRIBUTION TO CARE
Pt with intermittent palpitations along with flushing last night, feels similar to when pt had vtach during stress test per patient.  Exam: well appearing, nontoxic, RRR with PVCs noted on monitor (every third beat at times serially for 30 seconds then improves).

## 2020-12-03 NOTE — ED PROVIDER NOTE - PATIENT PORTAL LINK FT
You can access the FollowMyHealth Patient Portal offered by [facility] by registering at the following website: [email or web address]. By joining [facility]’s FollowMyHealth portal, you will also be able to view your health information using other applications (apps) compatible with our system.

## 2020-12-03 NOTE — ED PROVIDER NOTE - NSFOLLOWUPINSTRUCTIONS_ED_ALL_ED_FT
You were seen in the ER today for your palpitations. No acute abnormalities we seen on blood work or chest x-ray. The cardiology doctors saw you and want to follow up with your cardiologist in the next 1-3 days. A prescription for metoprolol was sent to your local pharmacy. Take as directed.    If you experience any of the following please come right back to the emergency room: chest pain that becomes much worse with walking up stairs or exercising, uncontrollable nausea and vomiting, severe chest pain that will not go away, passing out, new persistent numbness and/or weakness.

## 2020-12-03 NOTE — ED ADULT NURSE NOTE - OBJECTIVE STATEMENT
64y Male with PMH of HTN presents to the ED c/o palpitations. Pt states the palpitations started last night around 11pm, described it as his "heart skipping", also states he had frequent urination last night. Pt states he felt nauseous and "tingling" throughout his body throughout the night but is not currently experiencing that now. S1 and S2 heard, cardiac monitor showed NSR with PVCs. No chest pain, no back pain, no vomiting, no SOB, normal BMs. Pt on cardiac monitor, Heplock in place, labs drawn and sent. Pt seen and evaluated by MD.

## 2020-12-03 NOTE — ED PROVIDER NOTE - OBJECTIVE STATEMENT
64M hx CAD s/p stent x4, hld, htn, p/w palpitations last night. During episode felt flushing, dizziness, heart racing. Describes skipped beats during episode, dizziness as diffuse flushing through body w/ associated tingling, 64M hx CAD s/p stent x4, hld, htn, p/w palpitations last night. During episode felt flushing, dizziness, heart racing. Describes skipped beats during episode, dizziness as diffuse flushing through body w/ associated tingling. Episodes last seconds, reports multiple throughout night & one this morning. No known hx of irregular heartbeat. No syncopal episodes or associated CP. 64M hx CAD s/p stent x4, hld, htn, p/w palpitations last night. During episode felt flushing and heart racing. Describes feeling skipped beats during episode and diffuse flushing through body w/ associated tingling and nausea. Episodes last seconds, reports multiple throughout night & one this morning. No known hx of irregular heartbeat. No syncopal episodes or associated CP. Patient does report increased urination overnight but denies dysuria. Patient denies weakness, fevers/chills, SOB, cough, vomiting or diarrhea.

## 2020-12-03 NOTE — CONSULT NOTE ADULT - SUBJECTIVE AND OBJECTIVE BOX
Reviewed medical record and testing including cath 2018  with PCI to LCx and RCA as well as ECHO 2019 which was normal. No life-threatening arrythmia and noted that he is not on any beta-bloackers and would start him on metoprolol tartrate 25 mg BID or if discharged prior to evaluation 25 mg metoprolol succinate daily with follow up. Magdi be assessed ~3:15.     Gaby Griffin MD, MPH, KAYLAN, RPVI, [facility]  Cardiovascular Specialist   [facility]  c: [phone] (text preferred and/or call)  e: [email or web address]  For all [facility] Cardiology and Cardiovascular Surgery on-service contact information, go to [email or web address] and use "[employer]" to login.

## 2020-12-03 NOTE — ED ADULT NURSE NOTE - VOIDING
Addended by: HILTON ALVARADO on: 5/6/2019 08:25 AM     Modules accepted: Orders    
frequency/without difficulty

## 2020-12-03 NOTE — ED ADULT TRIAGE NOTE - NS ED TRIAGE AVPU SCALE
01-Mar-2019 14:11
Alert-The patient is alert, awake and responds to voice. The patient is oriented to time, place, and person. The triage nurse is able to obtain subjective information.

## 2020-12-03 NOTE — ED PROVIDER NOTE - PHYSICAL EXAMINATION
Gen: WDWN, NAD. Irregular rhythm on monitor (PVCs)   HEENT: EOMI, no nasal discharge, mucous membranes moist  CV: Regular rate with irregular rhythm, +S1/S2, no M/R/G  Resp: CTAB, no W/R/R  GI: Abdomen soft non-distended, NTTP, no masses  MSK: No open wounds, no bruising, no LE edema  Neuro: A&Ox4, following commands, moving all four extremities spontaneously

## 2020-12-03 NOTE — ED ADULT NURSE NOTE - CHPI ED NUR SYMPTOMS NEG
no diaphoresis/no dizziness/no shortness of breath/no vomiting/no congestion/no fever/no nausea/no syncope/no back pain/no chest pain/no chills

## 2020-12-03 NOTE — ED PROVIDER NOTE - CLINICAL SUMMARY MEDICAL DECISION MAKING FREE TEXT BOX
64M hx CAD s/p stent x4, hld, htn, p/w palpitations last night with associated diffuse body tingling and nausea with irregular heart rhythm on exam. Repeat EKG and troponin given patient's previous history of CAD and risk factors. Possible electrolyte abnormality vs. hyperthyroidism; CBC, CMP, TSH, UA

## 2020-12-03 NOTE — ED PROVIDER NOTE - NS ED ROS FT
Gen: Denies fevers/chills   CV: Denies chest pain  Skin: Denies rash, erythema, color changes  Resp: Denies SOB, cough  Endo: Denies sensitivity to heat, cold  GI: Denies diarrhea, constipation, vomiting  Msk: Denies back pain, LE swelling, extremity pain  : Denies dysuria  Neuro: Denies LOC, weakness

## 2020-12-03 NOTE — ED PROVIDER NOTE - CARE PLAN
Principal Discharge DX:	Palpitations  Secondary Diagnosis:	PVC (premature ventricular contraction)

## 2020-12-04 LAB
CULTURE RESULTS: NO GROWTH — SIGNIFICANT CHANGE UP
SPECIMEN SOURCE: SIGNIFICANT CHANGE UP

## 2020-12-08 ENCOUNTER — APPOINTMENT (OUTPATIENT)
Dept: CARDIOLOGY | Facility: CLINIC | Age: 65
End: 2020-12-08
Payer: COMMERCIAL

## 2020-12-08 VITALS — DIASTOLIC BLOOD PRESSURE: 81 MMHG | OXYGEN SATURATION: 97 % | SYSTOLIC BLOOD PRESSURE: 152 MMHG | HEART RATE: 64 BPM

## 2020-12-08 DIAGNOSIS — E11.9 TYPE 2 DIABETES MELLITUS W/OUT COMPLICATIONS: ICD-10-CM

## 2020-12-08 PROCEDURE — 99214 OFFICE O/P EST MOD 30 MIN: CPT

## 2020-12-08 PROCEDURE — 99072 ADDL SUPL MATRL&STAF TM PHE: CPT

## 2020-12-08 NOTE — HISTORY OF PRESENT ILLNESS
[FreeTextEntry1] : Mr. Prado is a 64 year-old man with known hypertension, hyperlipidemia and CAD with recent cath demonstrating LAD/Diag disease thus had PCI, successfully. He is doing well. He is having a knee cyst removed. He was recently hospitalized for this vague ascending weakness and heat sensation, no syncope and increase in frequency of urination.

## 2020-12-08 NOTE — DISCUSSION/SUMMARY
[FreeTextEntry1] : Mr. Prado is a 64 year-old man with known CAD s/p PCI.\par \par Plan:\par 1. CAD: Given the PCI c/w aspirin and OMT. \par 2. HTN: amlodipine\par 3. HLD: atorvastatin\par 4. Palps: given the symptoms, may all be related to new DM, however will ensure there is no pathologic palps with an event monitor and TTE\par 5. Diet and exercise reiterated to improve overall outcomes and reduce future events.\par 6. Old records requested and reviewed with performing physician.\par 7. Primary and secondary prevention of cardiovascular and related conditions discussed at length, including but not limited to diet and lifestyle modification.\par 8. Patient to return to the office in 1-3 months.\par \par Thank you for allowing me to participate in the care of your patient. If you have any questions, please feel free to contact me at [phone] or via email at [email or web address].\par \par Sincerely,\par \par Jett Frey MD [facility]

## 2020-12-21 ENCOUNTER — TRANSCRIPTION ENCOUNTER (OUTPATIENT)
Age: 65
End: 2020-12-21

## 2020-12-21 ENCOUNTER — EMERGENCY (EMERGENCY)
Facility: HOSPITAL | Age: 65
LOS: 1 days | Discharge: HOME | End: 2020-12-21
Attending: EMERGENCY MEDICINE | Admitting: EMERGENCY MEDICINE
Payer: COMMERCIAL

## 2020-12-21 VITALS
SYSTOLIC BLOOD PRESSURE: 145 MMHG | DIASTOLIC BLOOD PRESSURE: 76 MMHG | HEART RATE: 74 BPM | OXYGEN SATURATION: 98 % | TEMPERATURE: 98 F | RESPIRATION RATE: 16 BRPM

## 2020-12-21 VITALS
WEIGHT: 218.04 LBS | SYSTOLIC BLOOD PRESSURE: 132 MMHG | RESPIRATION RATE: 18 BRPM | DIASTOLIC BLOOD PRESSURE: 70 MMHG | HEIGHT: 70 IN | OXYGEN SATURATION: 98 % | TEMPERATURE: 98 F | HEART RATE: 78 BPM

## 2020-12-21 DIAGNOSIS — I25.10 ATHEROSCLEROTIC HEART DISEASE OF NATIVE CORONARY ARTERY WITHOUT ANGINA PECTORIS: Chronic | ICD-10-CM

## 2020-12-21 LAB
ALBUMIN SERPL ELPH-MCNC: 4 G/DL — SIGNIFICANT CHANGE UP (ref 3.3–5)
ALP SERPL-CCNC: 63 U/L — SIGNIFICANT CHANGE UP (ref 40–120)
ALT FLD-CCNC: 68 U/L — SIGNIFICANT CHANGE UP (ref 12–78)
ANION GAP SERPL CALC-SCNC: 7 MMOL/L — SIGNIFICANT CHANGE UP (ref 5–17)
APTT BLD: 30.1 SEC — SIGNIFICANT CHANGE UP (ref 27.5–35.5)
AST SERPL-CCNC: 27 U/L — SIGNIFICANT CHANGE UP (ref 15–37)
BASOPHILS # BLD AUTO: 0.05 K/UL — SIGNIFICANT CHANGE UP (ref 0–0.2)
BASOPHILS NFR BLD AUTO: 0.9 % — SIGNIFICANT CHANGE UP (ref 0–2)
BILIRUB SERPL-MCNC: 1 MG/DL — SIGNIFICANT CHANGE UP (ref 0.2–1.2)
BUN SERPL-MCNC: 19 MG/DL — SIGNIFICANT CHANGE UP (ref 7–23)
CALCIUM SERPL-MCNC: 9 MG/DL — SIGNIFICANT CHANGE UP (ref 8.5–10.1)
CHLORIDE SERPL-SCNC: 108 MMOL/L — SIGNIFICANT CHANGE UP (ref 96–108)
CK MB CFR SERPL CALC: <1 NG/ML — SIGNIFICANT CHANGE UP (ref 0–3.6)
CO2 SERPL-SCNC: 25 MMOL/L — SIGNIFICANT CHANGE UP (ref 22–31)
CREAT SERPL-MCNC: 1.1 MG/DL — SIGNIFICANT CHANGE UP (ref 0.5–1.3)
EOSINOPHIL # BLD AUTO: 0.17 K/UL — SIGNIFICANT CHANGE UP (ref 0–0.5)
EOSINOPHIL NFR BLD AUTO: 3.2 % — SIGNIFICANT CHANGE UP (ref 0–6)
GLUCOSE SERPL-MCNC: 111 MG/DL — HIGH (ref 70–99)
HCT VFR BLD CALC: 41 % — SIGNIFICANT CHANGE UP (ref 39–50)
HGB BLD-MCNC: 14.1 G/DL — SIGNIFICANT CHANGE UP (ref 13–17)
IMM GRANULOCYTES NFR BLD AUTO: 0.4 % — SIGNIFICANT CHANGE UP (ref 0–1.5)
INR BLD: 1.02 RATIO — SIGNIFICANT CHANGE UP (ref 0.88–1.16)
LYMPHOCYTES # BLD AUTO: 1.08 K/UL — SIGNIFICANT CHANGE UP (ref 1–3.3)
LYMPHOCYTES # BLD AUTO: 20.1 % — SIGNIFICANT CHANGE UP (ref 13–44)
MAGNESIUM SERPL-MCNC: 2.3 MG/DL — SIGNIFICANT CHANGE UP (ref 1.6–2.6)
MCHC RBC-ENTMCNC: 30.5 PG — SIGNIFICANT CHANGE UP (ref 27–34)
MCHC RBC-ENTMCNC: 34.4 GM/DL — SIGNIFICANT CHANGE UP (ref 32–36)
MCV RBC AUTO: 88.7 FL — SIGNIFICANT CHANGE UP (ref 80–100)
MONOCYTES # BLD AUTO: 0.42 K/UL — SIGNIFICANT CHANGE UP (ref 0–0.9)
MONOCYTES NFR BLD AUTO: 7.8 % — SIGNIFICANT CHANGE UP (ref 2–14)
NEUTROPHILS # BLD AUTO: 3.63 K/UL — SIGNIFICANT CHANGE UP (ref 1.8–7.4)
NEUTROPHILS NFR BLD AUTO: 67.6 % — SIGNIFICANT CHANGE UP (ref 43–77)
NRBC # BLD: 0 /100 WBCS — SIGNIFICANT CHANGE UP (ref 0–0)
NT-PROBNP SERPL-SCNC: 17 PG/ML — SIGNIFICANT CHANGE UP (ref 0–125)
PLATELET # BLD AUTO: 212 K/UL — SIGNIFICANT CHANGE UP (ref 150–400)
POTASSIUM SERPL-MCNC: 4.2 MMOL/L — SIGNIFICANT CHANGE UP (ref 3.5–5.3)
POTASSIUM SERPL-SCNC: 4.2 MMOL/L — SIGNIFICANT CHANGE UP (ref 3.5–5.3)
PROT SERPL-MCNC: 7.3 G/DL — SIGNIFICANT CHANGE UP (ref 6–8.3)
PROTHROM AB SERPL-ACNC: 11.9 SEC — SIGNIFICANT CHANGE UP (ref 10.6–13.6)
RBC # BLD: 4.62 M/UL — SIGNIFICANT CHANGE UP (ref 4.2–5.8)
RBC # FLD: 12.8 % — SIGNIFICANT CHANGE UP (ref 10.3–14.5)
SARS-COV-2 RNA SPEC QL NAA+PROBE: SIGNIFICANT CHANGE UP
SODIUM SERPL-SCNC: 140 MMOL/L — SIGNIFICANT CHANGE UP (ref 135–145)
TROPONIN I SERPL-MCNC: <.015 NG/ML — SIGNIFICANT CHANGE UP (ref 0.01–0.04)
TROPONIN I SERPL-MCNC: <.015 NG/ML — SIGNIFICANT CHANGE UP (ref 0.01–0.04)
TSH SERPL-MCNC: 0.82 UIU/ML — SIGNIFICANT CHANGE UP (ref 0.36–3.74)
WBC # BLD: 5.37 K/UL — SIGNIFICANT CHANGE UP (ref 3.8–10.5)
WBC # FLD AUTO: 5.37 K/UL — SIGNIFICANT CHANGE UP (ref 3.8–10.5)

## 2020-12-21 PROCEDURE — 84443 ASSAY THYROID STIM HORMONE: CPT

## 2020-12-21 PROCEDURE — 93010 ELECTROCARDIOGRAM REPORT: CPT

## 2020-12-21 PROCEDURE — U0003: CPT

## 2020-12-21 PROCEDURE — 85730 THROMBOPLASTIN TIME PARTIAL: CPT

## 2020-12-21 PROCEDURE — 85610 PROTHROMBIN TIME: CPT

## 2020-12-21 PROCEDURE — 80053 COMPREHEN METABOLIC PANEL: CPT

## 2020-12-21 PROCEDURE — 99285 EMERGENCY DEPT VISIT HI MDM: CPT

## 2020-12-21 PROCEDURE — 84484 ASSAY OF TROPONIN QUANT: CPT

## 2020-12-21 PROCEDURE — 85025 COMPLETE CBC W/AUTO DIFF WBC: CPT

## 2020-12-21 PROCEDURE — 71045 X-RAY EXAM CHEST 1 VIEW: CPT | Mod: 26

## 2020-12-21 PROCEDURE — 99284 EMERGENCY DEPT VISIT MOD MDM: CPT | Mod: 25

## 2020-12-21 PROCEDURE — 82553 CREATINE MB FRACTION: CPT

## 2020-12-21 PROCEDURE — 71045 X-RAY EXAM CHEST 1 VIEW: CPT

## 2020-12-21 PROCEDURE — 99284 EMERGENCY DEPT VISIT MOD MDM: CPT

## 2020-12-21 PROCEDURE — 83880 ASSAY OF NATRIURETIC PEPTIDE: CPT

## 2020-12-21 PROCEDURE — 83735 ASSAY OF MAGNESIUM: CPT

## 2020-12-21 PROCEDURE — 93005 ELECTROCARDIOGRAM TRACING: CPT

## 2020-12-21 PROCEDURE — 36415 COLL VENOUS BLD VENIPUNCTURE: CPT

## 2020-12-21 RX ORDER — SODIUM CHLORIDE 9 MG/ML
1000 INJECTION INTRAMUSCULAR; INTRAVENOUS; SUBCUTANEOUS ONCE
Refills: 0 | Status: DISCONTINUED | OUTPATIENT
Start: 2020-12-21 | End: 2020-12-24

## 2020-12-21 NOTE — ED ADULT NURSE REASSESSMENT NOTE - NS ED NURSE REASSESS COMMENT FT1
Second cardiac enzyme collected and sent to the lab at this time. Awaiting results and plan of care. Safety maintained.

## 2020-12-21 NOTE — ED ADULT NURSE NOTE - NSIMPLEMENTINTERV_GEN_ALL_ED
Implemented All Universal Safety Interventions:  [place] to call system. Call bell, personal items and telephone within reach. Instruct patient to call for assistance. Room bathroom lighting operational. Non-slip footwear when patient is off stretcher. Physically safe environment: no spills, clutter or unnecessary equipment. Stretcher in lowest position, wheels locked, appropriate side rails in place.

## 2020-12-21 NOTE — CONSULT NOTE ADULT - ASSESSMENT
Patient is 64 yo male with PMhx of CAD s/p 2 stents 2018 (LAD, RCA), HTN, HLD, gout presents to ED with chest pain. Cardiology consulted for chest pain, trigeminy.     Arrhythmia   - Patient is not complaining of any cardiac symptoms at this time  - Continue home lisinopril, amlodipine, atorvastatin 81mg aspirin   - BP well controlled, monitor routine hemodynamics.  - Monitor and replete lytes, keep K>4, Mg>2.    Fluids  - Patient appears overloaded, +1 edema b/l LE  - CXR negative   - Continue diuresis   - Strict I/Os, daily weights.    Ischemia   - No clear evidence of acute ischemia, trops negative  - No acute changes on EKG compared to previous.        - Other cardiovascular workup will depend on clinical course.  - All other workup per primary team.  - Will continue to follow.             Patient is 64 yo male with PMhx of CAD s/p 2 stents 2018 (LAD, RCA), HTN, HLD, gout presents to ED with chest pain. Cardiology consulted for chest pain, trigeminy.     Arrhythmia   - Patient is not complaining of any cardiac symptoms at this time  - Continue home amlodipine, atorvastatin, 81mg aspirin   - BP well controlled, monitor routine hemodynamics.  - Monitor and replete lytes, keep K>4, Mg>2.    Fluids  - Patient appears overloaded, +1 edema b/l LE  - CXR negative   - TTE 3/19: EF 59%, LV sys function normal  - Scheduled for echo outpatient 12/23  - Continue home lisinopril  - Strict I/Os, daily weights.    Ischemia   - No clear evidence of acute ischemia, trops negative  - No acute changes on EKG compared to previous.    - Other cardiovascular workup will depend on clinical course.  - All other workup per primary team.  - Will continue to follow.             Patient is 64 yo male with PMhx of CAD s/p 2 stents 2018 (LAD, RCA), HTN, HLD, gout presents to ED with chest pain. Cardiology consulted for chest pain, trigeminy.     Arrhythmia   - Patient likely had a vaso vagal episode after standing to go to bathroom, symptoms quickly resolved  - PVC and vagal episode likely 2/2 dehydration from poor PO intake  - Patient is not complaining of any cardiac symptoms at this time  - Continue home amlodipine, atorvastatin, 81mg aspirin   - BP well controlled, monitor routine hemodynamics.  - Monitor and replete lytes, keep K>4, Mg>2.    Fluids  - No signs of volume overload  - CXR negative   - TTE 3/19: EF 59%, LV sys function normal  - Scheduled for echo outpatient 12/23  - Continue home lisinopril  - Strict I/Os, daily weights.    Ischemia   - No clear evidence of acute ischemia, trops negative   - Will repeat second set of trops  - No acute changes on EKG compared to previous.    - Outpatient cardiologist notified Dr. Lafleur  - Other cardiovascular workup will depend on clinical course.  - All other workup per primary team.  - Will continue to follow.             Patient is 66 yo male with PMhx of CAD s/p 2 stents 2018 (LAD, RCA), HTN, HLD, gout presents to ED with chest pain. Cardiology consulted for chest pain, trigeminy.     - Patient likely had a vaso vagal episode after standing to go to bathroom, symptoms quickly resolved  - likely 2/2 dehydration from poor PO intake      - Patient is not complaining of any cardiac symptoms at this time  - Continue home amlodipine, atorvastatin, 81mg aspirin   - BP well controlled, monitor routine hemodynamics.  - Monitor and replete lytes, keep K>4, Mg>2.      - No signs of volume overload  - CXR negative   - TTE 3/19: EF 59%, LV sys function normal  - Scheduled for echo outpatient 12/23  - Continue home lisinopril  - Strict I/Os, daily weights.      - No clear evidence of acute ischemia, trops negative   - Will repeat second set of trops  - No acute changes on EKG compared to previous.    - Outpatient cardiologist notified Dr. Frey  - Other cardiovascular workup will depend on clinical course.  - All other workup per primary team.  - Will continue to follow.

## 2020-12-21 NOTE — ED PROVIDER NOTE - PROGRESS NOTE DETAILS
notified cardiology Dr. Miller, will consult seen by cardiology, 2nd set troponin negative, dc home, f/u as outpatient with Dr. Frey

## 2020-12-21 NOTE — ED PROVIDER NOTE - PATIENT PORTAL LINK FT
You can access the FollowMyHealth Patient Portal offered by [facility] by registering at the following website: [email or web address]. By joining [email or web address]’s FollowMyHealth portal, you will also be able to view your health information using other applications (apps) compatible with our system.

## 2020-12-21 NOTE — ED ADULT TRIAGE NOTE - MODE OF ARRIVAL
2020    Darryl Ash, [address] [facility] [address]    Patient: Dereje Oh   YOB: 1931   Date of Visit: 2019     Encounter Diagnosis     ICD-10-CM    1  Left hip pain M25 552    2  Left-sided low back pain without sciatica, unspecified chronicity M54 5        Dear Dr Steven Reid: Thank you for your recent referral of Dereje Oh  Please review the attached evaluation summary from Andre's recent visit  Please verify that you agree with the plan of care by signing the attached order  If you have any questions or concerns, please do not hesitate to call  I sincerely appreciate the opportunity to share in the care of one of your patients and hope to have another opportunity to work with you in the near future  Sincerely,    Verónica Oquendo PT      Referring Provider:      I certify that I have read the below Plan of Care and certify the need for these services furnished under this plan of treatment while under my care  Darryl Ash MD  [address]: [phone]          PT Discharge Summary    Today's date: 2019  Patient name: Dereje Oh  : 1931  MRN: [id number]  Referring provider: Teresa Solomon MD  Dx:   Encounter Diagnosis     ICD-10-CM    1  Left hip pain M25 552    2  Left-sided low back pain without sciatica, unspecified chronicity M54 5                Assessment  Assessment details: Pt is 82 y o  Male referred to physical therapy by Dr Wan Miller with diagnosis of left hip pain and low back pain without sciatica  Pt was seen for 5 visits of therapy with resolution of symptoms and no pain with ambulation  Pt has demonstrated improvements in L hip ROM and strength meeting nearly all goals   Pt feels he no longer requires therapy and will do his exercises at home and is requesting discharge to home program  Pt's goal of ambulation without hip pain has been met        Goals  Short term goals:  2-4 weeks  Patient will report pain level of 2-3/10 with functional activities- met  Patient will ambulate without hip pain in left hip functional distances- met  Patient will arise from bed and initiate ambulation without hip pain- met  Patient will gain ROM of left hip to functional flexion and rotation - met  Patient will increase strength to 4+/ in hip and knee musculature -met  Patient will increase core muscle strength to 3+/5- met  Long term goals:4-8 weeks  Patient will report pain level of 0-1/10 with functional activities and gait- met  Patient will demonstate independence in home exercise program  - met    Plan  D/C pt to [facility]        Subjective Evaluation    History of Present Illness  Date of onset: 2019  Mechanism of injury: Pt feels he is better and would like to be discharged to [facility]  Pain  Current pain ratin  At best pain ratin  At worst pain ratin  Quality: dull ache  Progression: resolved      Patient Goals  Patient goals for therapy: decreased pain, increased motion, increased strength and independence with ADLs/IADLs  Patient goal: to be painfree when I walk-met        Objective     Palpation     Additional Palpation Details  No tenderness to palpation of lumbar musculature, left hip, left bursa or ITB      Neurological Testing     Sensation     Hip   Left Hip   Intact: light touch    Right Hip   Intact: light touch    Active Range of Motion   Left Hip   Flexion: 110 degrees   Extension: 10 degrees   Abduction: WFL  Adduction: WFL  External rotation (90/90): 45 degrees   Internal rotation (90/90): 35 degrees     Strength/Myotome Testing     Left Hip   Planes of Motion   Flexion: 4+  Extension: 4+  Abduction: 4+  Adduction: 4+  External rotation: 4  Internal rotation: 4      Subjective: Pt states he feels good and his goals are met  Pt would like this to be his last visit and states he will do his exercises at home         Objective: See treatment diary below      Assessment: Tolerated treatment well  Patient exhibited good technique with therapeutic exercises  See above for discharge summary and assessment of goals        Plan: D/C Pt from PT to HEP     Precautions: a fib, Low heart rate        Exercise Diary  11/21 11/25 11/27 12/2 12/4   Visit # 1 2 3 4  5   Pain level 0/10 0/10 0/10 5/10 0/10   Add sets x15 2/10 [place] Hoang 3/10 Ball 3/10 3/10   HIP ABD supine X 15 L2   2/10 L3  3/10 L3 3/10 L3 3/10   SKTC x5 5x 10" home 2/10 2/10   SLR   -----> 2/10 2/10   bridges  -----> 10x 10x  x20   PPT  10x 20x 20x  x20   LTR c ppt   1/20 2/20 2/20                                                   LE Stretch  Ds 10m 10m ds 10m KW  15   JH           Modalities         Hp hip/ back seated 10 min 12m 12m 10m  15 min Ambulance EMS

## 2020-12-21 NOTE — ED PROVIDER NOTE - OBJECTIVE STATEMENT
65 male presents to ER by ambulance from home with report of chest pain, dizziness, trigeminy on cardiac monitor. Patient states he has been having dizziness, light headed intermitnant, worse in the morning when lying down, had seen his cardiologist Dr. Lafleur, on 12/8/20, placed on a monitor, no beta blockers, states this morning got up to go the bathroom and felt chest tightness, heart skipping beats and dizziness and wife called EMS. PAtient states he took 6 baby aspirin prior to coming to ER. Patient right now feeling well, symptoms resolved.

## 2020-12-21 NOTE — CONSULT NOTE ADULT - ATTENDING COMMENTS
I saw and examined the patient personally. Spoke with above provider regarding this case. I reviewed the above findings completely.  I agree with the above history, physical, and plan which I have edited where appropriate.     Pt with likely near syncopal episode in setting of increased vagal tone and dehydration. will need to encourage PO hydration. trend damian. spoke to dr zavaleta. pt did not melvin bb in past. will fu with him if damian neg.

## 2020-12-21 NOTE — CONSULT NOTE ADULT - SUBJECTIVE AND OBJECTIVE BOX
CHARTING IN PROGRESS  Patient is a 65y old  Male who presents with a chief complaint of     HPI: 65 male presents to ER by ambulance from home with report of chest pain, dizziness, trigeminy on cardiac monitor. Patient states he has been having dizziness, light headed intermittent worse in the morning when lying down, had seen his cardiologist Dr. Lafleur, on 12/8/20, placed on a monitor, no beta blockers, states this morning got up to go the bathroom and felt chest tightness, heart skipping beats and dizziness and wife called EMS. Patient had a large BM with some abdominal pain however denies melena or hematochezia. Patient states he felt a chest pressure that lasted a few seconds along with some back pain around 3Am. He denies feeling SOB, diapheretic, vision changes, orthopnea however admits to headache. Sleeps with 1 pillow, no recent changes. Patient states he took 6 baby aspirin prior to coming to ER. Patient right now feeling well, symptoms resolved. Patient reports previous episodes of palpitations but never this significant.       PAST MEDICAL & SURGICAL HISTORY:  Chronic gout, unspecified, with tophus (tophi)    CAD (coronary artery disease)    Renal stones    HLD (hyperlipidemia)    Fatty liver    Hypertension    CAD S/P percutaneous coronary angioplasty  2/2018 and 3/2018 (total 4 stents)        ECHO FINDINGS: < from: Transthoracic Echocardiogram (03.11.19 @ 09:58) >    Patient name: MODE ROSS  YOB: 1955   Age: 63 (M)   MR#: [id number]  Study Date: 3/11/2019  Location: O/PSonographer: Swathi Wheat RDCS  Study quality: Technically good  Referring Physician: NEAL BLACKBURN MD  Blood Pressure: 170/83 mmHg  Height: 178 cm  Weight: 98 kg  BSA: 2.2 m2  ------------------------------------------------------------------------  PROCEDURE: Transthoracic echocardiogram with 2-D, M-Mode  and complete spectral and color flow Doppler.  INDICATION: Atherosclerotic heart disease of native  coronary artery without angina pectoris (I25.10)  ------------------------------------------------------------------------  Dimensions:    Normal Values:  LA:     3.9    2.0 - 4.0 cm  Ao:     3.2    2.0 - 3.8 cm  SEPTUM: 0.6    0.6 - 1.2 cm  PWT:    0.9    0.6 - 1.1 cm  LVIDd:  5.7    3.0 - 5.6 cm  LVIDs:  3.9    1.8 - 4.0 cm  Derived variables:  LVMI: 73 g/m2  RWT: 0.31  Fractional short: 32 %  EF (Teicholtz): 59 %  ------------------------------------------------------------------------  Observations:  Mitral Valve: Normal mitral valve. Mild mitral  regurgitation.  Aortic Valve/Aorta: Thickened aortic valve.  Aortic Root: 3.2 cm.  Left Atrium: Normal left atrium.  LA volume index = 25  cc/m2.  Left Ventricle: Normal left ventricular systolic function.  No segmental wall motion abnormalities. Mild left  ventricular enlargement.  Right Heart: Normal right atrium. Normal right ventricular  size and function. Normal tricuspid valve. Minimal  tricuspid regurgitation. Normal pulmonic valve.  Pericardium/Pleura: Normal pericardium with no pericardial  effusion.  Hemodynamic: Estimated right atrial pressure is 8 mm Hg.  Estimated right ventricular systolic pressure equals 21 mm  Hg, assuming right atrial pressure equals 8 mm Hg,  consistent with normal pulmonary pressures.  ------------------------------------------------------------------------  Conclusions:  1. Mild left ventricular enlargement.  2. Normal left ventricular systolic function. No segmental  wall motion abnormalities.  3. Normal right ventricular size and function.  *** No previous Echo exam.  ------------------------------------------------------------------------  Confirmed on  3/11/2019 - 15:43:28 by Jose Jean Baptiste M.D.  ------------------------------------------------------------------------    < end of copied text >      MEDICATIONS  (STANDING):    MEDICATIONS  (PRN):      FAMILY HISTORY:  Family history of cerebrovascular accident (CVA)    Family history of cardiac pacemaker (Sibling)    Family history of acute myocardial infarction    Denies Family history of CAD or early MI    Constitutional: denies fever, chills  HEENT: denies blurry vision, difficulty hearing  Respiratory: denies SOB, LANGFORD, cough  Cardiovascular: denies CP, palpitations, orthopnea, PND, LE edema  Gastrointestinal: denies nausea, vomiting, abdominal pain  Genitourinary: denies urinary changes  Skin: Denies rashes, itching  Neurologic: denies headache, weakness, dizziness  Hematology/Oncology: denies bleeding, easy bruising    SOCIAL HISTORY: No tobacco, Alcohol or Drug use    Vital Signs Last 24 Hrs  T(C): 36.7 (21 Dec 2020 07:34), Max: 36.7 (21 Dec 2020 07:34)  T(F): 98.1 (21 Dec 2020 07:34), Max: 98.1 (21 Dec 2020 07:34)  HR: 78 (21 Dec 2020 07:34) (78 - 78)  BP: 132/70 (21 Dec 2020 07:34) (132/70 - 132/70)  BP(mean): --  RR: 18 (21 Dec 2020 07:34) (18 - 18)  SpO2: 98% (21 Dec 2020 07:34) (98% - 98%)    Physical Exam:  General: Well developed, well nourished, NAD  HEENT: NCAT, EOMI bl, moist mucous membranes   Neck: Supple, nontender, no mass  Neurology: A&Ox3, nonfocal, sensation intact   Respiratory: CTA B/L, No W/R/R  CV: RRR, +S1/S2, no murmurs, rubs or gallops  Abdominal: Soft, NT, Distended, +BSx4, no palpable masses  Extremities: No C/C, + peripheral pulses, +1 edema b/l LE  MSK: Normal ROM, no joint erythema or warmth, no joint swelling   Heme: No obvious ecchymosis or petechiae   Skin: warm, dry, normal color    ECG: NSR, no ST wave abnormalities     I&O's Detail      LABS:                        14.1   5.37  )-----------( 212      ( 21 Dec 2020 08:37 )             41.0     12-21    140  |  108  |  19  ----------------------------<  111<H>  4.2   |  25  |  1.10    Ca    9.0      21 Dec 2020 08:37  Mg     2.3     12-21    TPro  7.3  /  Alb  4.0  /  TBili  1.0  /  DBili  x   /  AST  27  /  ALT  68  /  AlkPhos  63  12-21    CARDIAC MARKERS ( 21 Dec 2020 08:37 )  <.015 ng/mL / x     / x     / x     / x              I&O's Summary    BNP  RADIOLOGY & ADDITIONAL STUDIES: CHARTING IN PROGRESS  Patient is a 65y old  Male who presents with a chief complaint of     HPI: 65 male presents to ER by ambulance from home with report of chest pain, dizziness, trigeminy on cardiac monitor. Patient states he has been having dizziness, light headed intermittent worse in the morning when lying down, had seen his cardiologist Dr. Lafleur, on 12/8/20, placed on a MCOT monitor for PVC, no beta blockers, states this morning got up to go the bathroom and felt chest tightness, heart skipping beats and dizziness and wife called EMS. Patient had a large BM with some abdominal pain however denies melena or hematochezia. Patient states he felt a chest pressure that lasted a few seconds along with some back pain around 3Am. Patient states when he stood up he felt hot, flushed. He denies feeling SOB, diaphoretic, vision changes, orthopnea however admits to headache. Sleeps with 1 pillow, no recent changes. Patient states he took 6 baby aspirin prior to coming to ER. Patient right now feeling well, symptoms resolved. Patient reports previous episodes of palpitations at rest but never this significant. Patient reports decreased appetite past several and chronic dehydration, decreased PO intake. Patient had trigeminy while in ED.       PAST MEDICAL & SURGICAL HISTORY:  Chronic gout, unspecified, with tophus (tophi)    CAD (coronary artery disease)    Renal stones    HLD (hyperlipidemia)    Fatty liver    Hypertension    CAD S/P percutaneous coronary angioplasty  2/2018 and 3/2018 (total 4 stents)        ECHO FINDINGS: < from: Transthoracic Echocardiogram (03.11.19 @ 09:58) >    Patient name: MODE ROSS  YOB: 1955   Age: 63 (M)   MR#: [id number]  Study Date: 3/11/2019  Location: O/PSonographer: Swathi Wheat BOBBI  Study quality: Technically good  Referring Physician: NEAL BLACKBURN MD  Blood Pressure: 170/83 mmHg  Height: 178 cm  Weight: 98 kg  BSA: 2.2 m2  ------------------------------------------------------------------------  PROCEDURE: Transthoracic echocardiogram with 2-D, M-Mode  and complete spectral and color flow Doppler.  INDICATION: Atherosclerotic heart disease of native  coronary artery without angina pectoris (I25.10)  ------------------------------------------------------------------------  Dimensions:    Normal Values:  LA:     3.9    2.0 - 4.0 cm  Ao:     3.2    2.0 - 3.8 cm  SEPTUM: 0.6    0.6 - 1.2 cm  PWT:    0.9    0.6 - 1.1 cm  LVIDd:  5.7    3.0 - 5.6 cm  LVIDs:  3.9    1.8 - 4.0 cm  Derived variables:  LVMI: 73 g/m2  RWT: 0.31  Fractional short: 32 %  EF (Teicholtz): 59 %  ------------------------------------------------------------------------  Observations:  Mitral Valve: Normal mitral valve. Mild mitral  regurgitation.  Aortic Valve/Aorta: Thickened aortic valve.  Aortic Root: 3.2 cm.  Left Atrium: Normal left atrium.  LA volume index = 25  cc/m2.  Left Ventricle: Normal left ventricular systolic function.  No segmental wall motion abnormalities. Mild left  ventricular enlargement.  Right Heart: Normal right atrium. Normal right ventricular  size and function. Normal tricuspid valve. Minimal  tricuspid regurgitation. Normal pulmonic valve.  Pericardium/Pleura: Normal pericardium with no pericardial  effusion.  Hemodynamic: Estimated right atrial pressure is 8 mm Hg.  Estimated right ventricular systolic pressure equals 21 mm  Hg, assuming right atrial pressure equals 8 mm Hg,  consistent with normal pulmonary pressures.  ------------------------------------------------------------------------  Conclusions:  1. Mild left ventricular enlargement.  2. Normal left ventricular systolic function. No segmental  wall motion abnormalities.  3. Normal right ventricular size and function.  *** No previous Echo exam.  ------------------------------------------------------------------------  Confirmed on  3/11/2019 - 15:43:28 by Jose Jean Baptiste M.D.  ------------------------------------------------------------------------    < end of copied text >      MEDICATIONS  (STANDING):    MEDICATIONS  (PRN):      FAMILY HISTORY:  Family history of cerebrovascular accident (CVA)    Family history of cardiac pacemaker (Sibling)    Family history of acute myocardial infarction    Denies Family history of CAD or early MI    Constitutional: denies fever, chills  HEENT: denies blurry vision, difficulty hearing  Respiratory: denies SOB, LANGFORD, cough  Cardiovascular: denies CP, palpitations, orthopnea, PND, LE edema  Gastrointestinal: denies nausea, vomiting, abdominal pain  Genitourinary: denies urinary changes  Skin: Denies rashes, itching  Neurologic: denies headache, weakness, dizziness  Hematology/Oncology: denies bleeding, easy bruising    SOCIAL HISTORY: No tobacco, Alcohol or Drug use    Vital Signs Last 24 Hrs  T(C): 36.7 (21 Dec 2020 07:34), Max: 36.7 (21 Dec 2020 07:34)  T(F): 98.1 (21 Dec 2020 07:34), Max: 98.1 (21 Dec 2020 07:34)  HR: 78 (21 Dec 2020 07:34) (78 - 78)  BP: 132/70 (21 Dec 2020 07:34) (132/70 - 132/70)  BP(mean): --  RR: 18 (21 Dec 2020 07:34) (18 - 18)  SpO2: 98% (21 Dec 2020 07:34) (98% - 98%)    Physical Exam:  General: Well developed, well nourished, NAD  HEENT: NCAT, EOMI bl, moist mucous membranes   Neck: Supple, nontender, no mass  Neurology: A&Ox3, nonfocal, sensation intact   Respiratory: CTA B/L, No W/R/R  CV: RRR, +S1/S2, no murmurs, rubs or gallops  Abdominal: Soft, NT, Distended, +BSx4, no palpable masses  Extremities: No C/C, + peripheral pulses, +1 edema b/l LE  MSK: Normal ROM, no joint erythema or warmth, no joint swelling   Heme: No obvious ecchymosis or petechiae   Skin: warm, dry, normal color    ECG: NSR, no ST wave abnormalities     I&O's Detail      LABS:                        14.1   5.37  )-----------( 212      ( 21 Dec 2020 08:37 )             41.0     12-21    140  |  108  |  19  ----------------------------<  111<H>  4.2   |  25  |  1.10    Ca    9.0      21 Dec 2020 08:37  Mg     2.3     12-21    TPro  7.3  /  Alb  4.0  /  TBili  1.0  /  DBili  x   /  AST  27  /  ALT  68  /  AlkPhos  63  12-21    CARDIAC MARKERS ( 21 Dec 2020 08:37 )  <.015 ng/mL / x     / x     / x     / x              I&O's Summary    BNP  RADIOLOGY & ADDITIONAL STUDIES: CHARTING IN PROGRESS  Patient is a 65y old  Male who presents with a chief complaint of dizzy cp    HPI: 65 male presents to ER by ambulance from home with report of chest pain, dizziness, trigeminy on cardiac monitor. Patient states he has been having dizziness, light headed intermittent worse in the morning when lying down, had seen his cardiologist Dr. Lafleur, on 12/8/20, placed on a MCOT monitor for PVC, no beta blockers, states this morning got up to go the bathroom and felt chest tightness, heart skipping beats and dizziness and wife called EMS. Patient had a large BM with some abdominal pain however denies melena or hematochezia. Patient states he felt a chest pressure that lasted a few seconds along with some back pain around 3Am. Patient states when he stood up he felt hot, flushed. He denies feeling SOB, diaphoretic, vision changes, orthopnea however admits to headache. Sleeps with 1 pillow, no recent changes. Patient states he took 6 baby aspirin prior to coming to ER. Patient right now feeling well, symptoms resolved. Patient reports previous episodes of palpitations at rest but never this significant. Patient reports decreased appetite past several and chronic dehydration, decreased PO intake. Patient had trigeminy while in ED.       PAST MEDICAL & SURGICAL HISTORY:  Chronic gout, unspecified, with tophus (tophi)    CAD (coronary artery disease)    Renal stones    HLD (hyperlipidemia)    Fatty liver    Hypertension    CAD S/P percutaneous coronary angioplasty  2/2018 and 3/2018 (total 4 stents)        ECHO FINDINGS: < from: Transthoracic Echocardiogram (03.11.19 @ 09:58) >    Patient name: MODE ROSS  YOB: 1955   Age: 63 (M)   MR#: [id number]  Study Date: 3/11/2019  Location: O/PSonographer: Swathi Wheat [facility]  Study quality: Technically good  Referring Physician: NEAL BLACKBURN MD  Blood Pressure: 170/83 mmHg  Height: 178 cm  Weight: 98 kg  BSA: 2.2 m2  ------------------------------------------------------------------------  PROCEDURE: Transthoracic echocardiogram with 2-D, M-Mode  and complete spectral and color flow Doppler.  INDICATION: Atherosclerotic heart disease of native  coronary artery without angina pectoris (I25.10)  ------------------------------------------------------------------------  Dimensions:    Normal Values:  LA:     3.9    2.0 - 4.0 cm  Ao:     3.2    2.0 - 3.8 cm  SEPTUM: 0.6    0.6 - 1.2 cm  PWT:    0.9    0.6 - 1.1 cm  LVIDd:  5.7    3.0 - 5.6 cm  LVIDs:  3.9    1.8 - 4.0 cm  Derived variables:  LVMI: 73 g/m2  RWT: 0.31  Fractional short: 32 %  EF (Teicholtz): 59 %  ------------------------------------------------------------------------  Observations:  Mitral Valve: Normal mitral valve. Mild mitral  regurgitation.  Aortic Valve/Aorta: Thickened aortic valve.  Aortic Root: 3.2 cm.  Left Atrium: Normal left atrium.  LA volume index = 25  cc/m2.  Left Ventricle: Normal left ventricular systolic function.  No segmental wall motion abnormalities. Mild left  ventricular enlargement.  Right Heart: Normal right atrium. Normal right ventricular  size and function. Normal tricuspid valve. Minimal  tricuspid regurgitation. Normal pulmonic valve.  Pericardium/Pleura: Normal pericardium with no pericardial  effusion.  Hemodynamic: Estimated right atrial pressure is 8 mm Hg.  Estimated right ventricular systolic pressure equals 21 mm  Hg, assuming right atrial pressure equals 8 mm Hg,  consistent with normal pulmonary pressures.  ------------------------------------------------------------------------  Conclusions:  1. Mild left ventricular enlargement.  2. Normal left ventricular systolic function. No segmental  wall motion abnormalities.  3. Normal right ventricular size and function.  *** No previous Echo exam.  ------------------------------------------------------------------------  Confirmed on  3/11/2019 - 15:43:28 by Jose Jean Baptiste M.D.  ------------------------------------------------------------------------    < end of copied text >      MEDICATIONS  (STANDING):    MEDICATIONS  (PRN):      FAMILY HISTORY:  Family history of cerebrovascular accident (CVA)    Family history of cardiac pacemaker (Sibling)    Family history of acute myocardial infarction    Denies Family history of CAD or early MI    Constitutional: denies fever, chills  HEENT: denies blurry vision, difficulty hearing  Respiratory: denies SOB, LANGFORD, cough  Cardiovascular: denies CP, palpitations, orthopnea, PND, LE edema  Gastrointestinal: denies nausea, vomiting, abdominal pain  Genitourinary: denies urinary changes  Skin: Denies rashes, itching  Neurologic: denies headache, weakness, dizziness  Hematology/Oncology: denies bleeding, easy bruising    SOCIAL HISTORY: No tobacco, Alcohol or Drug use    Vital Signs Last 24 Hrs  T(C): 36.7 (21 Dec 2020 07:34), Max: 36.7 (21 Dec 2020 07:34)  T(F): 98.1 (21 Dec 2020 07:34), Max: 98.1 (21 Dec 2020 07:34)  HR: 78 (21 Dec 2020 07:34) (78 - 78)  BP: 132/70 (21 Dec 2020 07:34) (132/70 - 132/70)  BP(mean): --  RR: 18 (21 Dec 2020 07:34) (18 - 18)  SpO2: 98% (21 Dec 2020 07:34) (98% - 98%)    Physical Exam:  General: Well developed, well nourished, NAD  HEENT: NCAT, EOMI bl, moist mucous membranes   Neck: Supple, nontender, no mass  Neurology: A&Ox3, nonfocal, sensation intact   Respiratory: CTA B/L, No W/R/R  CV: RRR, +S1/S2, no murmurs, rubs or gallops  Abdominal: Soft, NT, Distended, +BSx4, no palpable masses  Extremities: No C/C, + peripheral pulses, +1 edema b/l LE  MSK: Normal ROM, no joint erythema or warmth, no joint swelling   Heme: No obvious ecchymosis or petechiae   Skin: warm, dry, normal color    ECG: NSR, no ST wave abnormalities     I&O's Detail      LABS:                        14.1   5.37  )-----------( 212      ( 21 Dec 2020 08:37 )             41.0     12-21    140  |  108  |  19  ----------------------------<  111<H>  4.2   |  25  |  1.10    Ca    9.0      21 Dec 2020 08:37  Mg     2.3     12-21    TPro  7.3  /  Alb  4.0  /  TBili  1.0  /  DBili  x   /  AST  27  /  ALT  68  /  AlkPhos  63  12-21    CARDIAC MARKERS ( 21 Dec 2020 08:37 )  <.015 ng/mL / x     / x     / x     / x              I&O's Summary    BNP  RADIOLOGY & ADDITIONAL STUDIES:

## 2020-12-21 NOTE — ED PROVIDER NOTE - CARE PROVIDER_API CALL
Jett Frey  CARDIOLOGY  [facility] Dept of Cardiology, [address] [place] [address]  Phone: [phone]  Fax: [phone]  Follow Up Time:

## 2020-12-21 NOTE — ED PROVIDER NOTE - NSFOLLOWUPINSTRUCTIONS_ED_ALL_ED_FT
WHAT YOU NEED TO KNOW:    Heart palpitations are feelings that your heart races, jumps, throbs, or flutters. You may feel extra beats, no beats for a short time, or skipped beats. You may have these feelings in your chest, throat, or neck. They may happen when you are sitting, standing, or lying. Heart palpitations may be frightening, but are usually not caused by a serious problem.     DISCHARGE INSTRUCTIONS:    Call 911 or have someone else call for any of the following:   •You have any of the following signs of a heart attack: ?Squeezing, pressure, or pain in your chest      ?You may also have any of the following: ?Discomfort or pain in your back, neck, jaw, stomach, or arm      ?Shortness of breath      ?Nausea or vomiting      ?Lightheadedness or a sudden cold sweat        •You have any of the following signs of a stroke: ?Numbness or drooping on one side of your face       ?Weakness in an arm or leg      ?Confusion or difficulty speaking      ?Dizziness, a severe headache, or vision loss      •You faint or lose consciousness.       Return to the emergency department if:   •Your palpitations happen more often or get more intense.           Contact your healthcare provider if:   •You have new or worsening swelling in your feet or ankles.      •You have questions or concerns about your condition or care.      Follow up with your healthcare provider as directed: You may need to follow up with a cardiologist. You may need tests to check for heart problems that cause palpitations. Write down your questions so you remember to ask them during your visits.     Keep a record: Write down when your palpitations start and stop, what you were doing when they started, and your symptoms. Keep track of what you ate or drank within a few hours of your palpitations. Include anything that seemed to help your symptoms, such as lying down or holding your breath. This record will help you and your healthcare provider learn what triggers your palpitations. Bring this record with you to your follow up visits.    Help prevent heart palpitations:   •Manage stress and anxiety. Find ways to relax such as listening to music, meditating, or doing yoga. Exercise can also help decrease stress and anxiety. Talk to someone you trust about your stress or anxiety. You can also talk to a therapist.       •Get plenty of sleep every night. Ask your healthcare provider how much sleep you need each night.       •Do not drink caffeine or alcohol. Caffeine and alcohol can make your palpitations worse. Caffeine is found in soda, coffee, tea, chocolate, and drinks that increase your energy.       •Do not smoke. Nicotine and other chemicals in cigarettes and cigars may damage your heart and blood vessels. Ask your healthcare provider for information if you currently smoke and need help to quit. E-cigarettes or smokeless tobacco still contain nicotine. Talk to your healthcare provider before you use these products.       •Do not use illegal drugs. Talk to your healthcare provider if you use illegal drugs and want help to quit.          © Copyright [employer] 2020           back to top                          © Copyright [employer] 2020

## 2020-12-23 ENCOUNTER — APPOINTMENT (OUTPATIENT)
Dept: CV DIAGNOSITCS | Facility: HOSPITAL | Age: 65
End: 2020-12-23

## 2020-12-23 ENCOUNTER — OUTPATIENT (OUTPATIENT)
Dept: OUTPATIENT SERVICES | Facility: HOSPITAL | Age: 65
LOS: 1 days | End: 2020-12-23
Payer: COMMERCIAL

## 2020-12-23 DIAGNOSIS — Z00.00 ENCOUNTER FOR GENERAL ADULT MEDICAL EXAMINATION WITHOUT ABNORMAL FINDINGS: ICD-10-CM

## 2020-12-23 DIAGNOSIS — I10 ESSENTIAL (PRIMARY) HYPERTENSION: ICD-10-CM

## 2020-12-23 DIAGNOSIS — I25.10 ATHEROSCLEROTIC HEART DISEASE OF NATIVE CORONARY ARTERY WITHOUT ANGINA PECTORIS: ICD-10-CM

## 2020-12-23 DIAGNOSIS — I25.10 ATHEROSCLEROTIC HEART DISEASE OF NATIVE CORONARY ARTERY WITHOUT ANGINA PECTORIS: Chronic | ICD-10-CM

## 2020-12-23 PROCEDURE — 93306 TTE W/DOPPLER COMPLETE: CPT

## 2020-12-23 PROCEDURE — 93306 TTE W/DOPPLER COMPLETE: CPT | Mod: 26

## 2021-01-19 ENCOUNTER — APPOINTMENT (OUTPATIENT)
Dept: CARDIOLOGY | Facility: CLINIC | Age: 66
End: 2021-01-19
Payer: COMMERCIAL

## 2021-01-19 VITALS
OXYGEN SATURATION: 96 % | HEIGHT: 70 IN | DIASTOLIC BLOOD PRESSURE: 78 MMHG | SYSTOLIC BLOOD PRESSURE: 161 MMHG | HEART RATE: 69 BPM | BODY MASS INDEX: 31.21 KG/M2 | WEIGHT: 218 LBS

## 2021-01-19 PROCEDURE — 99072 ADDL SUPL MATRL&STAF TM PHE: CPT

## 2021-01-19 PROCEDURE — 93000 ELECTROCARDIOGRAM COMPLETE: CPT

## 2021-01-19 PROCEDURE — 99214 OFFICE O/P EST MOD 30 MIN: CPT

## 2021-02-23 ENCOUNTER — NON-APPOINTMENT (OUTPATIENT)
Age: 66
End: 2021-02-23

## 2021-02-26 NOTE — HISTORY OF PRESENT ILLNESS
[FreeTextEntry1] : Mr. Prado is a 65 year-old man with known hypertension, hyperlipidemia and CAD with recent cath demonstrating LAD/Diag disease thus had PCI, successfully. He is doing well. He is having a knee cyst removed. He was recently hospitalized for this vague ascending weakness and heat sensation, no syncope and increase in frequency of urination.

## 2021-02-26 NOTE — DISCUSSION/SUMMARY
[FreeTextEntry1] : Mr. Prado is a 65 year-old man with known CAD s/p PCI.\par \par Plan:\par 1. CAD: Given the PCI c/w aspirin and OMT. \par 2. HTN: amlodipine\par 3. HLD: atorvastatin\par 4. Palps: given the symptoms, may all be related to new DM, however will ensure there is no pathologic palps with an event monitor and TTE\par 5. Diet and exercise reiterated to improve overall outcomes and reduce future events.\par 6. Old records requested and reviewed with performing physician.\par 7. Primary and secondary prevention of cardiovascular and related conditions discussed at length, including but not limited to diet and lifestyle modification.\par 8. Patient to return to the office in 1-3 months.\par \par Thank you for allowing me to participate in the care of your patient. If you have any questions, please feel free to contact me at [phone] or via email at [email or web address].\par \par Sincerely,\par \par Jett Frey MD [facility]

## 2021-06-15 ENCOUNTER — NON-APPOINTMENT (OUTPATIENT)
Age: 66
End: 2021-06-15

## 2021-06-15 ENCOUNTER — APPOINTMENT (OUTPATIENT)
Dept: CARDIOLOGY | Facility: CLINIC | Age: 66
End: 2021-06-15
Payer: COMMERCIAL

## 2021-06-15 VITALS
DIASTOLIC BLOOD PRESSURE: 81 MMHG | BODY MASS INDEX: 30.21 KG/M2 | HEART RATE: 66 BPM | HEIGHT: 70 IN | OXYGEN SATURATION: 98 % | SYSTOLIC BLOOD PRESSURE: 158 MMHG | WEIGHT: 211 LBS

## 2021-06-15 PROCEDURE — 93000 ELECTROCARDIOGRAM COMPLETE: CPT

## 2021-06-15 PROCEDURE — 99072 ADDL SUPL MATRL&STAF TM PHE: CPT

## 2021-06-15 PROCEDURE — 99214 OFFICE O/P EST MOD 30 MIN: CPT

## 2021-06-18 NOTE — DISCUSSION/SUMMARY
[FreeTextEntry1] : Mr. Prado is a 65 year-old man with known CAD s/p PCI.\par \par Plan:\par 1. CAD: Given the PCI c/w aspirin and OMT. \par 2. HTN: amlodipine\par 3. HLD: atorvastatin\par 4. Palps: given the symptoms, may all be related to new DM, and no pathologic palps seen on event monitor and TTE\par 5. Diet and exercise reiterated to improve overall outcomes and reduce future events.\par 6. Old records requested and reviewed with performing physician.\par 7. Primary and secondary prevention of cardiovascular and related conditions discussed at length, including but not limited to diet and lifestyle modification.\par 8. Patient to return to the office in 1-3 months.\par \par Thank you for allowing me to participate in the care of your patient. If you have any questions, please feel free to contact me at [phone] or via email at [email or web address].\par \par Sincerely,\par \par Jett Frey MD [facility]

## 2021-10-12 ENCOUNTER — APPOINTMENT (OUTPATIENT)
Dept: CARDIOLOGY | Facility: CLINIC | Age: 66
End: 2021-10-12
Payer: COMMERCIAL

## 2021-10-12 ENCOUNTER — NON-APPOINTMENT (OUTPATIENT)
Age: 66
End: 2021-10-12

## 2021-10-12 VITALS
OXYGEN SATURATION: 98 % | DIASTOLIC BLOOD PRESSURE: 81 MMHG | WEIGHT: 215 LBS | BODY MASS INDEX: 30.78 KG/M2 | HEART RATE: 64 BPM | SYSTOLIC BLOOD PRESSURE: 156 MMHG | HEIGHT: 70 IN

## 2021-10-12 PROCEDURE — 99214 OFFICE O/P EST MOD 30 MIN: CPT

## 2021-10-12 PROCEDURE — 93000 ELECTROCARDIOGRAM COMPLETE: CPT

## 2021-10-20 NOTE — DISCUSSION/SUMMARY
[FreeTextEntry1] : Mr. Prado is a 65 year-old man with known CAD s/p PCI.\par \par Plan:\par 1. CAD: Given the PCI c/w aspirin and OMT. \par 2. HTN: intensive lifestyle interventions, no changes to anti-hypertensives at the moment\par 3. HLD: atorvastatin\par 4. Palps: given the symptoms, may all be related to new DM, and no pathologic palps seen on event monitor and TTE\par 5. Diet and exercise reiterated to improve overall outcomes and reduce future events.\par 6. Old records requested and reviewed with performing physician.\par 7. Primary and secondary prevention of cardiovascular and related conditions discussed at length, including but not limited to diet and lifestyle modification.\par 8. Patient to return to the office in 1-3 months.

## 2021-10-20 NOTE — HISTORY OF PRESENT ILLNESS
[FreeTextEntry1] : Mr. Prado is a 65 year-old man with known hypertension, hyperlipidemia and CAD with recent cath demonstrating LAD/Diag disease thus had PCI, successfully. He is doing well. He is having a knee cyst removed. He was recently hospitalized for this vague ascending weakness and heat sensation, no syncope and increase in frequency of urination. He is doing well with no active cardiac symptoms. Blood pressure sub-optimal but otherwise no issues reported.

## 2022-04-11 ENCOUNTER — RESULT CHARGE (OUTPATIENT)
Age: 67
End: 2022-04-11

## 2022-04-12 ENCOUNTER — NON-APPOINTMENT (OUTPATIENT)
Age: 67
End: 2022-04-12

## 2022-04-12 ENCOUNTER — APPOINTMENT (OUTPATIENT)
Dept: CARDIOLOGY | Facility: CLINIC | Age: 67
End: 2022-04-12
Payer: COMMERCIAL

## 2022-04-12 VITALS
BODY MASS INDEX: 31.5 KG/M2 | SYSTOLIC BLOOD PRESSURE: 185 MMHG | HEIGHT: 70 IN | HEART RATE: 77 BPM | DIASTOLIC BLOOD PRESSURE: 99 MMHG | OXYGEN SATURATION: 96 % | WEIGHT: 220 LBS

## 2022-04-12 PROCEDURE — 93000 ELECTROCARDIOGRAM COMPLETE: CPT

## 2022-04-12 PROCEDURE — 99214 OFFICE O/P EST MOD 30 MIN: CPT

## 2022-04-22 ENCOUNTER — EMERGENCY (EMERGENCY)
Facility: HOSPITAL | Age: 67
LOS: 1 days | Discharge: HOME | End: 2022-04-22
Attending: EMERGENCY MEDICINE
Payer: COMMERCIAL

## 2022-04-22 VITALS
RESPIRATION RATE: 18 BRPM | OXYGEN SATURATION: 99 % | HEART RATE: 57 BPM | TEMPERATURE: 98 F | WEIGHT: 220.02 LBS | HEIGHT: 70 IN | DIASTOLIC BLOOD PRESSURE: 108 MMHG | SYSTOLIC BLOOD PRESSURE: 179 MMHG

## 2022-04-22 DIAGNOSIS — I25.10 ATHEROSCLEROTIC HEART DISEASE OF NATIVE CORONARY ARTERY WITHOUT ANGINA PECTORIS: Chronic | ICD-10-CM

## 2022-04-22 LAB
ALBUMIN SERPL ELPH-MCNC: 4.6 G/DL — SIGNIFICANT CHANGE UP (ref 3.3–5)
ALP SERPL-CCNC: 69 U/L — SIGNIFICANT CHANGE UP (ref 40–120)
ALT FLD-CCNC: 53 U/L — HIGH (ref 10–45)
ANION GAP SERPL CALC-SCNC: 13 MMOL/L — SIGNIFICANT CHANGE UP (ref 5–17)
APTT BLD: 27.1 SEC — LOW (ref 27.5–35.5)
AST SERPL-CCNC: 29 U/L — SIGNIFICANT CHANGE UP (ref 10–40)
BASOPHILS # BLD AUTO: 0.05 K/UL — SIGNIFICANT CHANGE UP (ref 0–0.2)
BASOPHILS NFR BLD AUTO: 0.8 % — SIGNIFICANT CHANGE UP (ref 0–2)
BILIRUB SERPL-MCNC: 1.5 MG/DL — HIGH (ref 0.2–1.2)
BUN SERPL-MCNC: 18 MG/DL — SIGNIFICANT CHANGE UP (ref 7–23)
CALCIUM SERPL-MCNC: 9.1 MG/DL — SIGNIFICANT CHANGE UP (ref 8.4–10.5)
CHLORIDE SERPL-SCNC: 103 MMOL/L — SIGNIFICANT CHANGE UP (ref 96–108)
CK MB CFR SERPL CALC: 2.1 NG/ML — SIGNIFICANT CHANGE UP (ref 0–6.7)
CO2 SERPL-SCNC: 23 MMOL/L — SIGNIFICANT CHANGE UP (ref 22–31)
CREAT SERPL-MCNC: 1.14 MG/DL — SIGNIFICANT CHANGE UP (ref 0.5–1.3)
EGFR: 72 ML/MIN/1.73M2 — SIGNIFICANT CHANGE UP
EOSINOPHIL # BLD AUTO: 0.22 K/UL — SIGNIFICANT CHANGE UP (ref 0–0.5)
EOSINOPHIL NFR BLD AUTO: 3.4 % — SIGNIFICANT CHANGE UP (ref 0–6)
GLUCOSE SERPL-MCNC: 116 MG/DL — HIGH (ref 70–99)
HCT VFR BLD CALC: 43.2 % — SIGNIFICANT CHANGE UP (ref 39–50)
HGB BLD-MCNC: 14.5 G/DL — SIGNIFICANT CHANGE UP (ref 13–17)
IMM GRANULOCYTES NFR BLD AUTO: 0.5 % — SIGNIFICANT CHANGE UP (ref 0–1.5)
INR BLD: 1 RATIO — SIGNIFICANT CHANGE UP (ref 0.88–1.16)
LYMPHOCYTES # BLD AUTO: 1.62 K/UL — SIGNIFICANT CHANGE UP (ref 1–3.3)
LYMPHOCYTES # BLD AUTO: 24.9 % — SIGNIFICANT CHANGE UP (ref 13–44)
MCHC RBC-ENTMCNC: 30.1 PG — SIGNIFICANT CHANGE UP (ref 27–34)
MCHC RBC-ENTMCNC: 33.6 GM/DL — SIGNIFICANT CHANGE UP (ref 32–36)
MCV RBC AUTO: 89.8 FL — SIGNIFICANT CHANGE UP (ref 80–100)
MONOCYTES # BLD AUTO: 0.52 K/UL — SIGNIFICANT CHANGE UP (ref 0–0.9)
MONOCYTES NFR BLD AUTO: 8 % — SIGNIFICANT CHANGE UP (ref 2–14)
NEUTROPHILS # BLD AUTO: 4.07 K/UL — SIGNIFICANT CHANGE UP (ref 1.8–7.4)
NEUTROPHILS NFR BLD AUTO: 62.4 % — SIGNIFICANT CHANGE UP (ref 43–77)
NRBC # BLD: 0 /100 WBCS — SIGNIFICANT CHANGE UP (ref 0–0)
PLATELET # BLD AUTO: 216 K/UL — SIGNIFICANT CHANGE UP (ref 150–400)
POTASSIUM SERPL-MCNC: 4.5 MMOL/L — SIGNIFICANT CHANGE UP (ref 3.5–5.3)
POTASSIUM SERPL-SCNC: 4.5 MMOL/L — SIGNIFICANT CHANGE UP (ref 3.5–5.3)
PROT SERPL-MCNC: 7.2 G/DL — SIGNIFICANT CHANGE UP (ref 6–8.3)
PROTHROM AB SERPL-ACNC: 11.5 SEC — SIGNIFICANT CHANGE UP (ref 10.5–13.4)
RBC # BLD: 4.81 M/UL — SIGNIFICANT CHANGE UP (ref 4.2–5.8)
RBC # FLD: 12.3 % — SIGNIFICANT CHANGE UP (ref 10.3–14.5)
SARS-COV-2 RNA SPEC QL NAA+PROBE: SIGNIFICANT CHANGE UP
SODIUM SERPL-SCNC: 139 MMOL/L — SIGNIFICANT CHANGE UP (ref 135–145)
TROPONIN T, HIGH SENSITIVITY RESULT: 7 NG/L — SIGNIFICANT CHANGE UP (ref 0–51)
TROPONIN T, HIGH SENSITIVITY RESULT: 8 NG/L — SIGNIFICANT CHANGE UP (ref 0–51)
WBC # BLD: 6.51 K/UL — SIGNIFICANT CHANGE UP (ref 3.8–10.5)
WBC # FLD AUTO: 6.51 K/UL — SIGNIFICANT CHANGE UP (ref 3.8–10.5)

## 2022-04-22 PROCEDURE — 99245 OFF/OP CONSLTJ NEW/EST HI 55: CPT

## 2022-04-22 PROCEDURE — 93010 ELECTROCARDIOGRAM REPORT: CPT | Mod: 76

## 2022-04-22 PROCEDURE — 71045 X-RAY EXAM CHEST 1 VIEW: CPT | Mod: 26

## 2022-04-22 PROCEDURE — 99220: CPT

## 2022-04-22 RX ORDER — ATORVASTATIN CALCIUM 80 MG/1
40 TABLET, FILM COATED ORAL AT BEDTIME
Refills: 0 | Status: DISCONTINUED | OUTPATIENT
Start: 2022-04-22 | End: 2022-04-25

## 2022-04-22 RX ORDER — AMLODIPINE BESYLATE 2.5 MG/1
5 TABLET ORAL DAILY
Refills: 0 | Status: DISCONTINUED | OUTPATIENT
Start: 2022-04-22 | End: 2022-04-25

## 2022-04-22 RX ORDER — CARVEDILOL PHOSPHATE 80 MG/1
12.5 CAPSULE, EXTENDED RELEASE ORAL EVERY 12 HOURS
Refills: 0 | Status: DISCONTINUED | OUTPATIENT
Start: 2022-04-22 | End: 2022-04-22

## 2022-04-22 RX ORDER — ASPIRIN/CALCIUM CARB/MAGNESIUM 324 MG
81 TABLET ORAL DAILY
Refills: 0 | Status: DISCONTINUED | OUTPATIENT
Start: 2022-04-22 | End: 2022-04-25

## 2022-04-22 RX ORDER — LISINOPRIL 2.5 MG/1
40 TABLET ORAL DAILY
Refills: 0 | Status: DISCONTINUED | OUTPATIENT
Start: 2022-04-22 | End: 2022-04-25

## 2022-04-22 RX ORDER — CHLORTHALIDONE 50 MG
25 TABLET ORAL DAILY
Refills: 0 | Status: DISCONTINUED | OUTPATIENT
Start: 2022-04-22 | End: 2022-04-25

## 2022-04-22 RX ADMIN — Medication 25 MILLIGRAM(S): at 18:21

## 2022-04-22 RX ADMIN — LISINOPRIL 40 MILLIGRAM(S): 2.5 TABLET ORAL at 18:20

## 2022-04-22 RX ADMIN — Medication 81 MILLIGRAM(S): at 18:20

## 2022-04-22 RX ADMIN — ATORVASTATIN CALCIUM 40 MILLIGRAM(S): 80 TABLET, FILM COATED ORAL at 21:11

## 2022-04-22 NOTE — ED PROVIDER NOTE - OBJECTIVE STATEMENT
65 y/o M PMH HTN HLD CAD s/p 4 stents (years ago) on aspirin 81mg daily presents to ED c/o chest pressure-like discomfort onsets in the morning every morning when he wakes up associated with whole body tingling that improves when he gets up and goes to the bathroom and walks around. reports symptoms have been going on for months, worse over the last 10 days. Pt states he saw his cardiologist Dr. Ann last week, did not have any recent stress/echo/cath. Denies f/c, cough, vomiting. Also reports intermittent nausea, abdominal distension, and sob.

## 2022-04-22 NOTE — CONSULT NOTE ADULT - SUBJECTIVE AND OBJECTIVE BOX
Patient seen and evaluated at bedside    Chief Complaint: chest pain     HPI:  66M PMH HTN HLD CAD s/p 4 stents (years ago) on aspirin 81mg daily presents to ED c/o chest pressure-like discomfort onsets in the morning every morning when he wakes up associated with whole body tingling that improves when he gets up and goes to the bathroom and walks around. Symptoms have been going on for months, worse over the last 10 days. Pt states he saw his cardiologist Dr. Frey last week, did not have any recent stress/echo/cath. Denies f/c, cough, palpitations, nausea, vomiting, trauma.     Of note, patient had unremarkable EGD and colonoscopy recently.     PMHx:   Hypertension    Fatty liver    Gout attack    HLD (hyperlipidemia)    Renal stones    CAD (coronary artery disease)    Lead-induced chronic gout, unspecified hip, with tophus (tophi)    Chronic gout, unspecified, with tophus (tophi)        PSHx:   No significant past surgical history    CAD S/P percutaneous coronary angioplasty        Allergies:  No Known Allergies      Home Meds: As per admission medication reconcilliation.     Current Medications:       FAMILY HISTORY:  Family history of acute myocardial infarction    Family history of cardiac pacemaker (Sibling)    Family history of cerebrovascular accident (CVA)        Social History: Lives with wife. No etoh. No tobacco.     REVIEW OF SYSTEMS:  Constitutional:     [x ] negative [ ] fevers [ ] chills [ ] weight loss [ ] weight gain  HEENT:                  [x ] negative [ ] dry eyes [ ] eye irritation [ ] postnasal drip [ ] nasal congestion  CV:                         [ x] negative  [ ] chest pain [ ] orthopnea [ ] palpitations [ ] murmur  Resp:                     [x ] negative [ ] cough [ ] shortness of breath [ ] dyspnea [ ] wheezing [ ] sputum [ ]hemoptysis  GI:                          [ x] negative [ ] nausea [ ] vomiting [ ] diarrhea [ ] constipation [ ] abd pain [ ] dysphagia   :                        [ x] negative [ ] dysuria [ ] nocturia [ ] hematuria [ ] increased urinary frequency  Musculoskeletal: [x ] negative [ ] back pain [ ] myalgias [ ] arthralgias [ ] fracture  Skin:                       [ x] negative [ ] rash [ ] itch  Neurological:        [ x] negative [ ] headache [ ] dizziness [ ] syncope [ ] weakness [ ] numbness  Psychiatric:           [ x] negative [ ] anxiety [ ] depression  Endocrine:            [ x] negative [ ] diabetes [ ] thyroid problem  Heme/Lymph:      [ x] negative [ ] anemia [ ] bleeding problem  Allergic/Immune: [ x] negative [ ] itchy eyes [ ] nasal discharge [ ] hives [ ] angioedema    [ x] All other systems negative  [ ] Unable to assess ROS due to      Physical Exam:  T(F): 98.3 (04-22), Max: 98.3 (04-22)  HR: 55 (04-22) (55 - 58)  BP: 146/75 (04-22) (146/75 - 179/108)  RR: 16 (04-22)  SpO2: 99% (04-22)  General: Alert, no acute distress, appears comfortable   HEENT: No scleral icterus, EOMI, no facial dysmorphia, no external ear lesions   Cardiac: Regular rate and rhythm, no murmurs, no rubs, no gallops   Pulmonary: Clear breath sounds throughout, no wheezing, no stridor, no crackles   Abdomen: Nondistended, nontender, appears soft   Skin: no obvious rash or lesions   Extremities: no LE edema  Neurological: Moving all 4 extremities, no overt focal deficits noted   Psych: normal mood and affect     Cardiovascular Diagnostic Testing:    ECG: Personally reviewed:  SB. Unchanged from previous.     Echo: Personally reviewed:  Conclusions:  1. Mitral annular calcification, otherwise normal mitral  valve. Minimal mitral regurgitation.  2. Calcified trileaflet aortic valve with normal opening.  Minimal aortic regurgitation.  3. Normal left ventricular systolic function. No segmental  wall motion abnormalities.  4. Normal diastolic function  5. Normal pericardium with trace pericardial effusion.  ------------------------------------------------------------------------  Confirmed on  12/23/2020 - 16:20:26 by Sha Laird M.D.  Stress Testing:    Cath:    Imaging:    CXR: Personally reviewed    Labs: Personally reviewed                        14.5   6.51  )-----------( 216      ( 22 Apr 2022 10:05 )             43.2     04-22    139  |  103  |  18  ----------------------------<  116<H>  4.5   |  23  |  1.14    Ca    9.1      22 Apr 2022 10:05    TPro  7.2  /  Alb  4.6  /  TBili  1.5<H>  /  DBili  x   /  AST  29  /  ALT  53<H>  /  AlkPhos  69  04-22    PT/INR - ( 22 Apr 2022 10:05 )   PT: 11.5 sec;   INR: 1.00 ratio         PTT - ( 22 Apr 2022 10:05 )  PTT:27.1 sec

## 2022-04-22 NOTE — ED CDU PROVIDER INITIAL DAY NOTE - PROGRESS NOTE DETAILS
Called to bedside by RN. Patient states that he is feeling chest pressure and head discomfort, similar to episodes that he has been having for months. Troponin sent. Repeat EKG showed NSR. Discussed with Dr. Estrada. Will continue to monitor closely. - Michelle Santizo PA-C

## 2022-04-22 NOTE — ED CDU PROVIDER INITIAL DAY NOTE - DETAILS
Chest pressure  -Tele monitoring  -Frequent re-evaluations  -TTE  -Cardiology following  Case d/w ED attending Dr. Ryan

## 2022-04-22 NOTE — ED CDU PROVIDER INITIAL DAY NOTE - OBJECTIVE STATEMENT
67 y/o M PMH HTN HLD CAD s/p 4 stents (years ago) on aspirin 81mg daily presents to ED c/o chest pressure-like discomfort onsets in the morning every morning when he wakes up associated with whole body tingling that improves when he gets up and goes to the bathroom and walks around. reports symptoms have been going on for months, worse over the last 10 days. Pt states he saw his cardiologist Dr. Ann last week, did not have any recent stress/echo/cath. Denies f/c, cough, vomiting. Also reports intermittent nausea, abdominal distension, and sob. 67 y/o M PMH HTN HLD CAD s/p 4 stents (years ago) on aspirin 81mg daily presents to ED c/o chest pressure-like discomfort onsets in the morning every morning when he wakes up associated with whole body tingling that improves when he gets up and goes to the bathroom and walks around. reports symptoms have been going on for months, worse over the last 10 days. Pt states he saw his cardiologist Dr. Ann last week, did not have any recent stress/echo/cath. Denies f/c, cough, vomiting. Also reports intermittent nausea, abdominal distension, and sob.    In ED labs not acutely actionable. trop negative. cxr lungs clear. Cardiology evaluated pt, recommended CDU for continued observation, tele monitoring and TTE. Case d/w ED attending Dr. Ryan.

## 2022-04-22 NOTE — ED PROVIDER NOTE - PROGRESS NOTE DETAILS
Ford, PGY3 - spoke w/ Cardiology fellow - discussed trop 7 and ekg sinus sabine, recommend echo and tele monitoring for 24 hours, further recs after echo. CDU called for obs, figuring out beds, will call back

## 2022-04-22 NOTE — ED PROVIDER NOTE - PHYSICAL EXAMINATION
Gen: well appearing male NAD    HEENT: NCAT, EOMI, no nasal discharge, mucous membranes moist  CV: RRR, +S1/S2, no M/R/G  Resp: CTAB, no W/R/R  GI: Abdomen soft non-distended, NTTP, no masses  MSK: No open wounds, no bruising, no LE edema  Neuro: A&Ox4, following commands, moving all four extremities spontaneously  Psych: appropriate mood

## 2022-04-22 NOTE — ED ADULT NURSE REASSESSMENT NOTE - NS ED NURSE REASSESS COMMENT FT1
2245: Pt states he feels flushed, endorses chest pressure and head pressure/discomfort. STAT troponin and EKG performed,  EKG given to STEVEN Santizo. Pt resting on stretcher, sinus bradycardia on monitor hr: 50's. Close monitoring continues.
Received pt from JESSICA Lilly , received pt alert and responsive, oriented x4, denies any respiratory distress, SOB, or difficulty breathing. Pt transferred to CDU for chest pressure. Pt is asymptomatic at this time, denies chest pain, pressure, tightness, SOB, diaphoresis,. dizziness, lightheadedness, N/V, F/C, heart palpitations. On telemetry pt is sinus bradycardia hr: 50's. Pending AM blood work.   IV in place, patent and free of signs of infiltration, V/S stable, pt afebrile. Pt educated on unit and unit rules, instructed patient to notify RN of any needed assistance, Pt verbalizes understanding, Call bell placed within reach. Safety maintained. Will continue to monitor.

## 2022-04-22 NOTE — ED ADULT TRIAGE NOTE - CHIEF COMPLAINT QUOTE
Chest discomfort intermittently stents x4 Denies blood thinners  B/p 184/104  On Betablocker x 1 week  Feels shaky and lightheaded at times Denies numbness or weakness BEFAST neg

## 2022-04-22 NOTE — ED CDU PROVIDER INITIAL DAY NOTE - ATTENDING APP SHARED VISIT CONTRIBUTION OF CARE
Attending MD Ryan:   I personally have seen and examined this patient. I have performed a substantive portion of the visit including all aspects of the medical decision making.   Physician assistant note reviewed and agree on plan of care and except where noted.

## 2022-04-22 NOTE — ED ADULT NURSE NOTE - OBJECTIVE STATEMENT
66 year old male PMHx of HTN HLD CAD s/p 4 stents (last stent(s) 4 years ago) on aspirin 81mg daily presents to ED c/o chest pressure-like discomfort onsets in the morning every morning when he wakes up associated with whole body tingling that improves when he gets up and goes to the bathroom and walks around. reports symptoms have been going on for months, worse over the last 10 days. Pt states he saw his cardiologist Dr. Ann last week, did not have any recent stress/echo/cath. Denies f/c, cough, vomiting. Also reports intermittent nausea, abdominal distension, and sob.

## 2022-04-22 NOTE — ED PROVIDER NOTE - ATTENDING CONTRIBUTION TO CARE
Attending MD Ryan:  I personally have seen and examined this patient. I have performed a substantive portion of the visit including all aspects of the medical decision making.  Resident note reviewed and agree on plan of care and except where noted.        66M with CAD (with stents), HTN, HLD presenting for evaluation for acute on chronic chest discomfort. Patient states "months" of chest symptoms, however he focuses on the last 10 days as particularly worse. He states the discomfort is both under the left breast and central, wraps to the side. Does not radiate to the back. No radiation to the arms. The pain is worse with rest, better with exertion. No associated nausea/vomiting or diaphoresis. He is also having labile blood pressures with readings as high as 180s recently. The patient was started on carvedilol recently by his cardiology Dr. Frey. He currently has some central chest discomfort, better than this morning. The patient notes this morning he had a sensation of whole body tingling and a need to use the bathroom.       T(C): 36.8 (22 Apr 2022 09:11), Max: 36.8 (22 Apr 2022 09:11)  T(F): 98.3 (22 Apr 2022 09:11), Max: 98.3 (22 Apr 2022 09:11)  HR: 58 (22 Apr 2022 09:30) (57 - 58)  BP: 171/81 (22 Apr 2022 09:30) (171/81 - 179/108)  RR: 16 (22 Apr 2022 09:30) (16 - 18)  SpO2: 100% (22 Apr 2022 09:30) (99% - 100%)       Attending MD Ryan:    Gen:  awake and alert, anxious appearing but no apparent distress   Neck: supple, no meningismus   CV: heart with reg rhythm, no obvious murmur appreciated. radial pulses 2+ bilaterally, upper and lower extremities warm to the touch   Resp: clear to auscultation anteriorly bilaterally, breathing comfortably  Abd: soft, NT, ND  Extremities: ankles warm to the touch, no appreciable ankle edema bilaterally  Pysch: appropriate affect    Neuro: moves all extremities spontaneously           66M with CAD (with stents), HTN, HLD presenting for evaluation for acute on chronic chest discomfort. Patient states "months" of chest symptoms, however he focuses on the last 10 days as particularly worse. Chest pain seems chronic in nature and quite atypical for ACS, however given CAD history, will rule out MI with cardiac biomarkers. Aortic dissection is not suspected in this patient. PE is not suspected. Uncertain etiology of whole body symptoms, however CVA is not suspected give lack of laterality. BP 160s here without intervention, malignant HTN not suspected. Will discuss case with cardiology should biomarkers return negative to see if further ischemic evaluation is urgently warranted through the ED given CAD history.        *The above represents an initial assessment/impression. Please refer to progress notes for potential changes in patient clinical course*

## 2022-04-22 NOTE — ED CDU PROVIDER INITIAL DAY NOTE - CHIEF COMPLAINT
The patient is a 66y Male complaining of chest discomfort. Helical Rim Text: The closure involved the helical rim.

## 2022-04-22 NOTE — CONSULT NOTE ADULT - ASSESSMENT
66M PMH HTN HLD CAD s/p 4 stents (years ago) on aspirin 81mg daily present with chest discomfort for several months upon wakening. Found to be hypertensive.     #Chest discomfort in setting of #CAD  -Noncardiac in character. Trop negative. ECG unchanged and no acute ischemic changes. Possibly GI related (?dyspepsia) vs HTN related.   -Resume home ASA, lisinopril, statin  -Would benefit from BB, but given relatively low HR in 50s, would hold off for now.   -Trial Pepcid PO (?had adverse reaction with PPI in past)  -Obtain TTE.  -Telemetry.    #HTN  -SBP 180s. At home reading in AM are 150-160s.   -Resume home Amlodipine 5mg daily, Lisinopril 40mg daily.   -Start on Chlorthalidone 25mg QD  -If HR consistently >65 and SBP>130, can restart Coreg 12.5mg BID

## 2022-04-22 NOTE — ED PROVIDER NOTE - CLINICAL SUMMARY MEDICAL DECISION MAKING FREE TEXT BOX
65 y/o M PMH HTN HLD CAD s/p 4 stents (years ago) on aspirin 81mg daily presents to ED c/o chest pressure-like discomfort onsets in the morning every morning when he wakes up associated with whole body tingling that improves when he gets up and goes to the bathroom and walks around. concern for ACS r/o, arrythmia, Hypokalemia, hypoglycemia. plan labs ekg CXR will discuss w/ cardiology

## 2022-04-22 NOTE — ED PROVIDER NOTE - NSICDXPASTMEDICALHX_GEN_ALL_CORE_FT
PAST MEDICAL HISTORY:  CAD (coronary artery disease)     Chronic gout, unspecified, with tophus (tophi)     Fatty liver     HLD (hyperlipidemia)     Hypertension     Renal stones

## 2022-04-22 NOTE — ED CDU PROVIDER INITIAL DAY NOTE - NS ED ATTENDING STATEMENT MOD
This was a shared visit with the EDUAR. I reviewed and verified the documentation and independently performed the documented:

## 2022-04-22 NOTE — CONSULT NOTE ADULT - ATTENDING COMMENTS
Agree with plan as outlined above.  Hypertensive CP  Plan for [facility] as scheduled as outpatient  Continue new regimen for BP                                                                                           Eighty minutes spent in direct patient care and communication

## 2022-04-22 NOTE — ED ADULT NURSE REASSESSMENT NOTE - COMFORT CARE
ambulated to bathroom/darkened lights/plan of care explained/po fluids offered/warm blanket provided

## 2022-04-23 VITALS
RESPIRATION RATE: 16 BRPM | OXYGEN SATURATION: 95 % | HEART RATE: 77 BPM | SYSTOLIC BLOOD PRESSURE: 167 MMHG | TEMPERATURE: 98 F | DIASTOLIC BLOOD PRESSURE: 86 MMHG

## 2022-04-23 LAB
A1C WITH ESTIMATED AVERAGE GLUCOSE RESULT: 6.1 % — HIGH (ref 4–5.6)
ESTIMATED AVERAGE GLUCOSE: 128 MG/DL — HIGH (ref 68–114)
TROPONIN T, HIGH SENSITIVITY RESULT: 7 NG/L — SIGNIFICANT CHANGE UP (ref 0–51)

## 2022-04-23 PROCEDURE — 99285 EMERGENCY DEPT VISIT HI MDM: CPT

## 2022-04-23 PROCEDURE — U0005: CPT

## 2022-04-23 PROCEDURE — 71045 X-RAY EXAM CHEST 1 VIEW: CPT

## 2022-04-23 PROCEDURE — 83036 HEMOGLOBIN GLYCOSYLATED A1C: CPT

## 2022-04-23 PROCEDURE — G0378: CPT

## 2022-04-23 PROCEDURE — 84484 ASSAY OF TROPONIN QUANT: CPT

## 2022-04-23 PROCEDURE — 85610 PROTHROMBIN TIME: CPT

## 2022-04-23 PROCEDURE — 93306 TTE W/DOPPLER COMPLETE: CPT | Mod: 26

## 2022-04-23 PROCEDURE — 93306 TTE W/DOPPLER COMPLETE: CPT

## 2022-04-23 PROCEDURE — 80053 COMPREHEN METABOLIC PANEL: CPT

## 2022-04-23 PROCEDURE — 85730 THROMBOPLASTIN TIME PARTIAL: CPT

## 2022-04-23 PROCEDURE — 99285 EMERGENCY DEPT VISIT HI MDM: CPT | Mod: 25

## 2022-04-23 PROCEDURE — 36415 COLL VENOUS BLD VENIPUNCTURE: CPT

## 2022-04-23 PROCEDURE — 82553 CREATINE MB FRACTION: CPT

## 2022-04-23 PROCEDURE — 85025 COMPLETE CBC W/AUTO DIFF WBC: CPT

## 2022-04-23 PROCEDURE — U0003: CPT

## 2022-04-23 PROCEDURE — 80061 LIPID PANEL: CPT

## 2022-04-23 PROCEDURE — 99217: CPT | Mod: FS

## 2022-04-23 RX ORDER — CHLORTHALIDONE 50 MG
1 TABLET ORAL
Qty: 30 | Refills: 0
Start: 2022-04-23 | End: 2022-05-22

## 2022-04-23 RX ADMIN — Medication 25 MILLIGRAM(S): at 14:29

## 2022-04-23 RX ADMIN — AMLODIPINE BESYLATE 5 MILLIGRAM(S): 2.5 TABLET ORAL at 04:41

## 2022-04-23 NOTE — PROGRESS NOTE ADULT - SUBJECTIVE AND OBJECTIVE BOX
HPI:  66M PMH HTN HLD CAD s/p 4 stents (years ago) on aspirin 81mg daily presents to ED c/o chest pressure-like discomfort onsets in the morning every morning when he wakes up associated with whole body tingling that improves when he gets up and goes to the bathroom and walks around. Symptoms have been going on for months, worse over the last 10 days. Pt states he saw his cardiologist Dr. Frey last week, did not have any recent stress/echo/cath. Denies f/c, cough, palpitations, nausea, vomiting, trauma.     Of note, patient had unremarkable EGD and colonoscopy recently.     ===========================  Interval Events  -   - BP better controlled  - No reported worsening CP/Palps/SOB\  ===========================      PMHx:   Hypertension    Fatty liver    Gout attack    HLD (hyperlipidemia)    Renal stones    CAD (coronary artery disease)    Lead-induced chronic gout, unspecified hip, with tophus (tophi)    Chronic gout, unspecified, with tophus (tophi)        PSHx:   No significant past surgical history    CAD S/P percutaneous coronary angioplasty        Allergies:  No Known Allergies      Home Meds: As per admission medication reconcilliation.     Current Medications:       FAMILY HISTORY:  Family history of acute myocardial infarction    Family history of cardiac pacemaker (Sibling)    Family history of cerebrovascular accident (CVA)        Social History: Lives with wife. No etoh. No tobacco.     REVIEW OF SYSTEMS:  Constitutional:     [x ] negative [ ] fevers [ ] chills [ ] weight loss [ ] weight gain  HEENT:                  [x ] negative [ ] dry eyes [ ] eye irritation [ ] postnasal drip [ ] nasal congestion  CV:                         [ x] negative  [ ] chest pain [ ] orthopnea [ ] palpitations [ ] murmur  Resp:                     [x ] negative [ ] cough [ ] shortness of breath [ ] dyspnea [ ] wheezing [ ] sputum [ ]hemoptysis  GI:                          [ x] negative [ ] nausea [ ] vomiting [ ] diarrhea [ ] constipation [ ] abd pain [ ] dysphagia   :                        [ x] negative [ ] dysuria [ ] nocturia [ ] hematuria [ ] increased urinary frequency  Musculoskeletal: [x ] negative [ ] back pain [ ] myalgias [ ] arthralgias [ ] fracture  Skin:                       [ x] negative [ ] rash [ ] itch  Neurological:        [ x] negative [ ] headache [ ] dizziness [ ] syncope [ ] weakness [ ] numbness  Psychiatric:           [ x] negative [ ] anxiety [ ] depression  Endocrine:            [ x] negative [ ] diabetes [ ] thyroid problem  Heme/Lymph:      [ x] negative [ ] anemia [ ] bleeding problem  Allergic/Immune: [ x] negative [ ] itchy eyes [ ] nasal discharge [ ] hives [ ] angioedema    [ x] All other systems negative  [ ] Unable to assess ROS due to      Physical Exam:  T(F): 98.3 (04-22), Max: 98.3 (04-22)  HR: 55 (04-22) (55 - 58)  BP: 146/75 (04-22) (146/75 - 179/108)  RR: 16 (04-22)  SpO2: 99% (04-22)  General: Alert, no acute distress, appears comfortable   HEENT: No scleral icterus, EOMI, no facial dysmorphia, no external ear lesions   Cardiac: Regular rate and rhythm, no murmurs, no rubs, no gallops   Pulmonary: Clear breath sounds throughout, no wheezing, no stridor, no crackles   Abdomen: Nondistended, nontender, appears soft   Skin: no obvious rash or lesions   Extremities: no LE edema  Neurological: Moving all 4 extremities, no overt focal deficits noted   Psych: normal mood and affect     Cardiovascular Diagnostic Testing:    ECG: Personally reviewed:  SB. Unchanged from previous.     Echo: Personally reviewed:  Conclusions:  1. Mitral annular calcification, otherwise normal mitral  valve. Minimal mitral regurgitation.  2. Calcified trileaflet aortic valve with normal opening.  Minimal aortic regurgitation.  3. Normal left ventricular systolic function. No segmental  wall motion abnormalities.  4. Normal diastolic function  5. Normal pericardium with trace pericardial effusion.  ------------------------------------------------------------------------  Confirmed on  12/23/2020 - 16:20:26 by Sha Laird M.D.  Stress Testing:    Cath:    Imaging:    CXR: Personally reviewed    Labs: Personally reviewed                        14.5   6.51  )-----------( 216      ( 22 Apr 2022 10:05 )             43.2     04-22    139  |  103  |  18  ----------------------------<  116<H>  4.5   |  23  |  1.14    Ca    9.1      22 Apr 2022 10:05    TPro  7.2  /  Alb  4.6  /  TBili  1.5<H>  /  DBili  x   /  AST  29  /  ALT  53<H>  /  AlkPhos  69  04-22    PT/INR - ( 22 Apr 2022 10:05 )   PT: 11.5 sec;   INR: 1.00 ratio         PTT - ( 22 Apr 2022 10:05 )  PTT:27.1 sec            Assessment and Recommendation:   · Assessment	  66M PMH HTN HLD CAD s/p 4 stents (years ago) on aspirin 81mg daily present with chest discomfort for several months upon wakening. Found to be hypertensive.     #Chest discomfort in setting of #CAD  -Noncardiac in character. Trop negative. ECG unchanged and no acute ischemic changes. Possibly GI related (?dyspepsia) vs HTN related.   -Resume home ASA, lisinopril, statin  -Would benefit from BB, but given relatively low HR in 50s, would hold off for now.   -Trial Pepcid PO (?had adverse reaction with PPI in past)  -TTE normal    #HTN  -SBP 180s. At home reading in AM are 150-160s.   -Resume home Amlodipine 5mg daily, Lisinopril 40mg daily.   -Continue on Chlorthalidone 25mg QD  -If HR consistently >65 and SBP>130, can restart Coreg 6.25 BID  -Otherwise DC home as stable    B Ita

## 2022-04-23 NOTE — ED CDU PROVIDER SUBSEQUENT DAY NOTE - NS ED ROS FT
Constitutional: No fever or chills  Eyes: No visual changes, eye pain   CV: +chest pain No lower extremity edema  Resp: + SOB no cough  GI: No abd pain. + nausea +abdominal distention No vomiting. No diarrhea.    : No dysuria, hematuria.   MSK: No musculoskeletal pain  Skin: No rash  Psych: No complaints   Neuro: +head discomfort. No numbness or tingling. No weakness.  Endo: No DM

## 2022-04-23 NOTE — ED CDU PROVIDER SUBSEQUENT DAY NOTE - PHYSICAL EXAMINATION
GEN: Well Appearing, Nontoxic, NAD  HEENT: NC/AT, Symm Facies. Eyes clear.   CV: No JVD,+S1S2, RRR w/o m/g/r  RESP: CTAB w/o w/r/r  ABD: Soft, nt/nd, +BS.   EXT/MSK: No lower extremity edema or calf tenderness. +palpable radial pulses. FROMx4  SKIN: No visible erythema, lesions or rash  Neuro: Grossly intact, AOX3 with normal speech.   PSYCH: Appropriate mood and affect

## 2022-04-23 NOTE — ED CDU PROVIDER SUBSEQUENT DAY NOTE - HISTORY
No interval changes since initial CDU provider note. Pt states that he is gradually starting to feel better. NAD VSS. no events on tele. Plan to continue tele monitoring in the setting of episodes of chest discomfort. Pending echo. - STEVEN Santizo

## 2022-04-23 NOTE — ED CDU PROVIDER DISPOSITION NOTE - CLINICAL COURSE
67 y/o M PMH HTN HLD CAD s/p 4 stents (years ago) on aspirin 81mg daily presents to ED c/o chest pressure-like discomfort onsets in the morning every morning when he wakes up associated with whole body tingling that improves when he gets up and goes to the bathroom and walks around. reports symptoms have been going on for months, worse over the last 10 days. Pt states he saw his cardiologist Dr. Ann last week, did not have any recent stress/echo/cath. Denies f/c, cough, vomiting. Also reports intermittent nausea, abdominal distension, and sob.  In ED labs not acutely actionable. trop negative. cxr lungs clear. Cardiology evaluated pt, recommended CDU for continued observation, tele monitoring and TTE. Case d/w ED attending Dr. Ryan. 65 y/o M PMH HTN HLD CAD s/p 4 stents (years ago) on aspirin 81mg daily presents to ED c/o chest pressure-like discomfort onsets in the morning every morning when he wakes up associated with whole body tingling that improves when he gets up and goes to the bathroom and walks around. reports symptoms have been going on for months, worse over the last 10 days. Pt states he saw his cardiologist Dr. Ann last week, did not have any recent stress/echo/cath. Denies f/c, cough, vomiting. Also reports intermittent nausea, abdominal distension, and sob.  In ED labs not acutely actionable. trop negative. cxr lungs clear. Cardiology evaluated pt, recommended CDU for continued observation, tele monitoring and TTE. Case d/w ED attending Dr. Ryan.  Patient resting in bed comfortably. No distress, no complaints. Vital Signs Stable. No events on telemetry monitor; Echp- Normal left ventricular systolic function. No segmental wall motion abnormalities. Spoke to cards fellow; pt to hold off BB and start Chlorthalidone 25mg QD with close cardiology followup. Strict return precautions advised. Case discussed with Dr. Sherwood. -STEVEN Garcia.

## 2022-04-23 NOTE — ED CDU PROVIDER DISPOSITION NOTE - CARE PROVIDER_API CALL
Jose Jean Baptiste)  Cardiovascular Disease; Internal Medicine  [address] [place] [address]  Phone: [phone]  Fax: [phone]  Follow Up Time:

## 2022-04-23 NOTE — ED CDU PROVIDER SUBSEQUENT DAY NOTE - PROGRESS NOTE DETAILS
pt c/o intermittent head and body pulsation. no chest pain. pt was evaluated by Cardiology and recommended stopping Coreg and starting Chlorthalidone.  pt awaiting ECHO and reevaluation by cardiology. Patient resting in bed comfortably. No distress, no complaints. Vital Signs Stable. No events on telemetry monitor; Echp- Normal left ventricular systolic function. No segmental wall motion abnormalities. Spoke to cards fellow; pt to hold off BB and start Chlorthalidone 25mg QD with close cardiology followup. Strict return precautions advised. Case discussed with Dr. Sherwood. -STEVEN Garcia

## 2022-04-23 NOTE — ED CDU PROVIDER DISPOSITION NOTE - PATIENT PORTAL LINK FT
You can access the FollowMyHealth Patient Portal offered by [facility] by registering at the following website: [email or web address]. By joining [employer]’s FollowMyHealth portal, you will also be able to view your health information using other applications (apps) compatible with our system.

## 2022-04-23 NOTE — ED CDU PROVIDER DISPOSITION NOTE - NSFOLLOWUPINSTRUCTIONS_ED_ALL_ED_FT
1. Follow up with your PMD and/or cardiologist within 48-72 hours.   2. Show copies of your reports given to you. Stop taking Coreg. Start Chlorthalidone 25mg daily. Take all of your other medications as previously prescribed.     Jose Jean Baptiste)  Cardiovascular Disease; Internal Medicine  [address] [place] [id number]  Phone: [phone]    3. Worsening or continued chest pain, shortness of breath, weakness, return to ED.

## 2022-04-24 LAB
CHOLEST SERPL-MCNC: 126 MG/DL — SIGNIFICANT CHANGE UP
HDLC SERPL-MCNC: 30 MG/DL — LOW
LIPID PNL WITH DIRECT LDL SERPL: 63 MG/DL — SIGNIFICANT CHANGE UP
NON HDL CHOLESTEROL: 95 MG/DL — SIGNIFICANT CHANGE UP
TRIGL SERPL-MCNC: 161 MG/DL — HIGH

## 2022-04-24 NOTE — DISCUSSION/SUMMARY
[FreeTextEntry1] : \par Plan:\par 1. commence carvedilol 12.5mg BID\par 2. encouraged healthy lifestyle measures - diet, cardiovascular exercise, weight loss\par 3. follow-up 1-3 months

## 2022-04-24 NOTE — HISTORY OF PRESENT ILLNESS
[FreeTextEntry1] : Mr. Prado is a 65 year-old man with known hypertension, hyperlipidemia and CAD with recent cath demonstrating LAD/Diag disease thus had PCI, successfully. He is doing well. He is having a knee cyst removed. He was recently hospitalized for this vague ascending weakness and heat sensation, no syncope and increase in frequency of urination. He is doing well with no active cardiac symptoms. Blood pressure sub-optimal but otherwise no issues reported. \par \par Follow-up 4/12/22 - recently reduced amlodipine dose due to lower limb edema. BP today 185/99mmHg. On questioning patient reports engaging in heavy resistance exercise but limited cardiovascular exercise.

## 2022-04-25 NOTE — ED POST DISCHARGE NOTE - DETAILS
4/25/22: results d/w pt, reports similar to prior lipid profiles, advised lifestyle modifications and d/w outpatient providers about fish oil supplementation. all questions answered. -Edward Ayala PA-C

## 2022-06-30 ENCOUNTER — TRANSCRIPTION ENCOUNTER (OUTPATIENT)
Age: 67
End: 2022-06-30

## 2022-07-01 ENCOUNTER — TRANSCRIPTION ENCOUNTER (OUTPATIENT)
Age: 67
End: 2022-07-01

## 2022-07-01 ENCOUNTER — INPATIENT (INPATIENT)
Facility: HOSPITAL | Age: 67
LOS: 0 days | Discharge: HOME | DRG: 419 | End: 2022-07-02
Attending: GENERAL PRACTICE | Admitting: GENERAL PRACTICE
Payer: COMMERCIAL

## 2022-07-01 ENCOUNTER — RESULT REVIEW (OUTPATIENT)
Age: 67
End: 2022-07-01

## 2022-07-01 VITALS
SYSTOLIC BLOOD PRESSURE: 134 MMHG | HEART RATE: 78 BPM | TEMPERATURE: 98 F | HEIGHT: 70 IN | WEIGHT: 218.04 LBS | RESPIRATION RATE: 18 BRPM | DIASTOLIC BLOOD PRESSURE: 82 MMHG | OXYGEN SATURATION: 98 %

## 2022-07-01 DIAGNOSIS — K81.0 ACUTE CHOLECYSTITIS: ICD-10-CM

## 2022-07-01 DIAGNOSIS — K80.20 CALCULUS OF GALLBLADDER WITHOUT CHOLECYSTITIS WITHOUT OBSTRUCTION: ICD-10-CM

## 2022-07-01 DIAGNOSIS — I25.10 ATHEROSCLEROTIC HEART DISEASE OF NATIVE CORONARY ARTERY WITHOUT ANGINA PECTORIS: Chronic | ICD-10-CM

## 2022-07-01 DIAGNOSIS — K81.9 CHOLECYSTITIS, UNSPECIFIED: ICD-10-CM

## 2022-07-01 LAB
ALBUMIN SERPL ELPH-MCNC: 4 G/DL — SIGNIFICANT CHANGE UP (ref 3.3–5)
ALP SERPL-CCNC: 62 U/L — SIGNIFICANT CHANGE UP (ref 40–120)
ALT FLD-CCNC: 59 U/L — SIGNIFICANT CHANGE UP (ref 12–78)
ANION GAP SERPL CALC-SCNC: 7 MMOL/L — SIGNIFICANT CHANGE UP (ref 5–17)
APPEARANCE UR: CLEAR — SIGNIFICANT CHANGE UP
APTT BLD: 26.9 SEC — LOW (ref 27.5–35.5)
AST SERPL-CCNC: 32 U/L — SIGNIFICANT CHANGE UP (ref 15–37)
BASOPHILS # BLD AUTO: 0.03 K/UL — SIGNIFICANT CHANGE UP (ref 0–0.2)
BASOPHILS NFR BLD AUTO: 0.2 % — SIGNIFICANT CHANGE UP (ref 0–2)
BILIRUB SERPL-MCNC: 1.1 MG/DL — SIGNIFICANT CHANGE UP (ref 0.2–1.2)
BILIRUB UR-MCNC: NEGATIVE — SIGNIFICANT CHANGE UP
BUN SERPL-MCNC: 18 MG/DL — SIGNIFICANT CHANGE UP (ref 7–23)
CALCIUM SERPL-MCNC: 9.3 MG/DL — SIGNIFICANT CHANGE UP (ref 8.5–10.1)
CHLORIDE SERPL-SCNC: 109 MMOL/L — HIGH (ref 96–108)
CO2 SERPL-SCNC: 24 MMOL/L — SIGNIFICANT CHANGE UP (ref 22–31)
COLOR SPEC: YELLOW — SIGNIFICANT CHANGE UP
CREAT SERPL-MCNC: 1.1 MG/DL — SIGNIFICANT CHANGE UP (ref 0.5–1.3)
DIFF PNL FLD: NEGATIVE — SIGNIFICANT CHANGE UP
EGFR: 75 ML/MIN/1.73M2 — SIGNIFICANT CHANGE UP
EOSINOPHIL # BLD AUTO: 0 K/UL — SIGNIFICANT CHANGE UP (ref 0–0.5)
EOSINOPHIL NFR BLD AUTO: 0 % — SIGNIFICANT CHANGE UP (ref 0–6)
GLUCOSE SERPL-MCNC: 134 MG/DL — HIGH (ref 70–99)
GLUCOSE UR QL: NEGATIVE — SIGNIFICANT CHANGE UP
HCT VFR BLD CALC: 41.4 % — SIGNIFICANT CHANGE UP (ref 39–50)
HGB BLD-MCNC: 14.3 G/DL — SIGNIFICANT CHANGE UP (ref 13–17)
IMM GRANULOCYTES NFR BLD AUTO: 0.4 % — SIGNIFICANT CHANGE UP (ref 0–1.5)
INR BLD: 1.01 RATIO — SIGNIFICANT CHANGE UP (ref 0.88–1.16)
KETONES UR-MCNC: NEGATIVE — SIGNIFICANT CHANGE UP
LEUKOCYTE ESTERASE UR-ACNC: NEGATIVE — SIGNIFICANT CHANGE UP
LIDOCAIN IGE QN: 197 U/L — SIGNIFICANT CHANGE UP (ref 73–393)
LYMPHOCYTES # BLD AUTO: 0.6 K/UL — LOW (ref 1–3.3)
LYMPHOCYTES # BLD AUTO: 4.8 % — LOW (ref 13–44)
MCHC RBC-ENTMCNC: 31 PG — SIGNIFICANT CHANGE UP (ref 27–34)
MCHC RBC-ENTMCNC: 34.5 GM/DL — SIGNIFICANT CHANGE UP (ref 32–36)
MCV RBC AUTO: 89.6 FL — SIGNIFICANT CHANGE UP (ref 80–100)
MONOCYTES # BLD AUTO: 0.42 K/UL — SIGNIFICANT CHANGE UP (ref 0–0.9)
MONOCYTES NFR BLD AUTO: 3.4 % — SIGNIFICANT CHANGE UP (ref 2–14)
NEUTROPHILS # BLD AUTO: 11.34 K/UL — HIGH (ref 1.8–7.4)
NEUTROPHILS NFR BLD AUTO: 91.2 % — HIGH (ref 43–77)
NITRITE UR-MCNC: NEGATIVE — SIGNIFICANT CHANGE UP
NRBC # BLD: 0 /100 WBCS — SIGNIFICANT CHANGE UP (ref 0–0)
PH UR: 5 — SIGNIFICANT CHANGE UP (ref 5–8)
PLATELET # BLD AUTO: 216 K/UL — SIGNIFICANT CHANGE UP (ref 150–400)
POTASSIUM SERPL-MCNC: 4.6 MMOL/L — SIGNIFICANT CHANGE UP (ref 3.5–5.3)
POTASSIUM SERPL-SCNC: 4.6 MMOL/L — SIGNIFICANT CHANGE UP (ref 3.5–5.3)
PROT SERPL-MCNC: 7.5 G/DL — SIGNIFICANT CHANGE UP (ref 6–8.3)
PROT UR-MCNC: 15
PROTHROM AB SERPL-ACNC: 11.8 SEC — SIGNIFICANT CHANGE UP (ref 10.5–13.4)
RBC # BLD: 4.62 M/UL — SIGNIFICANT CHANGE UP (ref 4.2–5.8)
RBC # FLD: 12.8 % — SIGNIFICANT CHANGE UP (ref 10.3–14.5)
SARS-COV-2 RNA SPEC QL NAA+PROBE: SIGNIFICANT CHANGE UP
SODIUM SERPL-SCNC: 140 MMOL/L — SIGNIFICANT CHANGE UP (ref 135–145)
SP GR SPEC: 1.01 — SIGNIFICANT CHANGE UP (ref 1.01–1.02)
TROPONIN I, HIGH SENSITIVITY RESULT: 5.2 NG/L — SIGNIFICANT CHANGE UP
UROBILINOGEN FLD QL: NEGATIVE — SIGNIFICANT CHANGE UP
WBC # BLD: 12.44 K/UL — HIGH (ref 3.8–10.5)
WBC # FLD AUTO: 12.44 K/UL — HIGH (ref 3.8–10.5)

## 2022-07-01 PROCEDURE — 74177 CT ABD & PELVIS W/CONTRAST: CPT | Mod: 26,MA

## 2022-07-01 PROCEDURE — 88304 TISSUE EXAM BY PATHOLOGIST: CPT | Mod: 26

## 2022-07-01 PROCEDURE — 47562 LAPAROSCOPIC CHOLECYSTECTOMY: CPT | Mod: AS

## 2022-07-01 PROCEDURE — 99285 EMERGENCY DEPT VISIT HI MDM: CPT

## 2022-07-01 PROCEDURE — 99222 1ST HOSP IP/OBS MODERATE 55: CPT

## 2022-07-01 PROCEDURE — 76705 ECHO EXAM OF ABDOMEN: CPT | Mod: 26

## 2022-07-01 DEVICE — LIGATING CLIPS WECK HEMOLOK POLYMER MEDIUM-LARGE (GREEN) 6: Type: IMPLANTABLE DEVICE | Status: FUNCTIONAL

## 2022-07-01 DEVICE — LIGATING CLIPS WECK HEMOLOK POLYMER LARGE (PURPLE) 6: Type: IMPLANTABLE DEVICE | Status: FUNCTIONAL

## 2022-07-01 DEVICE — SURGICEL 2 X 14": Type: IMPLANTABLE DEVICE | Status: FUNCTIONAL

## 2022-07-01 RX ORDER — ATORVASTATIN CALCIUM 80 MG/1
1 TABLET, FILM COATED ORAL
Qty: 0 | Refills: 0 | DISCHARGE

## 2022-07-01 RX ORDER — OXYCODONE HYDROCHLORIDE 5 MG/1
5 TABLET ORAL ONCE
Refills: 0 | Status: DISCONTINUED | OUTPATIENT
Start: 2022-07-01 | End: 2022-07-01

## 2022-07-01 RX ORDER — HYDROMORPHONE HYDROCHLORIDE 2 MG/ML
0.5 INJECTION INTRAMUSCULAR; INTRAVENOUS; SUBCUTANEOUS
Refills: 0 | Status: DISCONTINUED | OUTPATIENT
Start: 2022-07-01 | End: 2022-07-01

## 2022-07-01 RX ORDER — ONDANSETRON 8 MG/1
4 TABLET, FILM COATED ORAL ONCE
Refills: 0 | Status: COMPLETED | OUTPATIENT
Start: 2022-07-01 | End: 2022-07-01

## 2022-07-01 RX ORDER — AMLODIPINE BESYLATE 2.5 MG/1
1 TABLET ORAL
Qty: 0 | Refills: 0 | DISCHARGE

## 2022-07-01 RX ORDER — SODIUM CHLORIDE 9 MG/ML
1000 INJECTION, SOLUTION INTRAVENOUS
Refills: 0 | Status: DISCONTINUED | OUTPATIENT
Start: 2022-07-01 | End: 2022-07-01

## 2022-07-01 RX ORDER — PANTOPRAZOLE SODIUM 20 MG/1
40 TABLET, DELAYED RELEASE ORAL ONCE
Refills: 0 | Status: COMPLETED | OUTPATIENT
Start: 2022-07-01 | End: 2022-07-01

## 2022-07-01 RX ORDER — KETOROLAC TROMETHAMINE 30 MG/ML
15 SYRINGE (ML) INJECTION EVERY 6 HOURS
Refills: 0 | Status: DISCONTINUED | OUTPATIENT
Start: 2022-07-01 | End: 2022-07-01

## 2022-07-01 RX ORDER — ONDANSETRON 8 MG/1
4 TABLET, FILM COATED ORAL ONCE
Refills: 0 | Status: DISCONTINUED | OUTPATIENT
Start: 2022-07-01 | End: 2022-07-01

## 2022-07-01 RX ORDER — PIPERACILLIN AND TAZOBACTAM 4; .5 G/20ML; G/20ML
3.38 INJECTION, POWDER, LYOPHILIZED, FOR SOLUTION INTRAVENOUS ONCE
Refills: 0 | Status: COMPLETED | OUTPATIENT
Start: 2022-07-01 | End: 2022-07-01

## 2022-07-01 RX ORDER — OXYCODONE AND ACETAMINOPHEN 5; 325 MG/1; MG/1
1 TABLET ORAL EVERY 4 HOURS
Refills: 0 | Status: DISCONTINUED | OUTPATIENT
Start: 2022-07-01 | End: 2022-07-02

## 2022-07-01 RX ORDER — AMLODIPINE BESYLATE 2.5 MG/1
5 TABLET ORAL DAILY
Refills: 0 | Status: DISCONTINUED | OUTPATIENT
Start: 2022-07-01 | End: 2022-07-02

## 2022-07-01 RX ORDER — FAMOTIDINE 10 MG/ML
1 INJECTION INTRAVENOUS
Qty: 0 | Refills: 0 | DISCHARGE

## 2022-07-01 RX ORDER — LISINOPRIL 2.5 MG/1
1 TABLET ORAL
Qty: 0 | Refills: 0 | DISCHARGE

## 2022-07-01 RX ORDER — PIPERACILLIN AND TAZOBACTAM 4; .5 G/20ML; G/20ML
3.38 INJECTION, POWDER, LYOPHILIZED, FOR SOLUTION INTRAVENOUS EVERY 8 HOURS
Refills: 0 | Status: DISCONTINUED | OUTPATIENT
Start: 2022-07-02 | End: 2022-07-02

## 2022-07-01 RX ORDER — SODIUM CHLORIDE 9 MG/ML
1000 INJECTION INTRAMUSCULAR; INTRAVENOUS; SUBCUTANEOUS ONCE
Refills: 0 | Status: COMPLETED | OUTPATIENT
Start: 2022-07-01 | End: 2022-07-01

## 2022-07-01 RX ORDER — ALLOPURINOL 300 MG
1 TABLET ORAL
Qty: 0 | Refills: 0 | DISCHARGE

## 2022-07-01 RX ORDER — ONDANSETRON 8 MG/1
4 TABLET, FILM COATED ORAL EVERY 6 HOURS
Refills: 0 | Status: DISCONTINUED | OUTPATIENT
Start: 2022-07-01 | End: 2022-07-01

## 2022-07-01 RX ORDER — ASPIRIN/CALCIUM CARB/MAGNESIUM 324 MG
1 TABLET ORAL
Qty: 0 | Refills: 0 | DISCHARGE

## 2022-07-01 RX ORDER — LISINOPRIL 2.5 MG/1
40 TABLET ORAL DAILY
Refills: 0 | Status: DISCONTINUED | OUTPATIENT
Start: 2022-07-01 | End: 2022-07-02

## 2022-07-01 RX ORDER — SODIUM CHLORIDE 9 MG/ML
1000 INJECTION INTRAMUSCULAR; INTRAVENOUS; SUBCUTANEOUS
Refills: 0 | Status: DISCONTINUED | OUTPATIENT
Start: 2022-07-01 | End: 2022-07-01

## 2022-07-01 RX ORDER — HYDROMORPHONE HYDROCHLORIDE 2 MG/ML
1 INJECTION INTRAMUSCULAR; INTRAVENOUS; SUBCUTANEOUS EVERY 4 HOURS
Refills: 0 | Status: DISCONTINUED | OUTPATIENT
Start: 2022-07-01 | End: 2022-07-01

## 2022-07-01 RX ORDER — HYDROMORPHONE HYDROCHLORIDE 2 MG/ML
0.5 INJECTION INTRAMUSCULAR; INTRAVENOUS; SUBCUTANEOUS EVERY 4 HOURS
Refills: 0 | Status: DISCONTINUED | OUTPATIENT
Start: 2022-07-01 | End: 2022-07-01

## 2022-07-01 RX ORDER — MORPHINE SULFATE 50 MG/1
4 CAPSULE, EXTENDED RELEASE ORAL EVERY 4 HOURS
Refills: 0 | Status: DISCONTINUED | OUTPATIENT
Start: 2022-07-01 | End: 2022-07-02

## 2022-07-01 RX ADMIN — SODIUM CHLORIDE 1000 MILLILITER(S): 9 INJECTION INTRAMUSCULAR; INTRAVENOUS; SUBCUTANEOUS at 11:40

## 2022-07-01 RX ADMIN — PIPERACILLIN AND TAZOBACTAM 200 GRAM(S): 4; .5 INJECTION, POWDER, LYOPHILIZED, FOR SOLUTION INTRAVENOUS at 16:14

## 2022-07-01 RX ADMIN — PANTOPRAZOLE SODIUM 40 MILLIGRAM(S): 20 TABLET, DELAYED RELEASE ORAL at 11:42

## 2022-07-01 RX ADMIN — OXYCODONE AND ACETAMINOPHEN 1 TABLET(S): 5; 325 TABLET ORAL at 22:50

## 2022-07-01 RX ADMIN — OXYCODONE AND ACETAMINOPHEN 1 TABLET(S): 5; 325 TABLET ORAL at 21:54

## 2022-07-01 RX ADMIN — SODIUM CHLORIDE 100 MILLILITER(S): 9 INJECTION INTRAMUSCULAR; INTRAVENOUS; SUBCUTANEOUS at 14:55

## 2022-07-01 RX ADMIN — SODIUM CHLORIDE 75 MILLILITER(S): 9 INJECTION, SOLUTION INTRAVENOUS at 20:10

## 2022-07-01 RX ADMIN — ONDANSETRON 4 MILLIGRAM(S): 8 TABLET, FILM COATED ORAL at 11:41

## 2022-07-01 RX ADMIN — SODIUM CHLORIDE 1000 MILLILITER(S): 9 INJECTION INTRAMUSCULAR; INTRAVENOUS; SUBCUTANEOUS at 12:40

## 2022-07-01 NOTE — DISCHARGE NOTE PROVIDER - HOSPITAL COURSE
66 year old male with PMH CAD s/p 4 stents, HTN, HLD, hx nephrolithiasis, presents with 9/10 upper abdominal pain, nausea, dry heaving without vomiting, and bloating since 02:00 this AM. Also endorses a brief episode of chills. Last ate halibut with rice and feta cheese last night around 20:00. He had a large loose nonbloody BM at 02:00, followed by 3 smaller unremarkable ones. He admits to passing a lot of flatus. He reports similar, less severe episodes every 4-5 months for the past 2 years, went for endoscopy with Dr. Harry Del Cid in July 2021, which showed mild gastritis and patient was put on pepcid (has since been tapering). Patient denies any fever, chest pain, shortness of breath, vomiting, hematemesis, diarrhea, melena, or hematochezia. CT revealed acute cholecystitis and pt admitted and scheduled for OR.    Hospital course:    Patient underwent successful lap cholecystectomy without complications, was sent to PACU then to the floor for monitoring.      Pt given pain control. Diet was advanced appropriately until return of normal GI function was obtained as evidence by toleration of diet. Lab values were monitored.    Disposition: Patient to follow-up with Dr. Zepeda as outpatient in 1 week.

## 2022-07-01 NOTE — CONSULT NOTE ADULT - NS ATTEND AMEND GEN_ALL_CORE FT
-there is no evidence of acute ischemia.  -remote multivessel pci in 2018, no anginal sxs since  -there is no evidence of significant arrhythmia.  -there is no evidence for meaningful  volume overload.  -bp high recently and was on coreg, stopped for hr 50  -no with acute narciso  -planned for surgery  -is optimized from a cv perspective for planned surgery. routine hemodynamic monitoring is recommended  -cont asa, and bp meds as melvin by bp  -will follow

## 2022-07-01 NOTE — PATIENT PROFILE ADULT - FALL HARM RISK - HARM RISK INTERVENTIONS

## 2022-07-01 NOTE — H&P ADULT - ASSESSMENT
66 year old male with PMH CAD s/p 4 stents, HTN, HLD, hx nephrolithiasis, with cholelithiasis, acute cholecystitis.

## 2022-07-01 NOTE — PRE-OP CHECKLIST - SPO2 (%)
pt c/o productive cough and sob x 5 days. reports subjective fevers. also reports generalized abd pain x 5 days. last BM this morning. denies n/v/d.  hx-DM fs 177 97

## 2022-07-01 NOTE — DISCHARGE NOTE PROVIDER - CARE PROVIDERS DIRECT ADDRESSES
ADD (attention deficit disorder)    Difficulty sleeping    Displaced fracture of proximal phalanx of left little finger    Migraine ,DirectAddress_Unknown

## 2022-07-01 NOTE — CONSULT NOTE ADULT - PROBLEM SELECTOR RECOMMENDATION 9
- Follow up abd US  - Follow up HIDA  - NPO, IV fluids  - Pain control PRN  - Discussed with Dr. Zepeda

## 2022-07-01 NOTE — ED PROVIDER NOTE - OBJECTIVE STATEMENT
67 y/o M with hx of HTN, HLD, CAD, gout, fatty liver, kidney stones presents with c/o abdominal pain x today. States that he had dinner last night and woke up at 2am with severe diffuse upper abdominal pain, nausea, dry heaves and excessive burping. States that his abdomen feels bloated. States that he was seen in urgent care today and given zofran with some improvement. States that pain is across entire upper abdomen, burning sensation. States that he had a loose BM last night. Pt is on famotidine daily. Denies recent travel, sick contacts, hx of abdominal surgeries, fever, urinary symptoms, CP, SOB.  PCP: Dr. Brian Beard

## 2022-07-01 NOTE — CONSULT NOTE ADULT - SUBJECTIVE AND OBJECTIVE BOX
[facility] - Morro Og Grossman, [place], Geronimo, Paul, Donna Luna  Office Number: [phone]    Initial Consult Note    CHIEF COMPLAINT: Patient is a 66y old  Male who presents with a chief complaint of Cholecystitis (01 Jul 2022 15:34)      HPI:  NOTE IN PROGRESS      PAST MEDICAL & SURGICAL HISTORY:  Hypertension      Fatty liver      HLD (hyperlipidemia)      Renal stones      CAD (coronary artery disease)      Chronic gout, unspecified, with tophus (tophi)      CAD S/P percutaneous coronary angioplasty  2/2018 and 3/2018 (total 4 stents)        SOCIAL HISTORY:  No tobacco, ethanol, or drug abuse.  FAMILY HISTORY:  Family history of acute myocardial infarction    Family history of cardiac pacemaker (Sibling)    Family history of cerebrovascular accident (CVA)      No family history of acute MI or sudden cardiac death.  MEDICATIONS  (STANDING):  piperacillin/tazobactam IVPB. 3.375 Gram(s) IV Intermittent Once  sodium chloride 0.9%. 1000 milliLiter(s) (100 mL/Hr) IV Continuous <Continuous>    MEDICATIONS  (PRN):  HYDROmorphone  Injectable 0.5 milliGRAM(s) IV Push every 4 hours PRN Moderate Pain (4 - 6)  HYDROmorphone  Injectable 1 milliGRAM(s) IV Push every 4 hours PRN Severe Pain (7 - 10)  ketorolac   Injectable 15 milliGRAM(s) IV Push every 6 hours PRN Mild Pain (1 - 3)  ondansetron Injectable 4 milliGRAM(s) IV Push every 6 hours PRN Nausea and/or Vomiting    Allergies    No Known Allergies    Intolerances      REVIEW OF SYSTEMS:  All other review of systems is negative unless indicated above      VITAL SIGNS:   Vital Signs Last 24 Hrs  T(C): 36.4 (01 Jul 2022 10:59), Max: 36.4 (01 Jul 2022 10:59)  T(F): 97.5 (01 Jul 2022 10:59), Max: 97.5 (01 Jul 2022 10:59)  HR: 78 (01 Jul 2022 10:59) (78 - 78)  BP: 134/82 (01 Jul 2022 10:59) (134/82 - 134/82)  BP(mean): --  RR: 18 (01 Jul 2022 10:59) (18 - 18)  SpO2: 98% (01 Jul 2022 10:59) (98% - 98%)  I&O's Summary    Physical exam:           LABS: All Labs Reviewed:                        14.3   12.44 )-----------( 216      ( 01 Jul 2022 11:20 )             41.4     01 Jul 2022 11:20    140    |  109    |  18     ----------------------------<  134    4.6     |  24     |  1.10     Ca    9.3        01 Jul 2022 11:20    TPro  7.5    /  Alb  4.0    /  TBili  1.1    /  DBili  x      /  AST  32     /  ALT  59     /  AlkPhos  62     01 Jul 2022 11:20          Blood Culture:         RADIOLOGY:  ACC: [id number] EXAM:  US ABDOMEN RT UPR QUADRANT                          PROCEDURE DATE:  07/01/2022          INTERPRETATION:  Right upper quadrant pain. CT of the same day   demonstrated an abnormal gallbladder.    Right upper quadrant ultrasound.    Gallstone impacted in the gallbladder neck. Borderline to mild diffuse   gallbladder wall thickening. Findings are concerning for acute   cholecystitis. If warranted HIDA scan can be obtained for additional   evaluation. No biliary dilatation. Common duct 0.3 cm. Hepatic parenchyma   demonstrates diffuse increased echotexture consistent with steatosis.   Pancreas not adequately visualized.  Right kidney unremarkable.   No ascites    IMPRESSION:  Cholelithiasis. Suspicious for cholecystitis. Ifdesired HIDA scan can be   obtained for additional evaluation. No biliary dilatation.  Fatty liver.    --- End of Report ---      EKG: NSR 73 bpm     TTE:   Patient name: MODE ROSS  YOB: 1955   Age: 66 (M)   MR#: [id number]  Study Date: 4/23/2022  Location: O/PSonographer: Mitzi Quinn [facility]  Study quality: Technically fair  Referring Physician: Vinicius Sherwood MD  Blood Pressure: 145/71 mmHg  Height: 178 cm  Weight: 100 kg  BSA: 2.2 m2  ------------------------------------------------------------------------  PROCEDURE: Transthoracic echocardiogram with 2-D, M-Mode  and complete spectral and color flow Doppler.  INDICATION: Chest pain, unspecified (R07.9)  ------------------------------------------------------------------------  Dimensions:    Normal Values:  LA:     3.5    2.0 - 4.0 cm  Ao:     3.4    2.0 - 3.8 cm  SEPTUM: 1.1    0.6 - 1.2 cm  PWT:    0.9    0.6 - 1.1 cm  LVIDd:  5.1    3.0 - 5.6 cm  LVIDs:  2.9    1.8 - 4.0 cm  Derived variables:  LVMI: 87 g/m2  RWT: 0.35  EF (Visual Estimate): 70 %  Doppler Peak Velocity (m/sec): TV=2.3  ------------------------------------------------------------------------  Observations:  Mitral Valve: Normal mitral valve.  Aortic Valve/Aorta: Calcified aortic valve with normal  opening.  Normal aortic root size.  Left Atrium: Normal left atrium.  Left Ventricle: Normal left ventricular internal dimensions  and wall thickness.  Normal left ventricular systolic function. No segmental  wall motion abnormalities.  Normal diastolic function.  Right Heart: Normal right atrium. Normal right ventricular  size and function.  Normal tricuspid valve. Minimal tricuspid regurgitation.  Normal pulmonic valve. Minimal pulmonic regurgitation.  Pericardium/Pleura: Normal pericardium with no pericardial  effusion.  Hemodynamic: Estimated right atrial pressure is normal.  No evidence ofpulmonary hypertension.  ------------------------------------------------------------------------  Conclusions:  Normal left ventricular systolic function. No segmental  wall motion abnormalities.  ------------------------------------------------------------------------  Confirmed on  4/23/2022 - 13:50:44 by Nomi Small MD, FASE  -------------------------------------------       [facility] - Morro Og, Pablo Walters, Geronimo, Paul, Donna Luna  Office Number: [phone]    Initial Consult Note    CHIEF COMPLAINT: Patient is a 66y old  Male who presents with a chief complaint of Cholecystitis (01 Jul 2022 15:34)      HPI:   66 year old male with PMH CAD s/p 4 stents ( on aspirin), HTN, HLD, hx nephrolithiasis, with cholelithiasis, acute cholecystitis with laparoscopic cholecystectomy. He  denies chest pain, palpitation, SOB, syncope, dizziness, lightheadedness, orthopnea, PND, LANGFORD and edema. Reports active lifestyle, climbs stairs with difficulty.  Denies any recent stress test.   He reports baseline -140s, was briefly started on coreg but did tolerate due to bradycardia.  He also reports b/l LE edema when his Norvasc was uptitrated to 10mg.       PAST MEDICAL & SURGICAL HISTORY:  Hypertension      Fatty liver      HLD (hyperlipidemia)      Renal stones      CAD (coronary artery disease)      Chronic gout, unspecified, with tophus (tophi)      CAD S/P percutaneous coronary angioplasty  2/2018 and 3/2018 (total 4 stents)        SOCIAL HISTORY:  No tobacco, ethanol, or drug abuse.  FAMILY HISTORY:  Family history of acute myocardial infarction    Family history of cardiac pacemaker (Sibling)    Family history of cerebrovascular accident (CVA)      No family history of acute MI or sudden cardiac death.  MEDICATIONS  (STANDING):  piperacillin/tazobactam IVPB. 3.375 Gram(s) IV Intermittent Once  sodium chloride 0.9%. 1000 milliLiter(s) (100 mL/Hr) IV Continuous <Continuous>    MEDICATIONS  (PRN):  HYDROmorphone  Injectable 0.5 milliGRAM(s) IV Push every 4 hours PRN Moderate Pain (4 - 6)  HYDROmorphone  Injectable 1 milliGRAM(s) IV Push every 4 hours PRN Severe Pain (7 - 10)  ketorolac   Injectable 15 milliGRAM(s) IV Push every 6 hours PRN Mild Pain (1 - 3)  ondansetron Injectable 4 milliGRAM(s) IV Push every 6 hours PRN Nausea and/or Vomiting    Allergies    No Known Allergies    Intolerances      REVIEW OF SYSTEMS:  All other review of systems is negative unless indicated above      VITAL SIGNS:   Vital Signs Last 24 Hrs  T(C): 36.4 (01 Jul 2022 10:59), Max: 36.4 (01 Jul 2022 10:59)  T(F): 97.5 (01 Jul 2022 10:59), Max: 97.5 (01 Jul 2022 10:59)  HR: 78 (01 Jul 2022 10:59) (78 - 78)  BP: 134/82 (01 Jul 2022 10:59) (134/82 - 134/82)  BP(mean): --  RR: 18 (01 Jul 2022 10:59) (18 - 18)  SpO2: 98% (01 Jul 2022 10:59) (98% - 98%)  I&O's Summary    PHYSICAL EXAM:  GENERAL: NAD, well-developed  HEAD:  Atraumatic, Normocephalic  EYES: EOMI, PERRLA, conjunctiva and sclera clear  NECK: Supple, No JVD  CHEST/LUNG: Clear to auscultation bilaterally; No wheeze  HEART: Regular rate and rhythm; No murmurs, rubs, or gallops  ABDOMEN: Soft, Nontender, Nondistended; Bowel sounds present  EXTREMITIES:  2+ Peripheral Pulses, No clubbing, cyanosis, or edema  PSYCH: AAOx3  NEUROLOGY: non-focal  SKIN: No rashes or lesions        LABS: All Labs Reviewed:                        14.3   12.44 )-----------( 216      ( 01 Jul 2022 11:20 )             41.4     01 Jul 2022 11:20    140    |  109    |  18     ----------------------------<  134    4.6     |  24     |  1.10     Ca    9.3        01 Jul 2022 11:20    TPro  7.5    /  Alb  4.0    /  TBili  1.1    /  DBili  x      /  AST  32     /  ALT  59     /  AlkPhos  62     01 Jul 2022 11:20          Blood Culture:         RADIOLOGY:  ACC: [id number] EXAM:  US ABDOMEN RT UPR QUADRANT                          PROCEDURE DATE:  07/01/2022          INTERPRETATION:  Right upper quadrant pain. CT of the same day   demonstrated an abnormal gallbladder.    Right upper quadrant ultrasound.    Gallstone impacted in the gallbladder neck. Borderline to mild diffuse   gallbladder wall thickening. Findings are concerning for acute   cholecystitis. If warranted HIDA scan can be obtained for additional   evaluation. No biliary dilatation. Common duct 0.3 cm. Hepatic parenchyma   demonstrates diffuse increased echotexture consistent with steatosis.   Pancreas not adequately visualized.  Right kidney unremarkable.   No ascites    IMPRESSION:  Cholelithiasis. Suspicious for cholecystitis. Ifdesired HIDA scan can be   obtained for additional evaluation. No biliary dilatation.  Fatty liver.    --- End of Report ---      EKG: NSR 73 bpm     TTE:   Patient name: MODE ROSS  YOB: 1955   Age: 66 (M)   MR#: [id number]  Study Date: 4/23/2022  Location: O/PSonographer: Mitzi Quinn [facility]  Study quality: Technically fair  Referring Physician: Vinicius Sherwood MD  Blood Pressure: 145/71 mmHg  Height: 178 cm  Weight: 100 kg  BSA: 2.2 m2  ------------------------------------------------------------------------  PROCEDURE: Transthoracic echocardiogram with 2-D, M-Mode  and complete spectral and color flow Doppler.  INDICATION: Chest pain, unspecified (R07.9)  ------------------------------------------------------------------------  Dimensions:    Normal Values:  LA:     3.5    2.0 - 4.0 cm  Ao:     3.4    2.0 - 3.8 cm  SEPTUM: 1.1    0.6 - 1.2 cm  PWT:    0.9    0.6 - 1.1 cm  LVIDd:  5.1    3.0 - 5.6 cm  LVIDs:  2.9    1.8 - 4.0 cm  Derived variables:  LVMI: 87 g/m2  RWT: 0.35  EF (Visual Estimate): 70 %  Doppler Peak Velocity (m/sec): TV=2.3  ------------------------------------------------------------------------  Observations:  Mitral Valve: Normal mitral valve.  Aortic Valve/Aorta: Calcified aortic valve with normal  opening.  Normal aortic root size.  Left Atrium: Normal left atrium.  Left Ventricle: Normal left ventricular internal dimensions  and wall thickness.  Normal left ventricular systolic function. No segmental  wall motion abnormalities.  Normal diastolic function.  Right Heart: Normal right atrium. Normal right ventricular  size and function.  Normal tricuspid valve. Minimal tricuspid regurgitation.  Normal pulmonic valve. Minimal pulmonic regurgitation.  Pericardium/Pleura: Normal pericardium with no pericardial  effusion.  Hemodynamic: Estimated right atrial pressure is normal.  No evidence ofpulmonary hypertension.  ------------------------------------------------------------------------  Conclusions:  Normal left ventricular systolic function. No segmental  wall motion abnormalities.  ------------------------------------------------------------------------  Confirmed on  4/23/2022 - 13:50:44 by Nomi Small MD, FASE  -------------------------------------------       [facility] - Morro Og, Pablo Walters, Geronimo, Paul, Donna Luna  Office Number: [phone]    Initial Consult Note    CHIEF COMPLAINT: Patient is a 66y old  Male who presents with a chief complaint of Cholecystitis (01 Jul 2022 15:34)      HPI:   66 year old male with PMH CAD s/p 4 stents ( 2018, on aspirin), HTN, HLD, hx nephrolithiasis, with cholelithiasis, acute cholecystitis with laparoscopic cholecystectomy. He  denies chest pain, palpitation, SOB, syncope, dizziness, lightheadedness, orthopnea, PND, LANGFORD and edema. Reports active lifestyle, climbs stairs with difficulty.  Denies any recent stress test.   He reports baseline -140s, was briefly started on coreg but did tolerate due to bradycardia.  He also reports b/l LE edema when his Norvasc was uptitrated to 10mg.       PAST MEDICAL & SURGICAL HISTORY:  Hypertension      Fatty liver      HLD (hyperlipidemia)      Renal stones      CAD (coronary artery disease)      Chronic gout, unspecified, with tophus (tophi)      CAD S/P percutaneous coronary angioplasty  2/2018 and 3/2018 (total 4 stents)        SOCIAL HISTORY:  No tobacco, ethanol, or drug abuse.  FAMILY HISTORY:  Family history of acute myocardial infarction    Family history of cardiac pacemaker (Sibling)    Family history of cerebrovascular accident (CVA)      No family history of acute MI or sudden cardiac death.  MEDICATIONS  (STANDING):  piperacillin/tazobactam IVPB. 3.375 Gram(s) IV Intermittent Once  sodium chloride 0.9%. 1000 milliLiter(s) (100 mL/Hr) IV Continuous <Continuous>    MEDICATIONS  (PRN):  HYDROmorphone  Injectable 0.5 milliGRAM(s) IV Push every 4 hours PRN Moderate Pain (4 - 6)  HYDROmorphone  Injectable 1 milliGRAM(s) IV Push every 4 hours PRN Severe Pain (7 - 10)  ketorolac   Injectable 15 milliGRAM(s) IV Push every 6 hours PRN Mild Pain (1 - 3)  ondansetron Injectable 4 milliGRAM(s) IV Push every 6 hours PRN Nausea and/or Vomiting    Allergies    No Known Allergies    Intolerances      REVIEW OF SYSTEMS:  All other review of systems is negative unless indicated above      VITAL SIGNS:   Vital Signs Last 24 Hrs  T(C): 36.4 (01 Jul 2022 10:59), Max: 36.4 (01 Jul 2022 10:59)  T(F): 97.5 (01 Jul 2022 10:59), Max: 97.5 (01 Jul 2022 10:59)  HR: 78 (01 Jul 2022 10:59) (78 - 78)  BP: 134/82 (01 Jul 2022 10:59) (134/82 - 134/82)  BP(mean): --  RR: 18 (01 Jul 2022 10:59) (18 - 18)  SpO2: 98% (01 Jul 2022 10:59) (98% - 98%)  I&O's Summary    PHYSICAL EXAM:  GENERAL: NAD, well-developed  HEAD:  Atraumatic, Normocephalic  EYES: EOMI, PERRLA, conjunctiva and sclera clear  NECK: Supple, No JVD  CHEST/LUNG: Clear to auscultation bilaterally; No wheeze  HEART: Regular rate and rhythm; No murmurs, rubs, or gallops  ABDOMEN: Soft, Nontender, Nondistended; Bowel sounds present  EXTREMITIES:  2+ Peripheral Pulses, No clubbing, cyanosis, or edema  PSYCH: AAOx3  NEUROLOGY: non-focal  SKIN: No rashes or lesions        LABS: All Labs Reviewed:                        14.3   12.44 )-----------( 216      ( 01 Jul 2022 11:20 )             41.4     01 Jul 2022 11:20    140    |  109    |  18     ----------------------------<  134    4.6     |  24     |  1.10     Ca    9.3        01 Jul 2022 11:20    TPro  7.5    /  Alb  4.0    /  TBili  1.1    /  DBili  x      /  AST  32     /  ALT  59     /  AlkPhos  62     01 Jul 2022 11:20          Blood Culture:         RADIOLOGY:  ACC: [id number] EXAM:  US ABDOMEN RT UPR QUADRANT                          PROCEDURE DATE:  07/01/2022          INTERPRETATION:  Right upper quadrant pain. CT of the same day   demonstrated an abnormal gallbladder.    Right upper quadrant ultrasound.    Gallstone impacted in the gallbladder neck. Borderline to mild diffuse   gallbladder wall thickening. Findings are concerning for acute   cholecystitis. If warranted HIDA scan can be obtained for additional   evaluation. No biliary dilatation. Common duct 0.3 cm. Hepatic parenchyma   demonstrates diffuse increased echotexture consistent with steatosis.   Pancreas not adequately visualized.  Right kidney unremarkable.   No ascites    IMPRESSION:  Cholelithiasis. Suspicious for cholecystitis. Ifdesired HIDA scan can be   obtained for additional evaluation. No biliary dilatation.  Fatty liver.    --- End of Report ---      EKG: NSR 73 bpm     TTE:   Patient name: MODE ROSS  YOB: 1955   Age: 66 (M)   MR#: [id number]  Study Date: 4/23/2022  Location: O/PSonographer: Mitzi Quinn [facility]  Study quality: Technically fair  Referring Physician: Vinicius Sherwood MD  Blood Pressure: 145/71 mmHg  Height: 178 cm  Weight: 100 kg  BSA: 2.2 m2  ------------------------------------------------------------------------  PROCEDURE: Transthoracic echocardiogram with 2-D, M-Mode  and complete spectral and color flow Doppler.  INDICATION: Chest pain, unspecified (R07.9)  ------------------------------------------------------------------------  Dimensions:    Normal Values:  LA:     3.5    2.0 - 4.0 cm  Ao:     3.4    2.0 - 3.8 cm  SEPTUM: 1.1    0.6 - 1.2 cm  PWT:    0.9    0.6 - 1.1 cm  LVIDd:  5.1    3.0 - 5.6 cm  LVIDs:  2.9    1.8 - 4.0 cm  Derived variables:  LVMI: 87 g/m2  RWT: 0.35  EF (Visual Estimate): 70 %  Doppler Peak Velocity (m/sec): TV=2.3  ------------------------------------------------------------------------  Observations:  Mitral Valve: Normal mitral valve.  Aortic Valve/Aorta: Calcified aortic valve with normal  opening.  Normal aortic root size.  Left Atrium: Normal left atrium.  Left Ventricle: Normal left ventricular internal dimensions  and wall thickness.  Normal left ventricular systolic function. No segmental  wall motion abnormalities.  Normal diastolic function.  Right Heart: Normal right atrium. Normal right ventricular  size and function.  Normal tricuspid valve. Minimal tricuspid regurgitation.  Normal pulmonic valve. Minimal pulmonic regurgitation.  Pericardium/Pleura: Normal pericardium with no pericardial  effusion.  Hemodynamic: Estimated right atrial pressure is normal.  No evidence ofpulmonary hypertension.  ------------------------------------------------------------------------  Conclusions:  Normal left ventricular systolic function. No segmental  wall motion abnormalities.  ------------------------------------------------------------------------  Confirmed on  4/23/2022 - 13:50:44 by Nomi Small MD, FASE  -------------------------------------------

## 2022-07-01 NOTE — ED PROVIDER NOTE - PROGRESS NOTE DETAILS
Spoke with surgery STEVEN Leger, will consult pt. Pt seen by surgeon, Dr. Bess, advised to admit to his service for OR today.

## 2022-07-01 NOTE — H&P ADULT - NSHPLABSRESULTS_GEN_ALL_CORE
LABS:             14.3   12.44 )-----------( 216      ( 01 Jul 2022 11:20 )             41.4     07-01    140  |  109<H>  |  18  ----------------------------<  134<H>  4.6   |  24  |  1.10    Ca    9.3      01 Jul 2022 11:20    TPro  7.5  /  Alb  4.0  /  TBili  1.1  /  DBili  x   /  AST  32  /  ALT  59  /  AlkPhos  62  07-01    RADIOLOGY:  < from: US Abdomen Upper Quadrant Right (07.01.22 @ 14:32) >  IMPRESSION:  Cholelithiasis. Suspicious for cholecystitis. If desired HIDA scan can be   obtained for additional evaluation. No biliary dilatation.  Fatty liver.    < from: CT Abdomen and Pelvis w/ IV Cont (07.01.22 @ 12:47) >  IMPRESSION:  Cholelithiasis. Pericholecystic inflammation concerning for acute   cholecystitis. Correlate with right upper quadrant ultrasound.  Nonobstructive left nephrolithiasis.

## 2022-07-01 NOTE — CONSULT NOTE ADULT - SUBJECTIVE AND OBJECTIVE BOX
HPI:  66 year old male with PMH CAD s/p 4 stents, HTN, HLD, hx nephrolithiasis, presents with 9/10 upper abdominal pain, nausea, dry heaving without vomiting, and bloating since 02:00 this AM. Also endorses a brief episode of chills. Last ate halibut with rice and feta cheese last night around 20:00. He had a large loose nonbloody BM at 02:00, followed by 3 smaller unremarkable ones. He admits to passing a lot of flatus. He reports similar, less severe episodes every 4-5 months for the past 2 years, went for endoscopy with Dr. Harry Del Cid in July 2021, which showed mild gastritis and patient was put on pepcid (has since been tapering). Patient denies any fever, chest pain, shortness of breath, vomiting, hematemesis, diarrhea, melena, or hematochezia.    PAST MEDICAL & SURGICAL HISTORY:  Hypertension  Fatty liver  HLD (hyperlipidemia)  Renal stones  Chronic gout, unspecified, with tophus (tophi)  CAD S/P percutaneous coronary angioplasty, 2/2018 and 3/2018 (total 4 stents)    REVIEW OF SYSTEMS:  CONSTITUTIONAL: No weakness or fevers.  EYES/ENT: No visual changes;  No vertigo or throat pain   NECK: No pain or stiffness  RESPIRATORY: No cough, wheezing, hemoptysis; No shortness of breath  CARDIOVASCULAR: No chest pain or palpitations  GASTROINTESTINAL: See HPI. No vomiting, or hematemesis; No diarrhea or constipation. No melena or hematochezia.  GENITOURINARY: No dysuria, frequency or hematuria  NEUROLOGICAL: No numbness or weakness  SKIN: No itching, burning, rashes, or lesions   All other review of systems is negative unless indicated above.    ALLERGIES:  No Known Allergies    SOCIAL HISTORY:  Occasional ETOH, 2-3 drinks/week. Nonsmoker. No illicit drug use.    FAMILY HISTORY:  Family history of acute myocardial infarction  Family history of cardiac pacemaker (Sibling)  Family history of cerebrovascular accident (CVA)    VITAL SIGNS:  Vital Signs Last 24 Hrs  T(C): 36.4 (01 Jul 2022 10:59), Max: 36.4 (01 Jul 2022 10:59)  T(F): 97.5 (01 Jul 2022 10:59), Max: 97.5 (01 Jul 2022 10:59)  HR: 78 (01 Jul 2022 10:59) (78 - 78)  BP: 134/82 (01 Jul 2022 10:59) (134/82 - 134/82)  RR: 18 (01 Jul 2022 10:59) (18 - 18)  SpO2: 98% (01 Jul 2022 10:59) (98% - 98%)    PHYSICAL EXAM:  GENERAL:  Well-nourished, well-developed male lying comfortably in bed in NAD.  HEENT:  NC/AT. Sclera white. Mucous membranes moist.  CARDIO:  Regular rate and rhythm.  No murmur, gallop or rub appreciated.  RESPIRATORY:  Nonlabored breathing, no accessory muscle use. Lungs clear to auscultation bilaterally.  No wheezing, rales or rhonchi appreciated.  ABDOMEN:  Softly distended, +mild tenderness to palpation in RUQ and epigastric area. No rebound tenderness or guarding.  SKIN:  No jaundice, pallor, or cyanosis  NEURO:  A&O x 3    LABS:                     14.3   12.44 )-----------( 216      ( 01 Jul 2022 11:20 )             41.4     07-01    140  |  109<H>  |  18  ----------------------------<  134<H>  4.6   |  24  |  1.10    Ca    9.3      01 Jul 2022 11:20    TPro  7.5  /  Alb  4.0  /  TBili  1.1  /  DBili  x   /  AST  32  /  ALT  59  /  AlkPhos  62  07-01    LIVER FUNCTIONS - ( 01 Jul 2022 11:20 )  Alb: 4.0 g/dL / Pro: 7.5 g/dL / ALK PHOS: 62 U/L / ALT: 59 U/L / AST: 32 U/L / GGT: x           RADIOLOGY & ADDITIONAL STUDIES:  < from: CT Abdomen and Pelvis w/ IV Cont (07.01.22 @ 12:47) >  IMPRESSION:  Cholelithiasis. Pericholecystic inflammation concerning for acute   cholecystitis. Correlate with right upper quadrant ultrasound.    Nonobstructive left nephrolithiasis.

## 2022-07-01 NOTE — H&P ADULT - NSHPREVIEWOFSYSTEMS_GEN_ALL_CORE
CONSTITUTIONAL: No weakness or fevers.  EYES/ENT: No visual changes;  No vertigo or throat pain   NECK: No pain or stiffness  RESPIRATORY: No cough, wheezing, hemoptysis; No shortness of breath  CARDIOVASCULAR: No chest pain or palpitations  GASTROINTESTINAL: See HPI. No vomiting, or hematemesis; No diarrhea or constipation. No melena or hematochezia.  GENITOURINARY: No dysuria, frequency or hematuria  NEUROLOGICAL: No numbness or weakness  SKIN: No itching, burning, rashes, or lesions   All other review of systems is negative unless indicated above.

## 2022-07-01 NOTE — H&P ADULT - NS ATTEND AMEND GEN_ALL_CORE FT
67 yo male h/o biliary colic c/o RUQ abdominal pain since last night after eating cheese and ice cream, pain 9/10, nausea, no vomiting. EGD done last year on PPI. patient had Rt knee surgery 3 years ago. Cardiac stents x4 2018 plavix x 1 year. RUQ Us showed gallstone in neck of gallbladder, cholecystitis. WBC 12,000, Normal Lfts  PMH: Cardiac stents  PSH: RT knee surgery  SH: non smoker  NKDA  PE: Abd RUQ tenderness    67 yo male with acute cholecystitis  -Will take him to OR for lap cholecystectomy/poss open. Risk/benefit of surgery explained to patient including but not limited to intrabdominal abscess, cystic duct leak, wound infection, bleeding, DVT, PE, MI  -Cardiology clearance  -NPO  -IV Abx

## 2022-07-01 NOTE — DISCHARGE NOTE PROVIDER - NSDCMRMEDTOKEN_GEN_ALL_CORE_FT
amLODIPine 5 mg oral tablet: 1 tab(s) orally once a day  aspirin 81 mg oral tablet: 1 tab(s) orally once a day  atorvastatin 40 mg oral tablet: 1 tab(s) orally once a day (at bedtime)  home/hospital  famotidine 40 mg oral tablet: 1 tab(s) orally once a day (at bedtime)  lisinopril 40 mg oral tablet: 1 tab(s) orally once a day   amLODIPine 5 mg oral tablet: 1 tab(s) orally once a day  aspirin 81 mg oral tablet: 1 tab(s) orally once a day  atorvastatin 40 mg oral tablet: 1 tab(s) orally once a day (at bedtime)  home/hospital  famotidine 40 mg oral tablet: 1 tab(s) orally once a day (at bedtime)  ibuprofen 600 mg oral tablet: Please take 1 tab(s) orally every 6 hours, As Needed for pain after surgery. Take with food and water to avoid stomach upset.  lisinopril 40 mg oral tablet: 1 tab(s) orally once a day  oxycodone-acetaminophen 5 mg-325 mg oral tablet: Please take 1 tab(s) orally every 6 hours as needed for pain despiteuse of over the counter medications. Please note this product contains acetaminophen (Tylenol). DO NOT exceed the maximum daily dose of acetaminophen of 4000mg. MDD:4  Tylenol 500 mg oral tablet: Please take 2 tab(s) orally every 6 hours as needed for pain after surgery. Please DO NOT exceed the maximum daily dose of Tylenol of 4000 mg.

## 2022-07-01 NOTE — ED ADULT TRIAGE NOTE - CHIEF COMPLAINT QUOTE
went out to dinner and woke up 2am with severe abdominal pain (across epi gastric region) excessive burping and dry heaves.  significant bm at 2am with 2-3 smaller ones since then.  I went to urgent care and I was given zofran with some improvement.  (patient had similar complaints 3-4 mos ago and was seen by GI and was given Famotodine- which he no longer takes)

## 2022-07-01 NOTE — ED PROVIDER NOTE - CLINICAL SUMMARY MEDICAL DECISION MAKING FREE TEXT BOX
65 yo male HTN, HLD, CAD, gout, fatty liver, kidney stones hx who has stents x 4 no mi, developed sudden severe pain in epigastrium last isaac afetr fatty meal the pain was severe today so he came toer  he has been having intermittent abd pain over past 2 years never like this  on eval  wd wn male who after medication appears well comfortable no distress  heent ncat mmm perrrla eomi anicteric sclera no g f r   cor rrr chest cta  abd tender to palp ruq and across upper abd no rebound no cvat  ext normal  neuro skin normal  plan ct labs ro pancreatitis ro gb ro appy ro acute abd  pain control surgical consultation admission for acute choley

## 2022-07-01 NOTE — H&P ADULT - NSHPPHYSICALEXAM_GEN_ALL_CORE
GENERAL:  Well-nourished, well-developed male lying comfortably in bed in NAD.  HEENT:  NC/AT. Sclera white. Mucous membranes moist.  CARDIO:  Regular rate and rhythm.  No murmur, gallop or rub appreciated.  RESPIRATORY:  Nonlabored breathing, no accessory muscle use. Lungs clear to auscultation bilaterally.  No wheezing, rales or rhonchi appreciated.  ABDOMEN:  Softly distended, +mild tenderness to palpation in RUQ and epigastric area. No rebound tenderness or guarding.  SKIN:  No jaundice, pallor, or cyanosis  NEURO:  A&O x 3

## 2022-07-01 NOTE — H&P ADULT - HISTORY OF PRESENT ILLNESS
66 year old male with PMH CAD s/p 4 stents, HTN, HLD, hx nephrolithiasis, presents with 9/10 upper abdominal pain, nausea, dry heaving without vomiting, and bloating since 02:00 this AM. Also endorses a brief episode of chills. Last ate halibut with rice and feta cheese last night around 20:00. He had a large loose nonbloody BM at 02:00, followed by 3 smaller unremarkable ones. He admits to passing a lot of flatus. He reports similar, less severe episodes every 4-5 months for the past 2 years, went for endoscopy with Dr. Harry Del Cid in July 2021, which showed mild gastritis and patient was put on pepcid (has since been tapering). Patient denies any fever, chest pain, shortness of breath, vomiting, hematemesis, diarrhea, melena, or hematochezia.

## 2022-07-01 NOTE — CONSULT NOTE ADULT - ASSESSMENT
66 year old male with PMH CAD s/p 4 stents, HTN, HLD, hx nephrolithiasis, with cholecystitis, r/o acute cholecystitis. 66 year old male with PMH CAD s/p 4 stents, HTN, HLD, hx nephrolithiasis, with cholelithiasis, r/o acute cholecystitis.

## 2022-07-01 NOTE — DISCHARGE NOTE PROVIDER - CARE PROVIDER_API CALL
Jacob Zepeda)  ColonRectal Surgery; Surgery  [address] [place] [address]  Phone: [phone]  Fax: [phone]  Follow Up Time:

## 2022-07-01 NOTE — H&P ADULT - PROBLEM SELECTOR PLAN 1
- NPO, IV fluids  - Zosyn  - Cardio called for clearance  - OR for laparoscopic cholecystectomy  - Discussed with Dr. Zepeda

## 2022-07-01 NOTE — ED ADULT NURSE NOTE - OBJECTIVE STATEMENT
Pt c/o abd pain, dry heaves, chills started this morning around 2am. Pt was seen in UC, given po zofran, states heaves getting better. Denies fever, chest pain, vomiting, diarrhea. Family at bedside. safety maintained, call bell within reach. Nursing care ongoing.

## 2022-07-01 NOTE — CONSULT NOTE ADULT - ASSESSMENT
NOTE IN PROGRESS    66 year old male with PMH CAD s/p 4 stents, HTN, HLD, hx nephrolithiasis, with cholelithiasis, acute cholecystitis. Cardiology consulted for pre-op clearance for laparoscopic cholecystectomy.    CAD, HTN, HLD  - Denies chest pain, palpitation, SOB, syncope, dizziness, lightheadedness, orthopnea, PND, LANGFORD and edema  - EKG demonstrates NSR. No acute ischemic changes noted.   - Chest X-ray pending  - TTE 4/23/22 normal LV and RV systolic function, no segmental wall abnormalities                 66 year old male with PMH CAD s/p 4 stents, HTN, HLD, hx nephrolithiasis, with cholelithiasis, acute cholecystitis. Cardiology consulted for pre-op clearance for laparoscopic cholecystectomy.    CAD, HTN, HLD  - Denies chest pain, palpitation, SOB, syncope, dizziness, lightheadedness, orthopnea, PND, LANGFORD and edema  - EKG demonstrates NSR. No acute ischemic changes noted.   - Chest X-ray pending  - TTE 4/23/22 normal LV and RV systolic function, no segmental wall abnormalities  - BP controlled, continue Amlodipine 5mg and Lisinopril, can increase Lisinopril if BP is elevated  - continue Aspirin  - Pt has no active ischemia, decompensated heart failure, unstable arrythmia, or severe stenotic valvular disease. Patient is optimized from cardiovascular standpoint to proceed with planned procedure with routine hemodynamic monitoring.   - Monitor and replete lytes, keep K>4, Mg>2.  - Will continue to follow.    Emely Kothari NP  Nurse Practitioner- Cardiology   Spectra #[phone]                             66 year old male with PMH CAD s/p 4 stents (2018, on asa), HTN, HLD, hx nephrolithiasis, with cholelithiasis, acute cholecystitis. Cardiology consulted for pre-op clearance for laparoscopic cholecystectomy.    CAD, HTN, HLD  - Denies chest pain, palpitation, SOB, syncope, dizziness, lightheadedness, orthopnea, PND, LANGFORD and edema  - EKG demonstrates NSR. No acute ischemic changes noted.   - Chest X-ray pending  - TTE 4/23/22 normal LV and RV systolic function, no segmental wall abnormalities  - BP controlled, continue Amlodipine 5mg and Lisinopril, can increase Lisinopril if BP is elevated  - continue Aspirin  - Pt has no active ischemia, decompensated heart failure, unstable arrythmia, or severe stenotic valvular disease. Patient is optimized from cardiovascular standpoint to proceed with planned procedure with routine hemodynamic monitoring.   - Monitor and replete lytes, keep K>4, Mg>2.  - Will continue to follow.    Emely Kothari NP  Nurse Practitioner- Cardiology   Spectra #[phone]

## 2022-07-01 NOTE — ED PROVIDER NOTE - ATTENDING APP SHARED VISIT CONTRIBUTION OF CARE
67 yo male HTN, HLD, CAD, gout, fatty liver, kidney stones hx who has stents x 4 no mi, developed sudden severe pain in epigastrium last isaac afetr fatty meal the pain was severe today so he came toer  he has been having intermittent abd pain over past 2 years never like this  on eval  wd wn male who after medication appears well comfortable no distress  heent ncat mmm perrrla eomi anicteric sclera no g f r   cor rrr chest cta  abd tender to palp ruq and across upper abd no rebound no cvat  ext normal  neuro skin normal  plan ct labs ro pancreatitis ro gb ro appy ro acute abd  pain control surgical consultation admission for acute choley

## 2022-07-02 ENCOUNTER — TRANSCRIPTION ENCOUNTER (OUTPATIENT)
Age: 67
End: 2022-07-02

## 2022-07-02 VITALS
DIASTOLIC BLOOD PRESSURE: 58 MMHG | OXYGEN SATURATION: 94 % | HEART RATE: 60 BPM | TEMPERATURE: 99 F | RESPIRATION RATE: 17 BRPM | SYSTOLIC BLOOD PRESSURE: 117 MMHG

## 2022-07-02 LAB
ALBUMIN SERPL ELPH-MCNC: 3.4 G/DL — SIGNIFICANT CHANGE UP (ref 3.3–5)
ALBUMIN SERPL ELPH-MCNC: 3.7 G/DL — SIGNIFICANT CHANGE UP (ref 3.3–5)
ALP SERPL-CCNC: 49 U/L — SIGNIFICANT CHANGE UP (ref 40–120)
ALP SERPL-CCNC: 58 U/L — SIGNIFICANT CHANGE UP (ref 40–120)
ALT FLD-CCNC: 71 U/L — SIGNIFICANT CHANGE UP (ref 12–78)
ALT FLD-CCNC: 77 U/L — SIGNIFICANT CHANGE UP (ref 12–78)
ANION GAP SERPL CALC-SCNC: 3 MMOL/L — LOW (ref 5–17)
ANION GAP SERPL CALC-SCNC: 9 MMOL/L — SIGNIFICANT CHANGE UP (ref 5–17)
AST SERPL-CCNC: 34 U/L — SIGNIFICANT CHANGE UP (ref 15–37)
AST SERPL-CCNC: 36 U/L — SIGNIFICANT CHANGE UP (ref 15–37)
BASOPHILS # BLD AUTO: 0.02 K/UL — SIGNIFICANT CHANGE UP (ref 0–0.2)
BASOPHILS NFR BLD AUTO: 0.2 % — SIGNIFICANT CHANGE UP (ref 0–2)
BILIRUB SERPL-MCNC: 1.1 MG/DL — SIGNIFICANT CHANGE UP (ref 0.2–1.2)
BILIRUB SERPL-MCNC: 1.5 MG/DL — HIGH (ref 0.2–1.2)
BUN SERPL-MCNC: 18 MG/DL — SIGNIFICANT CHANGE UP (ref 7–23)
BUN SERPL-MCNC: 18 MG/DL — SIGNIFICANT CHANGE UP (ref 7–23)
CALCIUM SERPL-MCNC: 8.2 MG/DL — LOW (ref 8.5–10.1)
CALCIUM SERPL-MCNC: 8.5 MG/DL — SIGNIFICANT CHANGE UP (ref 8.5–10.1)
CHLORIDE SERPL-SCNC: 105 MMOL/L — SIGNIFICANT CHANGE UP (ref 96–108)
CHLORIDE SERPL-SCNC: 108 MMOL/L — SIGNIFICANT CHANGE UP (ref 96–108)
CO2 SERPL-SCNC: 25 MMOL/L — SIGNIFICANT CHANGE UP (ref 22–31)
CO2 SERPL-SCNC: 29 MMOL/L — SIGNIFICANT CHANGE UP (ref 22–31)
CREAT SERPL-MCNC: 1.2 MG/DL — SIGNIFICANT CHANGE UP (ref 0.5–1.3)
CREAT SERPL-MCNC: 1.5 MG/DL — HIGH (ref 0.5–1.3)
EGFR: 52 ML/MIN/1.73M2 — LOW
EGFR: 68 ML/MIN/1.73M2 — SIGNIFICANT CHANGE UP
EOSINOPHIL # BLD AUTO: 0 K/UL — SIGNIFICANT CHANGE UP (ref 0–0.5)
EOSINOPHIL NFR BLD AUTO: 0 % — SIGNIFICANT CHANGE UP (ref 0–6)
GLUCOSE SERPL-MCNC: 107 MG/DL — HIGH (ref 70–99)
GLUCOSE SERPL-MCNC: 137 MG/DL — HIGH (ref 70–99)
HCT VFR BLD CALC: 39.9 % — SIGNIFICANT CHANGE UP (ref 39–50)
HGB BLD-MCNC: 13.5 G/DL — SIGNIFICANT CHANGE UP (ref 13–17)
IMM GRANULOCYTES NFR BLD AUTO: 0.5 % — SIGNIFICANT CHANGE UP (ref 0–1.5)
LYMPHOCYTES # BLD AUTO: 0.74 K/UL — LOW (ref 1–3.3)
LYMPHOCYTES # BLD AUTO: 6.5 % — LOW (ref 13–44)
MCHC RBC-ENTMCNC: 30.8 PG — SIGNIFICANT CHANGE UP (ref 27–34)
MCHC RBC-ENTMCNC: 33.8 GM/DL — SIGNIFICANT CHANGE UP (ref 32–36)
MCV RBC AUTO: 90.9 FL — SIGNIFICANT CHANGE UP (ref 80–100)
MONOCYTES # BLD AUTO: 0.38 K/UL — SIGNIFICANT CHANGE UP (ref 0–0.9)
MONOCYTES NFR BLD AUTO: 3.3 % — SIGNIFICANT CHANGE UP (ref 2–14)
NEUTROPHILS # BLD AUTO: 10.24 K/UL — HIGH (ref 1.8–7.4)
NEUTROPHILS NFR BLD AUTO: 89.5 % — HIGH (ref 43–77)
NRBC # BLD: 0 /100 WBCS — SIGNIFICANT CHANGE UP (ref 0–0)
PLATELET # BLD AUTO: 218 K/UL — SIGNIFICANT CHANGE UP (ref 150–400)
POTASSIUM SERPL-MCNC: 4.4 MMOL/L — SIGNIFICANT CHANGE UP (ref 3.5–5.3)
POTASSIUM SERPL-MCNC: 4.6 MMOL/L — SIGNIFICANT CHANGE UP (ref 3.5–5.3)
POTASSIUM SERPL-SCNC: 4.4 MMOL/L — SIGNIFICANT CHANGE UP (ref 3.5–5.3)
POTASSIUM SERPL-SCNC: 4.6 MMOL/L — SIGNIFICANT CHANGE UP (ref 3.5–5.3)
PROT SERPL-MCNC: 6.6 G/DL — SIGNIFICANT CHANGE UP (ref 6–8.3)
PROT SERPL-MCNC: 6.9 G/DL — SIGNIFICANT CHANGE UP (ref 6–8.3)
RBC # BLD: 4.39 M/UL — SIGNIFICANT CHANGE UP (ref 4.2–5.8)
RBC # FLD: 12.9 % — SIGNIFICANT CHANGE UP (ref 10.3–14.5)
SODIUM SERPL-SCNC: 139 MMOL/L — SIGNIFICANT CHANGE UP (ref 135–145)
SODIUM SERPL-SCNC: 140 MMOL/L — SIGNIFICANT CHANGE UP (ref 135–145)
WBC # BLD: 11.44 K/UL — HIGH (ref 3.8–10.5)
WBC # FLD AUTO: 11.44 K/UL — HIGH (ref 3.8–10.5)

## 2022-07-02 PROCEDURE — 88304 TISSUE EXAM BY PATHOLOGIST: CPT

## 2022-07-02 PROCEDURE — 81001 URINALYSIS AUTO W/SCOPE: CPT

## 2022-07-02 PROCEDURE — 83690 ASSAY OF LIPASE: CPT

## 2022-07-02 PROCEDURE — 36415 COLL VENOUS BLD VENIPUNCTURE: CPT

## 2022-07-02 PROCEDURE — 85610 PROTHROMBIN TIME: CPT

## 2022-07-02 PROCEDURE — 80053 COMPREHEN METABOLIC PANEL: CPT

## 2022-07-02 PROCEDURE — 85730 THROMBOPLASTIN TIME PARTIAL: CPT

## 2022-07-02 PROCEDURE — 99232 SBSQ HOSP IP/OBS MODERATE 35: CPT

## 2022-07-02 PROCEDURE — 96374 THER/PROPH/DIAG INJ IV PUSH: CPT

## 2022-07-02 PROCEDURE — 86850 RBC ANTIBODY SCREEN: CPT

## 2022-07-02 PROCEDURE — 93005 ELECTROCARDIOGRAM TRACING: CPT

## 2022-07-02 PROCEDURE — 76705 ECHO EXAM OF ABDOMEN: CPT

## 2022-07-02 PROCEDURE — 74177 CT ABD & PELVIS W/CONTRAST: CPT | Mod: MA

## 2022-07-02 PROCEDURE — 99285 EMERGENCY DEPT VISIT HI MDM: CPT

## 2022-07-02 PROCEDURE — 84484 ASSAY OF TROPONIN QUANT: CPT

## 2022-07-02 PROCEDURE — 87635 SARS-COV-2 COVID-19 AMP PRB: CPT

## 2022-07-02 PROCEDURE — 86901 BLOOD TYPING SEROLOGIC RH(D): CPT

## 2022-07-02 PROCEDURE — C1889: CPT

## 2022-07-02 PROCEDURE — 86900 BLOOD TYPING SEROLOGIC ABO: CPT

## 2022-07-02 PROCEDURE — 96375 TX/PRO/DX INJ NEW DRUG ADDON: CPT

## 2022-07-02 PROCEDURE — 85025 COMPLETE CBC W/AUTO DIFF WBC: CPT

## 2022-07-02 RX ORDER — HEPARIN SODIUM 5000 [USP'U]/ML
5000 INJECTION INTRAVENOUS; SUBCUTANEOUS EVERY 8 HOURS
Refills: 0 | Status: DISCONTINUED | OUTPATIENT
Start: 2022-07-02 | End: 2022-07-02

## 2022-07-02 RX ORDER — IBUPROFEN 200 MG
1 TABLET ORAL
Qty: 0 | Refills: 0 | DISCHARGE

## 2022-07-02 RX ORDER — SODIUM CHLORIDE 9 MG/ML
1000 INJECTION INTRAMUSCULAR; INTRAVENOUS; SUBCUTANEOUS ONCE
Refills: 0 | Status: COMPLETED | OUTPATIENT
Start: 2022-07-02 | End: 2022-07-02

## 2022-07-02 RX ORDER — SODIUM CHLORIDE 9 MG/ML
1000 INJECTION, SOLUTION INTRAVENOUS
Refills: 0 | Status: DISCONTINUED | OUTPATIENT
Start: 2022-07-02 | End: 2022-07-02

## 2022-07-02 RX ORDER — SODIUM CHLORIDE 9 MG/ML
1000 INJECTION INTRAMUSCULAR; INTRAVENOUS; SUBCUTANEOUS
Refills: 0 | Status: DISCONTINUED | OUTPATIENT
Start: 2022-07-02 | End: 2022-07-02

## 2022-07-02 RX ORDER — ACETAMINOPHEN 500 MG
2 TABLET ORAL
Qty: 0 | Refills: 0 | DISCHARGE

## 2022-07-02 RX ADMIN — AMLODIPINE BESYLATE 5 MILLIGRAM(S): 2.5 TABLET ORAL at 05:59

## 2022-07-02 RX ADMIN — OXYCODONE AND ACETAMINOPHEN 1 TABLET(S): 5; 325 TABLET ORAL at 09:43

## 2022-07-02 RX ADMIN — OXYCODONE AND ACETAMINOPHEN 1 TABLET(S): 5; 325 TABLET ORAL at 10:40

## 2022-07-02 RX ADMIN — SODIUM CHLORIDE 125 MILLILITER(S): 9 INJECTION INTRAMUSCULAR; INTRAVENOUS; SUBCUTANEOUS at 10:40

## 2022-07-02 RX ADMIN — PIPERACILLIN AND TAZOBACTAM 25 GRAM(S): 4; .5 INJECTION, POWDER, LYOPHILIZED, FOR SOLUTION INTRAVENOUS at 00:55

## 2022-07-02 RX ADMIN — HEPARIN SODIUM 5000 UNIT(S): 5000 INJECTION INTRAVENOUS; SUBCUTANEOUS at 14:06

## 2022-07-02 RX ADMIN — LISINOPRIL 40 MILLIGRAM(S): 2.5 TABLET ORAL at 05:59

## 2022-07-02 RX ADMIN — SODIUM CHLORIDE 1000 MILLILITER(S): 9 INJECTION INTRAMUSCULAR; INTRAVENOUS; SUBCUTANEOUS at 09:32

## 2022-07-02 RX ADMIN — PIPERACILLIN AND TAZOBACTAM 25 GRAM(S): 4; .5 INJECTION, POWDER, LYOPHILIZED, FOR SOLUTION INTRAVENOUS at 08:03

## 2022-07-02 NOTE — DISCHARGE NOTE NURSING/CASE MANAGEMENT/SOCIAL WORK - NSDCPEFALRISK_GEN_ALL_CORE
For information on Fall & Injury Prevention, visit: [email or web address] OR  [email or web address] OR  https://www.cdc.gov/steadi/patient.html

## 2022-07-02 NOTE — PROGRESS NOTE ADULT - ASSESSMENT
66 year old male with PMH CAD s/p 4 stents (2018, on asa), HTN, HLD, hx nephrolithiasis, with cholelithiasis, acute cholecystitis. Cardiology consulted for pre-op clearance for laparoscopic cholecystectomy.    CAD, HTN, HLD    -there is no evidence of acute ischemia.  -known cad s/p remote pci  -cont asa  -there is no evidence of significant arrhythmia.  -there is no evidence for meaningful  volume overload.  -mild bret, getting ivf  -bp labile, last reading not terrible  -if creatinine continues to worsen may need to hold ace  -if another agent is needed for bp would consider coreg 3/125 bid  -DVT prophylaxis  -monitor electrolytes, keep k>4, Mg>2  -cardiovascular status is otherwise without acute change.

## 2022-07-02 NOTE — PROGRESS NOTE ADULT - SUBJECTIVE AND OBJECTIVE BOX
Post Operative Note  Patient: MODE ROSS 66y (1955) Male   MRN: [id number]  Location: [address]  Visit: 07-01-22 Inpatient  Date: 07-02-22 @ 00:20    Procedure: S/P sherrill garciae    Subjective:   Patient seen and examined at bedside.  No events post-operatively.  Patient with no new complaints at this time besides mild post operative pain, denies flatus and bowel movement. Admits to voiding post-operatively.  Patient denies any fevers, chills, chest pain, shortness of breath,  nausea, vomiting or diarrhea.    Objective:  Vitals: T(F): 97.7 (07-01-22 @ 21:32), Max: 99 (07-01-22 @ 16:03)  HR: 78 (07-01-22 @ 21:32)  BP: 163/75 (07-01-22 @ 21:32) (134/82 - 192/84)  RR: 16 (07-01-22 @ 21:32)  SpO2: 91% (07-01-22 @ 21:32)  Vent Settings:     In:   IV Fluids:     PHYSICAL EXAM:  GENERAL: No acute distress, well-developed  HEAD:  Atraumatic, Normocephalic  ABDOMEN: Soft, appropriately-tender, minimally-distended; incisions clean dry and intact.  NEUROLOGY: A&O x 3, no focal deficits    Medications: [Standing]  amLODIPine   Tablet 5 milliGRAM(s) Oral daily  lisinopril 40 milliGRAM(s) Oral daily  morphine  - Injectable 4 milliGRAM(s) IV Push every 4 hours PRN  oxycodone    5 mG/acetaminophen 325 mG 1 Tablet(s) Oral every 4 hours PRN    Medications: [PRN]  amLODIPine   Tablet 5 milliGRAM(s) Oral daily  lisinopril 40 milliGRAM(s) Oral daily  morphine  - Injectable 4 milliGRAM(s) IV Push every 4 hours PRN  oxycodone    5 mG/acetaminophen 325 mG 1 Tablet(s) Oral every 4 hours PRN    Labs:                        14.3   12.44 )-----------( 216      ( 01 Jul 2022 11:20 )             41.4     07-01    140  |  109<H>  |  18  ----------------------------<  134<H>  4.6   |  24  |  1.10    Ca    9.3      01 Jul 2022 11:20    TPro  7.5  /  Alb  4.0  /  TBili  1.1  /  DBili  x   /  AST  32  /  ALT  59  /  AlkPhos  62  07-01    PT/INR - ( 01 Jul 2022 11:20 )   PT: 11.8 sec;   INR: 1.01 ratio         PTT - ( 01 Jul 2022 11:20 )  PTT:26.9 sec      Imaging:  No post-op imaging studies    Assessment:  66yMale patient S/P lap narciso     Plan:  - incentive spirometer  - pain control  - OOB  - adv diet as melvin  - serial abdominal exams  - labs in am  - d/c in AM if labs appropriate    Surgical Team Contact Information  Spectralink: Ext: [phone] or [phone]  Pager: [phone]    Date/Time: 07-02-22 @ 00:20  
[facility]    Morro Og, Romeo, Pablo, Geronimo, Paul, Donna      [phone]    CHIEF COMPLAINT: Patient is a 66y old  Male who presents with a chief complaint of Cholecystitis (02 Jul 2022 05:51)      Follow Up: cad, s/p narciso    Interim history: s/p narciso. bret noted with creat 1.5 getting ivf. abd pain this am, not terrible, no cp or sob    MEDICATIONS  (STANDING):  amLODIPine   Tablet 5 milliGRAM(s) Oral daily  heparin   Injectable 5000 Unit(s) SubCutaneous every 8 hours  lisinopril 40 milliGRAM(s) Oral daily  piperacillin/tazobactam IVPB.. 3.375 Gram(s) IV Intermittent every 8 hours  sodium chloride 0.9%. 1000 milliLiter(s) (125 mL/Hr) IV Continuous <Continuous>    MEDICATIONS  (PRN):  morphine  - Injectable 4 milliGRAM(s) IV Push every 4 hours PRN Severe Pain (7 - 10)  oxycodone    5 mG/acetaminophen 325 mG 1 Tablet(s) Oral every 4 hours PRN Mild Pain (1 - 3)      REVIEW OF SYSTEMS:  eye, ent, GI, , allergic, dermatologic, musculoskeletal and neurologic are negative except as described above    Vital Signs Last 24 Hrs  T(C): 36.7 (02 Jul 2022 05:39), Max: 37.2 (01 Jul 2022 16:03)  T(F): 98.1 (02 Jul 2022 05:39), Max: 99 (01 Jul 2022 16:03)  HR: 84 (02 Jul 2022 05:39) (75 - 92)  BP: 141/71 (02 Jul 2022 05:39) (141/71 - 192/84)  BP(mean): --  RR: 17 (02 Jul 2022 05:39) (11 - 18)  SpO2: 94% (02 Jul 2022 09:20) (90% - 100%)    I&O's Summary    01 Jul 2022 07:01  -  02 Jul 2022 07:00  --------------------------------------------------------  IN: 100 mL / OUT: 550 mL / NET: -450 mL    02 Jul 2022 07:01  -  02 Jul 2022 11:22  --------------------------------------------------------  IN: 1000 mL / OUT: 1000 mL / NET: 0 mL        Telemetry past 24h:    PHYSICAL EXAM:    Constitutional: well-nourished, well-developed, NAD   HEENT:  MMM, sclerae anicteric, conjunctivae clear, no oral cyanosis.  Pulmonary: Non-labored, breath sounds are clear bilaterally, No wheezing, rales or rhonchi  Cardiovascular: Regular, S1 and S2.  No murmur.  No rubs, gallops or clicks  Gastrointestinal: Bowel Sounds present, soft, nontender.   Lymph: No peripheral edema.   Neurological: Alert, no focal deficits  Skin: No rashes.  Psych:  Mood & affect appropriate    LABS: All Labs Reviewed:                        13.5   11.44 )-----------( 218      ( 02 Jul 2022 06:40 )             39.9                         14.3   12.44 )-----------( 216      ( 01 Jul 2022 11:20 )             41.4     02 Jul 2022 06:40    139    |  105    |  18     ----------------------------<  137    4.6     |  25     |  1.50   01 Jul 2022 11:20    140    |  109    |  18     ----------------------------<  134    4.6     |  24     |  1.10     Ca    8.5        02 Jul 2022 06:40  Ca    9.3        01 Jul 2022 11:20    TPro  6.9    /  Alb  3.7    /  TBili  1.5    /  DBili  x      /  AST  36     /  ALT  77     /  AlkPhos  58     02 Jul 2022 06:40  TPro  7.5    /  Alb  4.0    /  TBili  1.1    /  DBili  x      /  AST  32     /  ALT  59     /  AlkPhos  62     01 Jul 2022 11:20    PT/INR - ( 01 Jul 2022 11:20 )   PT: 11.8 sec;   INR: 1.01 ratio         PTT - ( 01 Jul 2022 11:20 )  PTT:26.9 sec      Blood Culture:         RADIOLOGY:    EKG:    Echo:    
STATUS POST:  sherrill stuart    POST OPERATIVE DAY #: 1    SUBJECTIVE:  Patient seen and examined at bedside.  No overnight events.  Patient with no new complaints at this time, awating AM diet.  Patient denies any fevers, chills, chest pain, shortness of breath, nausea, vomiting or diarrhea.    Vital Signs Last 24 Hrs  T(C): 36.7 (2022 05:39), Max: 37.2 (2022 16:03)  T(F): 98.1 (2022 05:39), Max: 99 (2022 16:03)  HR: 84 (2022 05:39) (75 - 92)  BP: 141/71 (2022 05:39) (134/82 - 192/84)  BP(mean): --  RR: 17 (2022 05:39) (11 - 18)  SpO2: 90% (2022 05:39) (90% - 100%)    PHYSICAL EXAM:  GENERAL: No acute distress, well-developed  HEAD:  Atraumatic, Normocephalic  ABDOMEN: Soft, minimally-tender, mildly-distended; incisions with band-aids c/d/i.  NEUROLOGY: A&O x 3, no focal deficits    I&O's Summary    2022 07:  -  2022 05:51  --------------------------------------------------------  IN: 0 mL / OUT: 550 mL / NET: -550 mL      I&O's Detail    2022 07:01  -  2022 05:51  --------------------------------------------------------  IN:  Total IN: 0 mL    OUT:    Voided (mL): 550 mL  Total OUT: 550 mL    Total NET: -550 mL        MEDICATIONS  (STANDING):  amLODIPine   Tablet 5 milliGRAM(s) Oral daily  lisinopril 40 milliGRAM(s) Oral daily  piperacillin/tazobactam IVPB.. 3.375 Gram(s) IV Intermittent every 8 hours    MEDICATIONS  (PRN):  morphine  - Injectable 4 milliGRAM(s) IV Push every 4 hours PRN Severe Pain (7 - 10)  oxycodone    5 mG/acetaminophen 325 mG 1 Tablet(s) Oral every 4 hours PRN Mild Pain (1 - 3)    LABS:                        14.3   12.44 )-----------( 216      ( 2022 11:20 )             41.4     07-01    140  |  109<H>  |  18  ----------------------------<  134<H>  4.6   |  24  |  1.10    Ca    9.3      2022 11:20    TPro  7.5  /  Alb  4.0  /  TBili  1.1  /  DBili  x   /  AST  32  /  ALT  59  /  AlkPhos  62  07-01    PT/INR - ( 2022 11:20 )   PT: 11.8 sec;   INR: 1.01 ratio         PTT - ( 2022 11:20 )  PTT:26.9 sec  Urinalysis Basic - ( 2022 13:30 )    Color: Yellow / Appearance: Clear / S.010 / pH: x  Gluc: x / Ketone: Negative  / Bili: Negative / Urobili: Negative   Blood: x / Protein: 15 / Nitrite: Negative   Leuk Esterase: Negative / RBC: 0-2 /HPF / WBC 0-2   Sq Epi: x / Non Sq Epi: Occasional / Bacteria: Occasional      RADIOLOGY & ADDITIONAL STUDIES:

## 2022-07-02 NOTE — PROGRESS NOTE ADULT - NS ATTEND AMEND GEN_ALL_CORE FT
65 yo male s/p lap narciso, doing OK, O2 sat 94% RA, Creat 1.5 T oliva 1.5 WBC 11,000    Abd soft, appropriate tender    -65 yo male ARF s/p lap narciso  -Will hydrate him NS [id number], NS 125ml/hr  -Will repeat labs later. If creat Is better will send him home

## 2022-07-02 NOTE — PROGRESS NOTE ADULT - PROBLEM SELECTOR PLAN 1
- incentive spirometer  - pain control  - OOB  - adv diet as melvin  - serial abdominal exams  - labs in am  - d/c in AM if labs appropriate    Surgical Team Contact Information  Spectralink: Ext: [phone] or [phone]  Pager: [phone]

## 2022-07-02 NOTE — DISCHARGE NOTE NURSING/CASE MANAGEMENT/SOCIAL WORK - NSDCPNINST_GEN_ALL_CORE
Unable to pass gas, nausea , vomiting , fever call your doctor . Shortness of breath or chest pain call 911 and go to the nearest emergency room.

## 2022-07-22 ENCOUNTER — TRANSCRIPTION ENCOUNTER (OUTPATIENT)
Age: 67
End: 2022-07-22

## 2022-10-06 NOTE — ED ADULT NURSE NOTE - NSSISCREENINGQ2_ED_A_ED
History  Chief Complaint   Patient presents with   • Dental Pain     Pt reports Left sided dental pain for a couple months and states he he was told by his dentist to see an oral surgeon     Patient is a 30-year-old male who presents for evaluation of dental pain  Patient states that for the past 6 weeks, he has been experiencing pain in his left upper molar  Patient states that he was seen by his dentist recently, was told he would need to see an oral surgeon  Patient states that he has been unable to make appointment with the oral surgeon  Patient states that he has been taking Aleve for the pain, but is not sure if this is helping  Patient states that he is getting electric shock-like pains in the tooth and the teeth surrounding the molar  Patient states that these pains occur sporadically, not sure what exacerbates them  Patient denies any facial swelling  Denies any fevers, chills, trouble swallowing or trouble breathing  Patient states that he did finish a course of amoxicillin approximately 2 weeks ago  States this did help some  Prior to Admission Medications   Prescriptions Last Dose Informant Patient Reported? Taking?   chlorhexidine (PERIDEX) 0 12 % solution   No No   Sig: Apply 15 mL to the mouth or throat 2 (two) times a day   methadone (DOLOPHINE) 10 mg/mL oral concentrated solution   Yes No   Sig: Take 75 mg by mouth daily    Patient not taking: Reported on 8/19/2022   nicotine (NICODERM CQ) 14 mg/24hr TD 24 hr patch   No No   Sig: Place 1 patch on the skin daily   Patient not taking: Reported on 8/19/2022      Facility-Administered Medications: None       History reviewed  No pertinent past medical history      Past Surgical History:   Procedure Laterality Date   • INCISION AND DRAINAGE OF WOUND Left 06/28/2021    Procedure: INCISION AND DRAINAGE (I&D) LEFT SUBMANDIBULAR SPACE INFECTION, LEFT  SPACE INFECTION WITH EXTRACTION OF TOOTH #19;  Surgeon: Ramya Munroe DMD; Location: [facility];  Service: Maxillofacial   • ORIF TIBIA & FIBULA FRACTURES Right        History reviewed  No pertinent family history  I have reviewed and agree with the history as documented  E-Cigarette/Vaping   • E-Cigarette Use Never User      E-Cigarette/Vaping Substances     Social History     Tobacco Use   • Smoking status: Current Every Day Smoker     Packs/day: 0 50   • Smokeless tobacco: Never Used   Vaping Use   • Vaping Use: Never used   Substance Use Topics   • Alcohol use: Never   • Drug use: Yes     Types: Marijuana        Review of Systems   Constitutional: Negative for chills and fever  HENT: Positive for dental problem  Negative for facial swelling and trouble swallowing  Respiratory: Negative  Cardiovascular: Negative  Gastrointestinal: Negative  Genitourinary: Negative  Musculoskeletal: Negative  Skin: Negative  Neurological: Negative  All other systems reviewed and are negative  Physical Exam  ED Triage Vitals [10/06/22 1545]   Temperature Pulse Respirations Blood Pressure SpO2   98 5 °F (36 9 °C) 60 20 124/84 100 %      Temp Source Heart Rate Source Patient Position - Orthostatic VS BP Location FiO2 (%)   Oral Monitor Sitting Left arm --      Pain Score       --             Orthostatic Vital Signs  Vitals:    10/06/22 1545   BP: 124/84   Pulse: 60   Patient Position - Orthostatic VS: Sitting       Physical Exam  Vitals and nursing note reviewed  Constitutional:       General: He is awake  He is not in acute distress  Appearance: He is not toxic-appearing  HENT:      Head: Normocephalic and atraumatic  Mouth/Throat:      Lips: Pink  Mouth: Mucous membranes are moist       Comments: Large dental jorge l in tooth #16, smaller jorge l in tooth #15  No surrounding swelling or obvious dental abscess  Eyes:      General: Vision grossly intact  Gaze aligned appropriately  Cardiovascular:      Rate and Rhythm: Normal rate and regular rhythm  Pulmonary:      Effort: Pulmonary effort is normal  No respiratory distress  Musculoskeletal:      Cervical back: Full passive range of motion without pain and neck supple  Skin:     General: Skin is warm and dry  Neurological:      General: No focal deficit present  Mental Status: He is alert and oriented to person, place, and time  ED Medications  Medications   ketorolac (TORADOL) injection 15 mg (has no administration in time range)       Diagnostic Studies  Results Reviewed     None                 No orders to display         Procedures  Procedures      ED Course                                       MDM  Number of Diagnoses or Management Options  Dental caries: established and worsening  Dentalgia: established and worsening  Diagnosis management comments: 60-year-old male presents for evaluation of dentalgia  On exam, patient with obvious dental caries, no obvious dental abscess  No facial swelling  Vitals within normal limits  Will treat patient symptomatically with Toradol, will give dental clinic follow-up and oral surgery referral   Patient discharged home with symptomatic care instructions and strict ED return precautions  Patient Progress  Patient progress: stable      Disposition  Final diagnoses:   Dental caries   Dentalgia     Time reflects when diagnosis was documented in both MDM as applicable and the Disposition within this note     Time User Action Codes Description Comment    10/6/2022  4:59 PM Ingrid Jacques Add [K02 [id number]] Dental caries     10/6/2022  4:59 PM Ingrid Jacques Add [[id number]] [address] [address]       ED Disposition     ED Disposition   Discharge    Condition   Stable    Date/Time   Thu Oct 6, 2022  4:59 PM    Comment   [facility] discharge to home/self care                 Follow-up Information     Follow up With Specialties Details Why Leslietalisha Arthur Schedule an appointment as soon as possible for a visit in 1 week  [facility] [address]  [address] [place], [place], [address], [address] [facility] [address]  Schedule an appointment as soon as possible for a visit in 1 week  [address] [facility] 16     [address] [place] [address] 34 [facility] Emergency Medicine Go to  If symptoms worsen [address] [id number]  2 [facility] 64 [facility], [address], [place], [address] [facility]          Patient's Medications   Discharge Prescriptions    No medications on file     No discharge procedures on file  PDMP Review     None           ED Provider  Attending physically available and evaluated Ashley Norwood I managed the patient along with the ED Attending      Electronically Signed by         Perez Robb DO  10/06/22 9459 No

## 2022-10-15 NOTE — H&P PST ADULT - GUM GEN PE MLT EXAM PC
Interval History:   Continue current treatment. Awaiting placement.     Review of Systems   Constitutional:  Positive for activity change, appetite change and fatigue. Negative for chills, diaphoresis and fever.   HENT:  Negative for congestion, ear pain, facial swelling and trouble swallowing.    Eyes:  Negative for pain and redness.   Respiratory:  Negative for cough and shortness of breath.    Cardiovascular:  Negative for chest pain, palpitations and leg swelling.   Gastrointestinal:  Negative for abdominal distention, abdominal pain, blood in stool, constipation, diarrhea, nausea and vomiting.   Endocrine: Negative for polydipsia and polyphagia.   Genitourinary:  Negative for difficulty urinating, dysuria, flank pain and hematuria.   Musculoskeletal:  Negative for gait problem.   Skin:  Negative for color change.        BKA incision   Allergic/Immunologic: Negative for food allergies.   Neurological:  Positive for weakness. Negative for facial asymmetry and speech difficulty.   Hematological:  Does not bruise/bleed easily.   Psychiatric/Behavioral:  Negative for agitation, behavioral problems, confusion and suicidal ideas. The patient is not nervous/anxious.         Depressed   Objective:     Vital Signs (Most Recent):  Temp: 97.5 °F (36.4 °C) (10/15/22 1626)  Pulse: 86 (10/15/22 1626)  Resp: 17 (10/15/22 1626)  BP: (!) 145/74 (10/15/22 1626)  SpO2: (!) 94 % (10/15/22 1626)   Vital Signs (24h Range):  Temp:  [97.5 °F (36.4 °C)-98.2 °F (36.8 °C)] 97.5 °F (36.4 °C)  Pulse:  [79-92] 86  Resp:  [14-18] 17  SpO2:  [94 %-97 %] 94 %  BP: (119-169)/(54-79) 145/74     Weight: 96.5 kg (212 lb 11.9 oz)  Body mass index is 29.67 kg/m².    Intake/Output Summary (Last 24 hours) at 10/15/2022 1650  Last data filed at 10/15/2022 1340  Gross per 24 hour   Intake 420 ml   Output 1025 ml   Net -605 ml      Physical Exam  Vitals and nursing note reviewed.   Constitutional:       General: He is awake. He is not in acute distress.      Appearance: He is overweight. He is not ill-appearing, toxic-appearing or diaphoretic.      Comments: Weak   HENT:      Head: Normocephalic and atraumatic.      Mouth/Throat:      Mouth: Mucous membranes are moist.   Eyes:      General: No scleral icterus.        Right eye: No discharge.         Left eye: No discharge.      Extraocular Movements: Extraocular movements intact.      Conjunctiva/sclera: Conjunctivae normal.      Pupils: Pupils are equal, round, and reactive to light.   Neck:      Vascular: No JVD.   Cardiovascular:      Rate and Rhythm: Normal rate and regular rhythm.      Pulses:           Radial pulses are 2+ on the right side and 2+ on the left side.      Heart sounds: Normal heart sounds. No murmur heard.     Comments: Bilateral BKA  Pulmonary:      Effort: Pulmonary effort is normal. No respiratory distress.      Breath sounds: Decreased breath sounds present. No wheezing, rhonchi or rales.   Abdominal:      General: Abdomen is flat. Bowel sounds are normal. There is no distension.      Palpations: Abdomen is soft.      Tenderness: There is no abdominal tenderness. There is no guarding.      Comments:     Genitourinary:     Comments: Not examined    Rectal tube noted  Musculoskeletal:         General: Normal range of motion.      Cervical back: Normal range of motion and neck supple. No rigidity or tenderness.      Right lower leg: No edema.      Left lower leg: No edema.      Comments:  Left BKA MIGUEL with staples intact      Skin:     General: Skin is warm and dry.      Capillary Refill: Capillary refill takes 2 to 3 seconds.      Coloration: Skin is pale.          Neurological:      General: No focal deficit present.      Mental Status: He is alert and oriented to person, place, and time. Mental status is at baseline.      Motor: Weakness present.   Psychiatric:         Attention and Perception: He is attentive.         Mood and Affect: Mood is depressed. Affect is blunt.         Speech: Speech  normal.         Behavior: Behavior normal. Behavior is not agitated or aggressive. Behavior is cooperative.         Thought Content: Thought content normal.          Lab Results   Component Value Date    WBC 6.57 10/15/2022    HGB 8.9 (L) 10/15/2022    HCT 29.6 (L) 10/15/2022    MCV 91 10/15/2022     10/15/2022       BMP  Lab Results   Component Value Date     10/15/2022    K 4.0 10/15/2022     10/15/2022    CO2 23 10/15/2022    BUN 29 (H) 10/15/2022    CREATININE 1.9 (H) 10/15/2022    CALCIUM 8.5 (L) 10/15/2022    ANIONGAP 9 10/15/2022    ESTGFRAFRICA 47 (A) 08/15/2021    EGFRNONAA 41 (A) 08/15/2021        patient refused

## 2022-11-07 NOTE — ED PROVIDER NOTE - BIRTH SEX
[School] : school [Development and Mental Health] : development and mental health [Nutrition and Physical Activity] : nutrition and physical activity [Oral Health] : oral health [Safety] : safety [Full Activity without restrictions including Physical Education & Athletics] : Full Activity without restrictions including Physical Education & Athletics [I have examined the above-named student and completed the preparticipation physical evaluation. The athlete does not present apparent clinical contraindications to practice and participate in sport(s) as outlined above. A copy of the physical exam is on r] : I have examined the above-named student and completed the preparticipation physical evaluation. The athlete does not present apparent clinical contraindications to practice and participate in sport(s) as outlined above. A copy of the physical exam is on record in my office and can be made available to the school at the request of the parents. If conditions arise after the athlete has been cleared for participation, the physician may rescind the clearance until the problem is resolved and the potential consequences are completely explained to the athlete (and parents/guardians). [FreeTextEntry1] : \par - BEHAVIORAL ISSUES. ENCOURAGED TO HAVE NP TESTING IN MARCH. REFERRED TO NEUROLOGY\par - MEATAL STENOSIS, MOM REPORTS INCREASED URINARY FREQUENCY AND DEVIATED STREAM. REFERRED TO UROLOGY . UA AND CULTURE ORDERED \par - ROUTINE LABS \par - GROWTH REVIEWED\par \par AIM FOR 3 VARIED MEALS AND 2-3 HEALTHY SNACKS INCLUDING FRUITS, VEGETABLES, PROTEINS\par LIMIT MILK TO LESS THAN 22 OZ AND JUICE TO LESS THAN 4 OZ PER DAY\par GET 60 MINUTES OF PLAY PER DAY\par LIMIT SCREEN TIME TO < 2 HRS PER DAY\par ENCOURAGE INDEPENDENT SELF CARE FOR ADLS\par SUPERVISE DAILY TOOTH CARE AND SCHEDULE  DENTAL VISIT TWICE A YEAR\par CONTINUE CAR BOOSTER SEAT APPROPRIATE FOR HEIGHT AND WEIGHT AT ALL TIMES EVEN FOR SHORT TRIPS\par SCHEDULE LABS (CBC, CHEM, LIPIDS)\par SCHEDULE YEARLY CHECKUP\par  Male

## 2023-02-24 NOTE — PATIENT PROFILE ADULT. - SURGICAL SITE INCISION
October 25, 2023       Erickson Raymond  [address] [place] [id number]    Dear Mr. Raymond,    Your procedure is scheduled with Mino Laureano MD on November 24, 2023, at [facility]. The start time of your procedure is tentatively scheduled for 7:15 AM. You can expect to be contacted 5 to 7 days prior to the surgery to confirm arrival and surgery time. Occasionally these times may change.    Please arrive to register for your procedure at 5:15 AM. The address of the facility is:      [address]  [address]  [phone]  Please enter through the main doors near the Physician's [place] and check in at Day Surgery Registration.       The following appointment(s) have been scheduled for you:     2 weeks post op with Dr. Laureano at the [facility] on December 6, 2023 at 9:00 AM    Here are instructions for your surgery:     Please have labs, EKG, and a preoperative appointment with your primary care physician  completed 14 to 30 days prior to the surgery date.  Orders have been placed and you may go to any [facility] for these services.     If your surgeon has not given you a special skin cleanser with instructions on how to use at home before your surgery, please call that doctor's office [phone].  (You can use Dial Antibacterial Soap).     Do not eat or drink after midnight the night before your surgery.    You will need someone to drive you home and remain with you up to 24 hours after you have been discharged.     Starting 10 days prior to your surgery, please do not take any Phentermine or other diet/weight loss products.  Continuing these medications in the 10 days before surgery will likely result in postponement due to anesthesia requirements.  Please consult with your prescribing physicians if you have any questions.  Please do not take any aspirin or aspirin products such as 81 mg baby aspirin, Motrin, Ibuprofen,  Advil, Aleve, meloxicam, naproxen, celebrex, Fish Oil or any OTC (over the counter) supplements for 7 days prior to your surgery.    If you need a pain reliever, Tylenol is ok  -  it does not contain aspirin.      If you have any work related and/or disability forms that need to be completed, please bring these forms to:  [facility]. It takes approximately 7 to 10 business days to complete these forms.    If you have any questions or need to reschedule, please contact me at the telephone number and extension listed below.     Thank you,    Sara MYERS [phone]  Surgery Scheduler for Mino Laureano MD   [facility] Pre-Admit Department    Enclosures: [facility] Booklet       no

## 2023-03-16 NOTE — ED ADULT NURSE NOTE - GLASGOW COMA SCALE: SCORE
Mirvaso Counseling: Mirvaso is a topical medication which can decrease superficial blood flow where applied. Side effects are uncommon and include stinging, redness and allergic reactions. 15

## 2023-05-03 ENCOUNTER — NON-APPOINTMENT (OUTPATIENT)
Age: 68
End: 2023-05-03

## 2023-05-03 ENCOUNTER — APPOINTMENT (OUTPATIENT)
Dept: CARDIOLOGY | Facility: CLINIC | Age: 68
End: 2023-05-03
Payer: COMMERCIAL

## 2023-05-03 VITALS — SYSTOLIC BLOOD PRESSURE: 189 MMHG | OXYGEN SATURATION: 97 % | DIASTOLIC BLOOD PRESSURE: 82 MMHG | HEART RATE: 71 BPM

## 2023-05-03 PROCEDURE — 93000 ELECTROCARDIOGRAM COMPLETE: CPT

## 2023-05-03 PROCEDURE — 99214 OFFICE O/P EST MOD 30 MIN: CPT | Mod: 25

## 2023-05-03 RX ORDER — LISINOPRIL 40 MG/1
40 TABLET ORAL DAILY
Qty: 90 | Refills: 3 | Status: COMPLETED | COMMUNITY
Start: 2019-02-25 | End: 2023-05-03

## 2023-05-04 ENCOUNTER — TRANSCRIPTION ENCOUNTER (OUTPATIENT)
Age: 68
End: 2023-05-04

## 2023-05-04 RX ORDER — ASPIRIN ENTERIC COATED TABLETS 81 MG 81 MG/1
81 TABLET, DELAYED RELEASE ORAL
Qty: 90 | Refills: 3 | Status: DISCONTINUED | COMMUNITY
Start: 2018-02-20 | End: 2023-05-04

## 2023-06-06 NOTE — ASSESSMENT
[FreeTextEntry1] : Coronary artery disease (414.00) (I25.10)\par Essential hypertension (401.9) (I10)\par Hypercholesterolemia (272.0) (E78.00)\par Palpitations (785.1) (R00.2

## 2023-06-06 NOTE — DISCUSSION/SUMMARY
[FreeTextEntry1] : 1. Start carvedilol 12.5 mg bid. Continue other anti htn medications.\par 2. Instructed to check daily BP.\par 3. Follow up in 4 months. [EKG obtained to assist in diagnosis and management of assessed problem(s)] : EKG obtained to assist in diagnosis and management of assessed problem(s)

## 2023-06-06 NOTE — HISTORY OF PRESENT ILLNESS
[FreeTextEntry1] : Mr. Prado is a 67 year-old man with known hypertension, hyperlipidemia and CAD with recent cath demonstrating LAD/Diag disease thus had PCI, successfully. He is doing well. He is having a knee cyst removed. He was recently hospitalized for this vague ascending weakness and heat sensation, no syncope and increase in frequency of urination. He is doing well with no active cardiac symptoms. Blood pressure sub-optimal but otherwise no issues reported. \par \par Follow-up 4/12/22 - recently reduced amlodipine dose due to lower limb edema. BP today 185/99mmHg. On questioning patient reports engaging in heavy resistance exercise but limited cardiovascular exercise.\par \par Follow-up 5/3/23 - stable from cardiac standpoint. No anginal symptoms. BP elevated.

## 2023-07-19 ENCOUNTER — RX RENEWAL (OUTPATIENT)
Age: 68
End: 2023-07-19

## 2023-09-20 ENCOUNTER — NON-APPOINTMENT (OUTPATIENT)
Age: 68
End: 2023-09-20

## 2023-09-20 ENCOUNTER — APPOINTMENT (OUTPATIENT)
Dept: CARDIOLOGY | Facility: CLINIC | Age: 68
End: 2023-09-20
Payer: COMMERCIAL

## 2023-09-20 VITALS — SYSTOLIC BLOOD PRESSURE: 186 MMHG | DIASTOLIC BLOOD PRESSURE: 90 MMHG | OXYGEN SATURATION: 98 % | HEART RATE: 54 BPM

## 2023-09-20 PROCEDURE — 99214 OFFICE O/P EST MOD 30 MIN: CPT

## 2023-10-03 NOTE — CONSULT NOTE ADULT - CONSULT REQUESTED DATE/TIME
22-Apr-2022 15:15 Benzoyl Peroxide Pregnancy And Lactation Text: This medication is Pregnancy Category C. It is unknown if benzoyl peroxide is excreted in breast milk.

## 2023-11-13 ENCOUNTER — EMERGENCY (EMERGENCY)
Facility: HOSPITAL | Age: 68
LOS: 1 days | Discharge: HOME | End: 2023-11-13
Attending: EMERGENCY MEDICINE | Admitting: EMERGENCY MEDICINE
Payer: COMMERCIAL

## 2023-11-13 ENCOUNTER — NON-APPOINTMENT (OUTPATIENT)
Age: 68
End: 2023-11-13

## 2023-11-13 ENCOUNTER — TRANSCRIPTION ENCOUNTER (OUTPATIENT)
Age: 68
End: 2023-11-13

## 2023-11-13 VITALS — OXYGEN SATURATION: 98 % | TEMPERATURE: 98 F | RESPIRATION RATE: 18 BRPM

## 2023-11-13 VITALS
OXYGEN SATURATION: 97 % | HEART RATE: 58 BPM | SYSTOLIC BLOOD PRESSURE: 186 MMHG | WEIGHT: 229.94 LBS | DIASTOLIC BLOOD PRESSURE: 94 MMHG | TEMPERATURE: 98 F | RESPIRATION RATE: 16 BRPM | HEIGHT: 60 IN

## 2023-11-13 DIAGNOSIS — I25.10 ATHEROSCLEROTIC HEART DISEASE OF NATIVE CORONARY ARTERY WITHOUT ANGINA PECTORIS: Chronic | ICD-10-CM

## 2023-11-13 LAB
ALBUMIN SERPL ELPH-MCNC: 3.9 G/DL — SIGNIFICANT CHANGE UP (ref 3.3–5)
ALBUMIN SERPL ELPH-MCNC: 3.9 G/DL — SIGNIFICANT CHANGE UP (ref 3.3–5)
ALP SERPL-CCNC: 69 U/L — SIGNIFICANT CHANGE UP (ref 40–120)
ALP SERPL-CCNC: 69 U/L — SIGNIFICANT CHANGE UP (ref 40–120)
ALT FLD-CCNC: 68 U/L — SIGNIFICANT CHANGE UP (ref 12–78)
ALT FLD-CCNC: 68 U/L — SIGNIFICANT CHANGE UP (ref 12–78)
ANION GAP SERPL CALC-SCNC: 6 MMOL/L — SIGNIFICANT CHANGE UP (ref 5–17)
ANION GAP SERPL CALC-SCNC: 6 MMOL/L — SIGNIFICANT CHANGE UP (ref 5–17)
APPEARANCE UR: CLEAR — SIGNIFICANT CHANGE UP
APPEARANCE UR: CLEAR — SIGNIFICANT CHANGE UP
APTT BLD: 31.1 SEC — SIGNIFICANT CHANGE UP (ref 24.5–35.6)
APTT BLD: 31.1 SEC — SIGNIFICANT CHANGE UP (ref 24.5–35.6)
AST SERPL-CCNC: 35 U/L — SIGNIFICANT CHANGE UP (ref 15–37)
AST SERPL-CCNC: 35 U/L — SIGNIFICANT CHANGE UP (ref 15–37)
BASOPHILS # BLD AUTO: 0.06 K/UL — SIGNIFICANT CHANGE UP (ref 0–0.2)
BASOPHILS # BLD AUTO: 0.06 K/UL — SIGNIFICANT CHANGE UP (ref 0–0.2)
BASOPHILS NFR BLD AUTO: 1.1 % — SIGNIFICANT CHANGE UP (ref 0–2)
BASOPHILS NFR BLD AUTO: 1.1 % — SIGNIFICANT CHANGE UP (ref 0–2)
BILIRUB SERPL-MCNC: 1.5 MG/DL — HIGH (ref 0.2–1.2)
BILIRUB SERPL-MCNC: 1.5 MG/DL — HIGH (ref 0.2–1.2)
BILIRUB UR-MCNC: NEGATIVE — SIGNIFICANT CHANGE UP
BILIRUB UR-MCNC: NEGATIVE — SIGNIFICANT CHANGE UP
BUN SERPL-MCNC: 15 MG/DL — SIGNIFICANT CHANGE UP (ref 7–23)
BUN SERPL-MCNC: 15 MG/DL — SIGNIFICANT CHANGE UP (ref 7–23)
CALCIUM SERPL-MCNC: 8.9 MG/DL — SIGNIFICANT CHANGE UP (ref 8.5–10.1)
CALCIUM SERPL-MCNC: 8.9 MG/DL — SIGNIFICANT CHANGE UP (ref 8.5–10.1)
CHLORIDE SERPL-SCNC: 107 MMOL/L — SIGNIFICANT CHANGE UP (ref 96–108)
CHLORIDE SERPL-SCNC: 107 MMOL/L — SIGNIFICANT CHANGE UP (ref 96–108)
CK MB BLD-MCNC: 0.8 % — SIGNIFICANT CHANGE UP (ref 0–3.5)
CK MB BLD-MCNC: 0.8 % — SIGNIFICANT CHANGE UP (ref 0–3.5)
CK MB CFR SERPL CALC: 1 NG/ML — SIGNIFICANT CHANGE UP (ref 0–3.6)
CK MB CFR SERPL CALC: 1 NG/ML — SIGNIFICANT CHANGE UP (ref 0–3.6)
CK MB CFR SERPL CALC: <1 NG/ML — SIGNIFICANT CHANGE UP (ref 0–3.6)
CK MB CFR SERPL CALC: <1 NG/ML — SIGNIFICANT CHANGE UP (ref 0–3.6)
CK SERPL-CCNC: 133 U/L — SIGNIFICANT CHANGE UP (ref 26–308)
CK SERPL-CCNC: 133 U/L — SIGNIFICANT CHANGE UP (ref 26–308)
CO2 SERPL-SCNC: 27 MMOL/L — SIGNIFICANT CHANGE UP (ref 22–31)
CO2 SERPL-SCNC: 27 MMOL/L — SIGNIFICANT CHANGE UP (ref 22–31)
COLOR SPEC: YELLOW — SIGNIFICANT CHANGE UP
COLOR SPEC: YELLOW — SIGNIFICANT CHANGE UP
CREAT SERPL-MCNC: 1.1 MG/DL — SIGNIFICANT CHANGE UP (ref 0.5–1.3)
CREAT SERPL-MCNC: 1.1 MG/DL — SIGNIFICANT CHANGE UP (ref 0.5–1.3)
DIFF PNL FLD: NEGATIVE — SIGNIFICANT CHANGE UP
DIFF PNL FLD: NEGATIVE — SIGNIFICANT CHANGE UP
EGFR: 74 ML/MIN/1.73M2 — SIGNIFICANT CHANGE UP
EGFR: 74 ML/MIN/1.73M2 — SIGNIFICANT CHANGE UP
EOSINOPHIL # BLD AUTO: 0.22 K/UL — SIGNIFICANT CHANGE UP (ref 0–0.5)
EOSINOPHIL # BLD AUTO: 0.22 K/UL — SIGNIFICANT CHANGE UP (ref 0–0.5)
EOSINOPHIL NFR BLD AUTO: 3.9 % — SIGNIFICANT CHANGE UP (ref 0–6)
EOSINOPHIL NFR BLD AUTO: 3.9 % — SIGNIFICANT CHANGE UP (ref 0–6)
GLUCOSE SERPL-MCNC: 126 MG/DL — HIGH (ref 70–99)
GLUCOSE SERPL-MCNC: 126 MG/DL — HIGH (ref 70–99)
GLUCOSE UR QL: NEGATIVE MG/DL — SIGNIFICANT CHANGE UP
GLUCOSE UR QL: NEGATIVE MG/DL — SIGNIFICANT CHANGE UP
HCT VFR BLD CALC: 39.3 % — SIGNIFICANT CHANGE UP (ref 39–50)
HCT VFR BLD CALC: 39.3 % — SIGNIFICANT CHANGE UP (ref 39–50)
HGB BLD-MCNC: 13.8 G/DL — SIGNIFICANT CHANGE UP (ref 13–17)
HGB BLD-MCNC: 13.8 G/DL — SIGNIFICANT CHANGE UP (ref 13–17)
IMM GRANULOCYTES NFR BLD AUTO: 0.4 % — SIGNIFICANT CHANGE UP (ref 0–0.9)
IMM GRANULOCYTES NFR BLD AUTO: 0.4 % — SIGNIFICANT CHANGE UP (ref 0–0.9)
INR BLD: 0.9 RATIO — SIGNIFICANT CHANGE UP (ref 0.85–1.18)
INR BLD: 0.9 RATIO — SIGNIFICANT CHANGE UP (ref 0.85–1.18)
KETONES UR-MCNC: NEGATIVE MG/DL — SIGNIFICANT CHANGE UP
KETONES UR-MCNC: NEGATIVE MG/DL — SIGNIFICANT CHANGE UP
LEUKOCYTE ESTERASE UR-ACNC: NEGATIVE — SIGNIFICANT CHANGE UP
LEUKOCYTE ESTERASE UR-ACNC: NEGATIVE — SIGNIFICANT CHANGE UP
LYMPHOCYTES # BLD AUTO: 1.36 K/UL — SIGNIFICANT CHANGE UP (ref 1–3.3)
LYMPHOCYTES # BLD AUTO: 1.36 K/UL — SIGNIFICANT CHANGE UP (ref 1–3.3)
LYMPHOCYTES # BLD AUTO: 23.8 % — SIGNIFICANT CHANGE UP (ref 13–44)
LYMPHOCYTES # BLD AUTO: 23.8 % — SIGNIFICANT CHANGE UP (ref 13–44)
MAGNESIUM SERPL-MCNC: 2 MG/DL — SIGNIFICANT CHANGE UP (ref 1.6–2.6)
MAGNESIUM SERPL-MCNC: 2 MG/DL — SIGNIFICANT CHANGE UP (ref 1.6–2.6)
MCHC RBC-ENTMCNC: 31 PG — SIGNIFICANT CHANGE UP (ref 27–34)
MCHC RBC-ENTMCNC: 31 PG — SIGNIFICANT CHANGE UP (ref 27–34)
MCHC RBC-ENTMCNC: 35.1 GM/DL — SIGNIFICANT CHANGE UP (ref 32–36)
MCHC RBC-ENTMCNC: 35.1 GM/DL — SIGNIFICANT CHANGE UP (ref 32–36)
MCV RBC AUTO: 88.3 FL — SIGNIFICANT CHANGE UP (ref 80–100)
MCV RBC AUTO: 88.3 FL — SIGNIFICANT CHANGE UP (ref 80–100)
MONOCYTES # BLD AUTO: 0.44 K/UL — SIGNIFICANT CHANGE UP (ref 0–0.9)
MONOCYTES # BLD AUTO: 0.44 K/UL — SIGNIFICANT CHANGE UP (ref 0–0.9)
MONOCYTES NFR BLD AUTO: 7.7 % — SIGNIFICANT CHANGE UP (ref 2–14)
MONOCYTES NFR BLD AUTO: 7.7 % — SIGNIFICANT CHANGE UP (ref 2–14)
NEUTROPHILS # BLD AUTO: 3.61 K/UL — SIGNIFICANT CHANGE UP (ref 1.8–7.4)
NEUTROPHILS # BLD AUTO: 3.61 K/UL — SIGNIFICANT CHANGE UP (ref 1.8–7.4)
NEUTROPHILS NFR BLD AUTO: 63.1 % — SIGNIFICANT CHANGE UP (ref 43–77)
NEUTROPHILS NFR BLD AUTO: 63.1 % — SIGNIFICANT CHANGE UP (ref 43–77)
NITRITE UR-MCNC: NEGATIVE — SIGNIFICANT CHANGE UP
NITRITE UR-MCNC: NEGATIVE — SIGNIFICANT CHANGE UP
NRBC # BLD: 0 /100 WBCS — SIGNIFICANT CHANGE UP (ref 0–0)
NRBC # BLD: 0 /100 WBCS — SIGNIFICANT CHANGE UP (ref 0–0)
NT-PROBNP SERPL-SCNC: 78 PG/ML — SIGNIFICANT CHANGE UP (ref 0–125)
NT-PROBNP SERPL-SCNC: 78 PG/ML — SIGNIFICANT CHANGE UP (ref 0–125)
PH UR: 6 — SIGNIFICANT CHANGE UP (ref 5–8)
PH UR: 6 — SIGNIFICANT CHANGE UP (ref 5–8)
PLATELET # BLD AUTO: 186 K/UL — SIGNIFICANT CHANGE UP (ref 150–400)
PLATELET # BLD AUTO: 186 K/UL — SIGNIFICANT CHANGE UP (ref 150–400)
POTASSIUM SERPL-MCNC: 4.1 MMOL/L — SIGNIFICANT CHANGE UP (ref 3.5–5.3)
POTASSIUM SERPL-MCNC: 4.1 MMOL/L — SIGNIFICANT CHANGE UP (ref 3.5–5.3)
POTASSIUM SERPL-SCNC: 4.1 MMOL/L — SIGNIFICANT CHANGE UP (ref 3.5–5.3)
POTASSIUM SERPL-SCNC: 4.1 MMOL/L — SIGNIFICANT CHANGE UP (ref 3.5–5.3)
PROT SERPL-MCNC: 7.5 G/DL — SIGNIFICANT CHANGE UP (ref 6–8.3)
PROT SERPL-MCNC: 7.5 G/DL — SIGNIFICANT CHANGE UP (ref 6–8.3)
PROT UR-MCNC: NEGATIVE MG/DL — SIGNIFICANT CHANGE UP
PROT UR-MCNC: NEGATIVE MG/DL — SIGNIFICANT CHANGE UP
PROTHROM AB SERPL-ACNC: 10.6 SEC — SIGNIFICANT CHANGE UP (ref 9.5–13)
PROTHROM AB SERPL-ACNC: 10.6 SEC — SIGNIFICANT CHANGE UP (ref 9.5–13)
RBC # BLD: 4.45 M/UL — SIGNIFICANT CHANGE UP (ref 4.2–5.8)
RBC # BLD: 4.45 M/UL — SIGNIFICANT CHANGE UP (ref 4.2–5.8)
RBC # FLD: 12.5 % — SIGNIFICANT CHANGE UP (ref 10.3–14.5)
RBC # FLD: 12.5 % — SIGNIFICANT CHANGE UP (ref 10.3–14.5)
SODIUM SERPL-SCNC: 140 MMOL/L — SIGNIFICANT CHANGE UP (ref 135–145)
SODIUM SERPL-SCNC: 140 MMOL/L — SIGNIFICANT CHANGE UP (ref 135–145)
SP GR SPEC: 1.01 — SIGNIFICANT CHANGE UP (ref 1–1.03)
SP GR SPEC: 1.01 — SIGNIFICANT CHANGE UP (ref 1–1.03)
TROPONIN I, HIGH SENSITIVITY RESULT: 10.3 NG/L — SIGNIFICANT CHANGE UP
TROPONIN I, HIGH SENSITIVITY RESULT: 10.3 NG/L — SIGNIFICANT CHANGE UP
TROPONIN I, HIGH SENSITIVITY RESULT: 8.8 NG/L — SIGNIFICANT CHANGE UP
TROPONIN I, HIGH SENSITIVITY RESULT: 8.8 NG/L — SIGNIFICANT CHANGE UP
UROBILINOGEN FLD QL: 0.2 MG/DL — SIGNIFICANT CHANGE UP (ref 0.2–1)
UROBILINOGEN FLD QL: 0.2 MG/DL — SIGNIFICANT CHANGE UP (ref 0.2–1)
WBC # BLD: 5.71 K/UL — SIGNIFICANT CHANGE UP (ref 3.8–10.5)
WBC # BLD: 5.71 K/UL — SIGNIFICANT CHANGE UP (ref 3.8–10.5)
WBC # FLD AUTO: 5.71 K/UL — SIGNIFICANT CHANGE UP (ref 3.8–10.5)
WBC # FLD AUTO: 5.71 K/UL — SIGNIFICANT CHANGE UP (ref 3.8–10.5)

## 2023-11-13 PROCEDURE — 99284 EMERGENCY DEPT VISIT MOD MDM: CPT

## 2023-11-13 PROCEDURE — 36415 COLL VENOUS BLD VENIPUNCTURE: CPT

## 2023-11-13 PROCEDURE — 85025 COMPLETE CBC W/AUTO DIFF WBC: CPT

## 2023-11-13 PROCEDURE — 80053 COMPREHEN METABOLIC PANEL: CPT

## 2023-11-13 PROCEDURE — 99285 EMERGENCY DEPT VISIT HI MDM: CPT | Mod: 25

## 2023-11-13 PROCEDURE — 96374 THER/PROPH/DIAG INJ IV PUSH: CPT

## 2023-11-13 PROCEDURE — 85610 PROTHROMBIN TIME: CPT

## 2023-11-13 PROCEDURE — 85730 THROMBOPLASTIN TIME PARTIAL: CPT

## 2023-11-13 PROCEDURE — 93010 ELECTROCARDIOGRAM REPORT: CPT

## 2023-11-13 PROCEDURE — 93005 ELECTROCARDIOGRAM TRACING: CPT

## 2023-11-13 PROCEDURE — 82550 ASSAY OF CK (CPK): CPT

## 2023-11-13 PROCEDURE — 82553 CREATINE MB FRACTION: CPT

## 2023-11-13 PROCEDURE — 83735 ASSAY OF MAGNESIUM: CPT

## 2023-11-13 PROCEDURE — 99285 EMERGENCY DEPT VISIT HI MDM: CPT

## 2023-11-13 PROCEDURE — 84484 ASSAY OF TROPONIN QUANT: CPT

## 2023-11-13 PROCEDURE — 83880 ASSAY OF NATRIURETIC PEPTIDE: CPT

## 2023-11-13 RX ORDER — HYDRALAZINE HCL 50 MG
10 TABLET ORAL ONCE
Refills: 0 | Status: COMPLETED | OUTPATIENT
Start: 2023-11-13 | End: 2023-11-13

## 2023-11-13 RX ORDER — NIFEDIPINE 30 MG
1 TABLET, EXTENDED RELEASE 24 HR ORAL
Qty: 30 | Refills: 0
Start: 2023-11-13 | End: 2023-12-12

## 2023-11-13 RX ADMIN — Medication 10 MILLIGRAM(S): at 09:41

## 2023-11-13 NOTE — CONSULT NOTE ADULT - ATTENDING COMMENTS
67-year-old male with PMH of CAD x4 stents (LAD, RCA in 2018), HTN, HLD, pre-DM, presents to the emergency department complaining of elevated blood pressure over the past 1 to 2 weeks and intermittent dizziness. Consulted for hypertensive urgency    Symptoms are resolving with improvement in BP control  Uncontrolled BP in the setting of dietary indiscretion.   Trop negative x 2. Pro-BNP negative  EKG with NSR, J point elevation and LVH. Unchanged from prior  Would recommend continuing home Telmesartan and Carvedilol (cannot be uptitrated further due to HR).   Would switch Amlodipine to Nifedipine for tighter BP control but would rule out orthostatics first. TTE in 2022 with preserved LV function.   Continue ASA, statin for known CAD  Will follow up with Dr. Frey for repeat TTE and further mgmt

## 2023-11-13 NOTE — ED PROVIDER NOTE - PATIENT PORTAL LINK FT
You can access the FollowMyHealth Patient Portal offered by [facility] by registering at the following website: [email or web address]. By joining [facility]’s FollowMyHealth portal, you will also be able to view your health information using other applications (apps) compatible with our system.

## 2023-11-13 NOTE — CONSULT NOTE ADULT - SUBJECTIVE AND OBJECTIVE BOX
Patient is a 67y old  Male who presents with a chief complaint of dizziness    HPI:  67-year-old male with PMH of CAD x4 stents, HTN, HLD, pre-DM, presents to the emergency department complaining of elevated blood pressure over the past 1 to 2 weeks and intermittent dizziness. Patient states that last night he had upset stomach, some well-formed bowel movements, and increased urination and did not sleep well. Today having more increased dizziness, blood pressure remains elevated 190/91, called cardiology office and was told to come to the emergency department. He describes the symptoms as being similar to how he felt prior to receiving four stents in 2018. Given hydralazine 10mg IVP in ED and blood pressure decreased. EKG showed sinus bradycardia. Patient seen and examined this morning. Denies chest pain, abdominal pain, headache. Admits to head pressure and intermittent increased urination and bowel movements.    Follows Dr. Frey for outpatient cardiologist. Prior TTE in April 2022 was normal. Has a history of four stents: 2 placed in the RCA and 2 placed in the LAD.    PAST MEDICAL & SURGICAL HISTORY:  Hypertension      Fatty liver      HLD (hyperlipidemia)      Renal stones      CAD (coronary artery disease)      Chronic gout, unspecified, with tophus (tophi)      CAD S/P percutaneous coronary angioplasty  2/2018 and 3/2018 (total 4 stents)        FAMILY HISTORY:  Family history of acute myocardial infarction (Father, Sibling, Mother)    Family history of cardiac pacemaker (Father, Sibling)    Family history of cerebrovascular accident (CVA) (Father)      ROS:  Constitutional: denies fever, chills  HEENT: denies blurry vision, difficulty hearing  Respiratory: denies SOB, LANGFORD, cough  Cardiovascular: denies CP, palpitations, orthopnea, PND, LE edema  Gastrointestinal: +intermittent increased bowel movements, denies nausea, vomiting, abdominal pain  Genitourinary: +intermittent increased urinary frequency  Skin: Denies rashes, itching  Neurologic: +head pressure, +dizziness, denies headache, weakness  Hematology/Oncology: denies bleeding, easy bruising  ROS negative except as noted above      SOCIAL HISTORY:    Never smoker  Social drinker about one drink per week  No drug use    Vital Signs Last 24 Hrs  T(C): 36.6 (13 Nov 2023 10:20), Max: 36.7 (13 Nov 2023 08:46)  T(F): 97.8 (13 Nov 2023 10:20), Max: 98 (13 Nov 2023 08:46)  HR: 59 (13 Nov 2023 10:20) (58 - 59)  BP: 159/74 (13 Nov 2023 10:20) (159/74 - 186/94)  BP(mean): --  RR: 18 (13 Nov 2023 10:20) (16 - 18)  SpO2: 97% (13 Nov 2023 10:20) (97% - 97%)    Parameters below as of 13 Nov 2023 08:46  Patient On (Oxygen Delivery Method): room air        Physical Exam:  General: Well developed, well nourished, NAD  HEENT: NCAT, PERRLA, EOMI bl, moist mucous membranes   Neck: Supple, nontender, no mass  Neurology: A&Ox3, nonfocal, sensation intact   Respiratory: CTA B/L, No W/R/R  CV: RRR, +S1/S2, no murmurs, rubs or gallops  Abdominal: Soft, NT, ND +BSx4, no palpable masses  Extremities: No C/C/E, + peripheral pulses  MSK: Normal ROM, no joint erythema or warmth, no joint swelling   Heme: No obvious ecchymosis or petechiae   Skin: warm, dry, normal color      ECG:    I&O's Detail      LABS:                        13.8   5.71  )-----------( 186      ( 13 Nov 2023 09:00 )             39.3     11-13    140  |  107  |  15  ----------------------------<  126<H>  4.1   |  27  |  1.10    Ca    8.9      13 Nov 2023 09:00  Mg     2.0     11-13    TPro  7.5  /  Alb  3.9  /  TBili  1.5<H>  /  DBili  x   /  AST  35  /  ALT  68  /  AlkPhos  69  11-13    CARDIAC MARKERS ( 13 Nov 2023 09:00 )  x     / x     / x     / x     / <1.0 ng/mL      PT/INR - ( 13 Nov 2023 09:00 )   PT: 10.6 sec;   INR: 0.90 ratio         PTT - ( 13 Nov 2023 09:00 )  PTT:31.1 sec  Urinalysis Basic - ( 13 Nov 2023 09:00 )    Color: x / Appearance: x / SG: x / pH: x  Gluc: 126 mg/dL / Ketone: x  / Bili: x / Urobili: x   Blood: x / Protein: x / Nitrite: x   Leuk Esterase: x / RBC: x / WBC x   Sq Epi: x / Non Sq Epi: x / Bacteria: x      I&O's Summary    BNP  RADIOLOGY & ADDITIONAL STUDIES: Patient is a 67y old  Male who presents with a chief complaint of dizziness    HPI:  67-year-old male with PMH of CAD x4 stents, HTN, HLD, pre-DM, presents to the emergency department complaining of elevated blood pressure over the past 1 to 2 weeks and intermittent dizziness. Patient states that last night he had upset stomach, some well-formed bowel movements, and increased urination and did not sleep well. Today having more increased dizziness, blood pressure remains elevated 190/91, called cardiology office and was told to come to the emergency department. He describes the symptoms as being similar to how he felt prior to receiving four stents in 2018. Given hydralazine 10mg IVP in ED and blood pressure decreased. EKG showed sinus bradycardia. Patient seen and examined this morning. Denies chest pain, abdominal pain, headache. Admits to head pressure and intermittent increased urination and bowel movements.    Follows Dr. Frey for outpatient cardiologist. Prior TTE in April 2022 was normal. Has a history of four stents: 2 placed in the RCA and 2 placed in the LAD.    PAST MEDICAL & SURGICAL HISTORY:  Hypertension      Fatty liver      HLD (hyperlipidemia)      Renal stones      CAD (coronary artery disease)      Chronic gout, unspecified, with tophus (tophi)      CAD S/P percutaneous coronary angioplasty  2/2018 and 3/2018 (total 4 stents)        FAMILY HISTORY:  Family history of acute myocardial infarction (Father, Sibling, Mother)    Family history of cardiac pacemaker (Father, Sibling)    Family history of cerebrovascular accident (CVA) (Father)      ROS:  Constitutional: denies fever, chills  HEENT: denies blurry vision, difficulty hearing  Respiratory: denies SOB, LANGFORD, cough  Cardiovascular: denies CP, palpitations, orthopnea, PND, LE edema  Gastrointestinal: +intermittent increased bowel movements, denies nausea, vomiting, abdominal pain  Genitourinary: +intermittent increased urinary frequency  Skin: Denies rashes, itching  Neurologic: +head pressure, +dizziness, denies headache, weakness  Hematology/Oncology: denies bleeding, easy bruising  ROS negative except as noted above      SOCIAL HISTORY:    Never smoker  Social drinker about one drink per week  No drug use    Vital Signs Last 24 Hrs  T(C): 36.6 (13 Nov 2023 10:20), Max: 36.7 (13 Nov 2023 08:46)  T(F): 97.8 (13 Nov 2023 10:20), Max: 98 (13 Nov 2023 08:46)  HR: 59 (13 Nov 2023 10:20) (58 - 59)  BP: 159/74 (13 Nov 2023 10:20) (159/74 - 186/94)  BP(mean): --  RR: 18 (13 Nov 2023 10:20) (16 - 18)  SpO2: 97% (13 Nov 2023 10:20) (97% - 97%)    Parameters below as of 13 Nov 2023 08:46  Patient On (Oxygen Delivery Method): room air        Physical Exam:  General: Well developed, well nourished, NAD  HEENT: NCAT, PERRLA, EOMI bl, moist mucous membranes   Neck: Supple, nontender, no mass  Neurology: A&Ox3, nonfocal, sensation intact   Respiratory: CTA B/L, No W/R/R  CV: RRR, +S1/S2, no murmurs, rubs or gallops  Abdominal: Soft, NT, ND +BSx4, no palpable masses  Extremities: No C/C + peripheral pulses, +1 pitting edema B/L  MSK: Normal ROM, no joint erythema or warmth, no joint swelling   Heme: No obvious ecchymosis or petechiae   Skin: warm, dry, normal color      ECG: Sinus Bradycardia, 59BPM      LABS:                        13.8   5.71  )-----------( 186      ( 13 Nov 2023 09:00 )             39.3     11-13    140  |  107  |  15  ----------------------------<  126<H>  4.1   |  27  |  1.10    Ca    8.9      13 Nov 2023 09:00  Mg     2.0     11-13    TPro  7.5  /  Alb  3.9  /  TBili  1.5<H>  /  DBili  x   /  AST  35  /  ALT  68  /  AlkPhos  69  11-13    CARDIAC MARKERS ( 13 Nov 2023 09:00 )  x     / x     / x     / x     / <1.0 ng/mL      PT/INR - ( 13 Nov 2023 09:00 )   PT: 10.6 sec;   INR: 0.90 ratio         PTT - ( 13 Nov 2023 09:00 )  PTT:31.1 sec  Urinalysis Basic - ( 13 Nov 2023 09:00 )    Color: x / Appearance: x / SG: x / pH: x  Gluc: 126 mg/dL / Ketone: x  / Bili: x / Urobili: x   Blood: x / Protein: x / Nitrite: x   Leuk Esterase: x / RBC: x / WBC x   Sq Epi: x / Non Sq Epi: x / Bacteria: x      BNP: 78   Patient is a 67y old  Male who presents with a chief complaint of dizziness    HPI:  67-year-old male with PMH of CAD x4 stents, HTN, HLD, pre-DM, presents to the emergency department complaining of elevated blood pressure over the past 2 weeks and intermittent dizziness. Patient states that last night he had upset stomach, some well-formed bowel movements, and increased urination and did not sleep well. Today having more increased dizziness, blood pressure remains elevated 190/91, called cardiology office and was told to come to the emergency department. He describes the symptoms as being similar to how he felt prior to receiving four stents in 2018. Given hydralazine 10mg IVP in ED and blood pressure decreased. EKG showed sinus bradycardia. Patient seen and examined this morning. Pt describes dizziness as lightheadedness but denies feelings of the room spinning Denies chest pain, abdominal pain, headache. Admits to head pressure and intermittent increased urination and bowel movements.     Follows Dr. Frey for outpatient cardiologist. Prior TTE in April 2022 was normal. Has a history of four stents: 2 placed in the RCA and 2 placed in the LAD in 2018.    PAST MEDICAL & SURGICAL HISTORY:  Hypertension      Fatty liver      HLD (hyperlipidemia)      Renal stones      CAD (coronary artery disease)      Chronic gout, unspecified, with tophus (tophi)      CAD S/P percutaneous coronary angioplasty  2/2018 and 3/2018 (total 4 stents)        FAMILY HISTORY:  Family history of acute myocardial infarction (Father, Sibling, Mother)    Family history of cardiac pacemaker (Father, Sibling)    Family history of cerebrovascular accident (CVA) (Father)      ROS:  Constitutional: denies fever, chills  HEENT: denies blurry vision, difficulty hearing  Respiratory: denies SOB, LANGFORD, cough  Cardiovascular: denies CP, palpitations, orthopnea, PND, LE edema  Gastrointestinal: +intermittent increased bowel movements, denies nausea, vomiting, abdominal pain  Genitourinary: +intermittent increased urinary frequency  Skin: Denies rashes, itching  Neurologic: +head pressure, +dizziness, denies headache, weakness  Hematology/Oncology: denies bleeding, easy bruising  ROS negative except as noted above      SOCIAL HISTORY:    Never smoker  Social drinker about one drink per week  No drug use    Vital Signs Last 24 Hrs  T(C): 36.6 (13 Nov 2023 10:20), Max: 36.7 (13 Nov 2023 08:46)  T(F): 97.8 (13 Nov 2023 10:20), Max: 98 (13 Nov 2023 08:46)  HR: 59 (13 Nov 2023 10:20) (58 - 59)  BP: 159/74 (13 Nov 2023 10:20) (159/74 - 186/94)  BP(mean): --  RR: 18 (13 Nov 2023 10:20) (16 - 18)  SpO2: 97% (13 Nov 2023 10:20) (97% - 97%)    Parameters below as of 13 Nov 2023 08:46  Patient On (Oxygen Delivery Method): room air        Physical Exam:  General: Well developed, well nourished, NAD  HEENT: NCAT, PERRLA, EOMI bl, moist mucous membranes   Neck: Supple, nontender, no mass  Neurology: A&Ox3, nonfocal, sensation intact   Respiratory: CTA B/L, No W/R/R  CV: RRR, +S1/S2, no murmurs, rubs or gallops  Abdominal: Soft, NT, ND +BSx4, no palpable masses  Extremities: No C/C + peripheral pulses, +1 pitting edema B/L  MSK: Normal ROM, no joint erythema or warmth, no joint swelling   Heme: No obvious ecchymosis or petechiae   Skin: warm, dry, normal color      ECG: Sinus Bradycardia, 59BPM      LABS:                        13.8   5.71  )-----------( 186      ( 13 Nov 2023 09:00 )             39.3     11-13    140  |  107  |  15  ----------------------------<  126<H>  4.1   |  27  |  1.10    Ca    8.9      13 Nov 2023 09:00  Mg     2.0     11-13    TPro  7.5  /  Alb  3.9  /  TBili  1.5<H>  /  DBili  x   /  AST  35  /  ALT  68  /  AlkPhos  69  11-13    CARDIAC MARKERS ( 13 Nov 2023 09:00 )  x     / x     / x     / x     / <1.0 ng/mL      PT/INR - ( 13 Nov 2023 09:00 )   PT: 10.6 sec;   INR: 0.90 ratio         PTT - ( 13 Nov 2023 09:00 )  PTT:31.1 sec  Urinalysis Basic - ( 13 Nov 2023 09:00 )    Color: x / Appearance: x / SG: x / pH: x  Gluc: 126 mg/dL / Ketone: x  / Bili: x / Urobili: x   Blood: x / Protein: x / Nitrite: x   Leuk Esterase: x / RBC: x / WBC x   Sq Epi: x / Non Sq Epi: x / Bacteria: x      BNP: 78

## 2023-11-13 NOTE — ED ADULT NURSE NOTE - OBJECTIVE STATEMENT
received pt c/o intermittent symptoms of vertigo x 2 weeks.   Pt denies recent cough/fevers/illness/trauma/injury.   Reports his bp has been elevated and HR has been in the 50s over the last week, pt has been consulting with cardiology but has not yet had

## 2023-11-13 NOTE — ED ADULT TRIAGE NOTE - CHIEF COMPLAINT QUOTE
c/o Elevated BP for about 2 weeks now and dizziness for about a week ,feels like room is spinning and some pressure behind his eyes

## 2023-11-13 NOTE — ED PROVIDER NOTE - CARE PROVIDER_API CALL
Jett Frey  Cardiology  [address] [place] [address], [address] [place] [id number]  Phone: [phone]  Fax: [phone]  Follow Up Time:

## 2023-11-13 NOTE — ED ADULT NURSE NOTE - NSFALLRISKINTERV_ED_ALL_ED

## 2023-11-13 NOTE — CONSULT NOTE ADULT - ASSESSMENT
- Patient is   - No clear evidence of acute ischemia, trops negative x 2. Will follow up third set.  - His CKs are flat, suggesting against acute atherosclerotic plaque rupture.  - Biomarker trend is not consistent with plaque rupture but rather demand ischemia. Monitor closely for the development of anginal symptoms or clinical signs of ischemia.   - No acute changes on EKG compared to previous.  - No meaningful evidence of volume overload.  - Previous TTE shows ___.  - BP well controlled, monitor routine hemodynamics.  - Continue ___.  - Monitor and replete lytes, keep K>4, Mg>2.  - Strict I/Os, daily weights.  - Pt has no active ischemia, decompensated heart failure, unstable arrythmia, or severe stenotic valvular disease, and has __ cardiac risk factors. In the setting of low risk ___, he/she is optimized from cardiovascular standpoint to proceed with planned procedure with routine hemodynamic monitoring.   - Pt has no modifiable active cardiac risk factors and in the setting of low risk _____, patient is optimized as best as possible from cardiovascular standpoint to proceed with planned procedure with routine hemodynamic monitoring.  - Other cardiovascular workup will depend on clinical course.  - All other workup per primary team.  - Will continue to follow.             67-year-old male with PMH of CAD x4 stents, HTN, HLD, pre-DM, presents to the emergency department complaining of elevated blood pressure over the past 1 to 2 weeks and intermittent dizziness. Consulted for hypertensive urgency      #Hypertensive urgency / dizziness  - No clear evidence of acute ischemia, trop negative x 1, f/u repeat trop results  - EKG: Sinus bradycardia, 59BPM  - BNP: 78  - Previous TTE (4/2020) showed normal LV function  - BP now stable with symptom improvement s/p hydralazine 10mg IVP x 1  - Dizziness likely secondary to hypertension vs. vasovagal in the setting of dehydration  - Recommend switching home amlodipine to nifedipine 60mg qd PO  - Continue home telmisartan and carvedilol.  - F/u orthostatics  - If trop negative x 2, then patient clear for discharge from cardiac standpoint with close outpatient follow up.             67-year-old male with PMH of CAD x4 stents, HTN, HLD, pre-DM, presents to the emergency department complaining of elevated blood pressure over the past 1 to 2 weeks and intermittent dizziness. Consulted for hypertensive urgency    #Hypertensive urgency / dizziness  - No clear evidence of acute ischemia, trop negative x 1, f/u repeat trop results  - EKG: Sinus bradycardia, 59BPM  - BNP: 78  - Previous TTE (4/2020) showed normal LV function  - BP now stable with symptom improvement s/p hydralazine 10mg IVP x 1  - Dizziness likely secondary to hypertension vs. vasovagal in the setting of dehydration  - Recommend switching home amlodipine to nifedipine 60mg qd PO  - Continue home telmisartan and carvedilol.  - F/u orthostatics  - If trop negative x 2, then patient clear for discharge from cardiac standpoint with close outpatient follow up.

## 2023-11-13 NOTE — ED PROVIDER NOTE - NSFOLLOWUPINSTRUCTIONS_ED_ALL_ED_FT
Follow-up with your cardiologist this week call to make an appointment.  Stop taking amlodipine and start nicardipine 60 mg once daily, take 1 dose when you get home today, and then start in the morning with your carvedilol.    Hypertension, also called high blood pressure, is when the force of the blood pressing against the walls of the arteries is too strong. Arteries are blood vessels that carry blood from your heart throughout your body. Hypertension forces the heart to work harder to pump blood and may cause the arteries to become narrow or stiff.    Understanding blood pressure readings  A blood pressure reading includes a higher number over a lower number:  The first, or top, number is called the systolic pressure. It is a measure of the pressure in your arteries as your heart beats.  The second, or bottom number, is called the diastolic pressure. It is a measure of the pressure in your arteries as the heart relaxes.  For most people, a normal blood pressure is below 120/80. Your personal target blood pressure may vary depending on your medical conditions, your age, and other factors.    Blood pressure is classified into four stages. Based on your blood pressure reading, your health care provider may use the following stages to determine what type of treatment you need, if any. Systolic pressure and diastolic pressure are measured in a unit called millimeters of mercury (mmHg).    Normal    Systolic pressure: below 120.  Diastolic pressure: below 80.  Elevated    Systolic pressure: 120–129.  Diastolic pressure: below 80.  Hypertension stage 1    Systolic pressure: 130–139.  Diastolic pressure: 80–89.  Hypertension stage 2    Systolic pressure: 140 or above.  Diastolic pressure: 90 or above.  How can this condition affect me?  Managing your hypertension is very important. Over time, hypertension can damage the arteries and decrease blood flow to parts of the body, including the brain, heart, and kidneys. Having untreated or uncontrolled hypertension can lead to:  A heart attack.  A stroke.  A weakened blood vessel (aneurysm).  Heart failure.  Kidney damage.  Eye damage.  Memory and concentration problems.  Vascular dementia.  What actions can I take to manage this condition?  Hypertension can be managed by making lifestyle changes and possibly by taking medicines. Your health care provider will help you make a plan to bring your blood pressure within a normal range. You may be referred for counseling on a healthy diet and physical activity.    Nutrition    A plate with examples of foods in a healthy diet.  Eat a diet that is high in fiber and potassium, and low in salt (sodium), added sugar, and fat. An example eating plan is called the DASH diet. DASH stands for Dietary Approaches to Stop Hypertension. To eat this way:  Eat plenty of fresh fruits and vegetables. Try to fill one-half of your plate at each meal with fruits and vegetables.  Eat whole grains, such as whole-wheat pasta, brown rice, or whole-grain bread. Fill about one-fourth of your plate with whole grains.  Eat low-fat dairy products.  Avoid fatty cuts of meat, processed or cured meats, and poultry with skin. Fill about one-fourth of your plate with lean proteins such as fish, chicken without skin, beans, eggs, and tofu.  Avoid pre-made and processed foods. These tend to be higher in sodium, added sugar, and fat.  Reduce your daily sodium intake. Many people with hypertension should eat less than 1,500 mg of sodium a day.  Lifestyle    A person riding a bicycle wearing a safety helmet.  Work with your health care provider to maintain a healthy body weight or to lose weight. Ask what an ideal weight is for you.  Get at least 30 minutes of exercise that causes your heart to beat faster (aerobic exercise) most days of the week. Activities may include walking, swimming, or biking.  Include exercise to strengthen your muscles (resistance exercise), such as weight lifting, as part of your weekly exercise routine. Try to do these types of exercises for 30 minutes at least 3 days a week.  Do not use any products that contain nicotine or tobacco. These products include cigarettes, chewing tobacco, and vaping devices, such as e-cigarettes. If you need help quitting, ask your health care provider.  Control any long-term (chronic) conditions you have, such as high cholesterol or diabetes.  Identify your sources of stress and find ways to manage stress. This may include meditation, deep breathing, or making time for fun activities.  Alcohol use    Do not drink alcohol if:  Your health care provider tells you not to drink.  You are pregnant, may be pregnant, or are planning to become pregnant.  If you drink alcohol:  Limit how much you have to:  0–1 drink a day for women.  0–2 drinks a day for men.  Know how much alcohol is in your drink. In the [place] one drink equals one 12 oz bottle of beer (355 mL), one 5 oz glass of wine (148 mL), or one 1½ oz glass of hard liquor (44 mL).  Medicines    Your health care provider may prescribe medicine if lifestyle changes are not enough to get your blood pressure under control and if:  Your systolic blood pressure is 130 or higher.  Your diastolic blood pressure is 80 or higher.  Take medicines only as told by your health care provider. Follow the directions carefully. Blood pressure medicines must be taken as told by your health care provider. The medicine does not work as well when you skip doses. Skipping doses also puts you at risk for problems.    Monitoring    A person checking his or her blood pressure.   Before you monitor your blood pressure:  Do not smoke, drink caffeinated beverages, or exercise within 30 minutes before taking a measurement.  Use the bathroom and empty your bladder (urinate).  Sit quietly for at least 5 minutes before taking measurements.  Monitor your blood pressure at home as told by your health care provider. To do this:  Sit with your back straight and supported.  Place your feet flat on the floor. Do not cross your legs.  Support your arm on a flat surface, such as a table. Make sure your upper arm is at heart level.  Each time you measure, take two or three readings one minute apart and record the results.  You may also need to have your blood pressure checked regularly by your health care provider.    General information    Talk with your health care provider about your diet, exercise habits, and other lifestyle factors that may be contributing to hypertension.  Review all the medicines you take with your health care provider because there may be side effects or interactions.  Keep all follow-up visits. Your health care provider can help you create and adjust your plan for managing your high blood pressure.  Where to find more information  National Heart, Lung, and [employer] [place]: www.nhlbi.nih.gov  American Heart Association: www.heart.org  Contact a health care provider if:  You think you are having a reaction to medicines you have taken.  You have repeated (recurrent) headaches.  You feel dizzy.  You have swelling in your ankles.  You have trouble with your vision.  Get help right away if:  You develop a severe headache or confusion.  You have unusual weakness or numbness, or you feel faint.  You have severe pain in your chest or abdomen.  You vomit repeatedly.  You have trouble breathing.  These symptoms may be an emergency. Get help right away. Call 911.  Do not wait to see if the symptoms will go away.  Do not drive yourself to the hospital.  Summary  Hypertension is when the force of blood pumping through your arteries is too strong. If this condition is not controlled, it may put you at risk for serious complications.  Your personal target blood pressure may vary depending on your medical conditions, your age, and other factors. For most people, a normal blood pressure is less than 120/80.  Hypertension is managed by lifestyle changes, medicines, or both.  Lifestyle changes to help manage hypertension include losing weight, eating a healthy, low-sodium diet, exercising more, stopping smoking, and limiting alcohol.  This information is not intended to replace advice given to you by your health care provider. Make sure you discuss any questions you have with your health care provider.    Document Revised: 09/01/2022 Document Reviewed: 09/01/2022

## 2023-11-13 NOTE — ED PROVIDER NOTE - PROGRESS NOTE DETAILS
Patient seen and evaluated by cardiology, orthostatics done, second set of cardiac enzymes done, plan is to change amlodipine to nicardipine, patient will follow-up with his own cardiologist this week for a repeat echo

## 2023-11-13 NOTE — ED PROVIDER NOTE - OBJECTIVE STATEMENT
67-year-old male with PMH of CAD x4 stents, HTN, HLD, pre-DM, presents to the emergency department complaining of elevated blood pressure over the past 1 to 2 weeks, intermittent dizziness, patient states that last night he had upset stomach some bowel movements and increased urination, did not sleep well, today having more increased dizziness, blood pressure remains elevated 190/91, called cardiology office and was told to come to the emergency department.  Patient states that he is currently on Norvasc 5 mg, aspirin 81 mg, Lipitor, Coreg 12 and half milligrams twice daily, telmisartan 80 mg at bedtime.  Patient took his Norvasc and Coreg prior to coming.

## 2023-11-13 NOTE — ED PROVIDER NOTE - CLINICAL SUMMARY MEDICAL DECISION MAKING FREE TEXT BOX
67-year-old male with elevated blood pressure and dizziness, follow-up CBC, CMP, cardiac enzymes, EKG, placed on cardiac monitor, give antihypertensives as needed, cardio consult. show

## 2023-11-15 ENCOUNTER — APPOINTMENT (OUTPATIENT)
Dept: CARDIOLOGY | Facility: CLINIC | Age: 68
End: 2023-11-15
Payer: COMMERCIAL

## 2023-11-15 ENCOUNTER — NON-APPOINTMENT (OUTPATIENT)
Age: 68
End: 2023-11-15

## 2023-11-15 VITALS
OXYGEN SATURATION: 97 % | DIASTOLIC BLOOD PRESSURE: 80 MMHG | SYSTOLIC BLOOD PRESSURE: 148 MMHG | WEIGHT: 228 LBS | HEIGHT: 70 IN | HEART RATE: 55 BPM | BODY MASS INDEX: 32.64 KG/M2

## 2023-11-15 PROCEDURE — 99214 OFFICE O/P EST MOD 30 MIN: CPT | Mod: 25

## 2023-11-15 PROCEDURE — 93000 ELECTROCARDIOGRAM COMPLETE: CPT

## 2023-11-15 RX ORDER — AMLODIPINE BESYLATE 5 MG/1
5 TABLET ORAL
Qty: 90 | Refills: 3 | Status: COMPLETED | COMMUNITY
Start: 2019-09-20 | End: 2023-11-15

## 2023-12-29 NOTE — DISCUSSION/SUMMARY
[FreeTextEntry1] : 1. Start carvedilol 12.5 mg bid. Continue other anti htn medications.\par  2. Instructed to check daily BP.\par  3. Follow up in 4 months.

## 2024-01-03 ENCOUNTER — APPOINTMENT (OUTPATIENT)
Dept: CARDIOLOGY | Facility: CLINIC | Age: 69
End: 2024-01-03
Payer: COMMERCIAL

## 2024-01-03 PROCEDURE — 99214 OFFICE O/P EST MOD 30 MIN: CPT | Mod: 95

## 2024-01-03 RX ORDER — CARVEDILOL 6.25 MG/1
6.25 TABLET, FILM COATED ORAL TWICE DAILY
Qty: 180 | Refills: 3 | Status: ACTIVE | COMMUNITY
Start: 2022-04-12 | End: 1900-01-01

## 2024-01-03 NOTE — DISCUSSION/SUMMARY
[FreeTextEntry1] : 1. Decrease carvedilol to 6.25 mg bid. Continue other anti htn medications. 2. Instructed to check daily BP. 3. Follow up in 4 months. [EKG obtained to assist in diagnosis and management of assessed problem(s)] : EKG obtained to assist in diagnosis and management of assessed problem(s)

## 2024-01-03 NOTE — HISTORY OF PRESENT ILLNESS
[FreeTextEntry1] : Mr. Prado is a 69 year-old man with known hypertension, hyperlipidemia and CAD with recent cath demonstrating LAD/Diag disease thus had PCI, successfully. He is doing well. He is having a knee cyst removed. He was recently hospitalized for this vague ascending weakness and heat sensation, no syncope and increase in frequency of urination. He is doing well with no active cardiac symptoms. Blood pressure sub-optimal but otherwise no issues reported.   Follow-up - stable from cardiac standpoint. No anginal symptoms. BP elevated.

## 2024-04-15 ENCOUNTER — NON-APPOINTMENT (OUTPATIENT)
Age: 69
End: 2024-04-15

## 2024-04-15 ENCOUNTER — APPOINTMENT (OUTPATIENT)
Dept: CARDIOLOGY | Facility: CLINIC | Age: 69
End: 2024-04-15
Payer: COMMERCIAL

## 2024-04-15 VITALS
WEIGHT: 227 LBS | HEIGHT: 70 IN | OXYGEN SATURATION: 97 % | BODY MASS INDEX: 32.5 KG/M2 | DIASTOLIC BLOOD PRESSURE: 84 MMHG | HEART RATE: 57 BPM | SYSTOLIC BLOOD PRESSURE: 154 MMHG

## 2024-04-15 DIAGNOSIS — E78.00 PURE HYPERCHOLESTEROLEMIA, UNSPECIFIED: ICD-10-CM

## 2024-04-15 DIAGNOSIS — I10 ESSENTIAL (PRIMARY) HYPERTENSION: ICD-10-CM

## 2024-04-15 DIAGNOSIS — R00.2 PALPITATIONS: ICD-10-CM

## 2024-04-15 DIAGNOSIS — I25.10 ATHEROSCLEROTIC HEART DISEASE OF NATIVE CORONARY ARTERY W/OUT ANGINA PECTORIS: ICD-10-CM

## 2024-04-15 PROCEDURE — G2211 COMPLEX E/M VISIT ADD ON: CPT

## 2024-04-15 PROCEDURE — 99214 OFFICE O/P EST MOD 30 MIN: CPT

## 2024-04-15 PROCEDURE — 93000 ELECTROCARDIOGRAM COMPLETE: CPT

## 2024-04-15 RX ORDER — TELMISARTAN 40 MG/1
40 TABLET ORAL DAILY
Qty: 90 | Refills: 3 | Status: ACTIVE | COMMUNITY
Start: 2023-05-03

## 2024-04-15 RX ORDER — NIFEDIPINE 30 MG/1
30 TABLET, EXTENDED RELEASE ORAL DAILY
Qty: 90 | Refills: 3 | Status: ACTIVE | COMMUNITY
Start: 2023-11-15

## 2024-05-04 NOTE — ED PROVIDER NOTE - CONDITION AT DISCHARGE:
For information on Fall & Injury Prevention, visit: [email or web address] OR  [email or web address] OR  https://www.cdc.gov/steadi/patient.html
Improved

## 2024-06-13 PROBLEM — R00.2 PALPITATIONS: Status: ACTIVE | Noted: 2020-12-08

## 2024-06-13 PROBLEM — E78.00 HYPERCHOLESTEROLEMIA: Status: ACTIVE | Noted: 2018-02-20

## 2024-06-13 PROBLEM — I25.10 CORONARY ARTERY DISEASE: Status: ACTIVE | Noted: 2018-02-20

## 2024-06-13 PROBLEM — I10 ESSENTIAL HYPERTENSION: Status: ACTIVE | Noted: 2018-02-20

## 2024-06-13 NOTE — HISTORY OF PRESENT ILLNESS
[FreeTextEntry1] : Mr. Prado is a 68 year-old man with known hypertension, hyperlipidemia and CAD with recent cath demonstrating LAD/Diag disease thus had PCI, successfully. He is doing well. He is having a knee cyst removed. He was recently hospitalized for this vague ascending weakness and heat sensation, no syncope and increase in frequency of urination. He is doing well with no active cardiac symptoms. Blood pressure sub-optimal but otherwise no issues reported.   Follow-up - stable from cardiac standpoint. No anginal symptoms. BP elevated.

## 2024-08-11 ENCOUNTER — NON-APPOINTMENT (OUTPATIENT)
Age: 69
End: 2024-08-11

## 2024-08-12 ENCOUNTER — APPOINTMENT (OUTPATIENT)
Dept: CARDIOLOGY | Facility: CLINIC | Age: 69
End: 2024-08-12

## 2024-08-12 ENCOUNTER — NON-APPOINTMENT (OUTPATIENT)
Age: 69
End: 2024-08-12

## 2024-08-12 VITALS
BODY MASS INDEX: 32.28 KG/M2 | DIASTOLIC BLOOD PRESSURE: 81 MMHG | WEIGHT: 225 LBS | SYSTOLIC BLOOD PRESSURE: 169 MMHG | OXYGEN SATURATION: 97 % | HEART RATE: 60 BPM

## 2024-08-12 VITALS — SYSTOLIC BLOOD PRESSURE: 154 MMHG | DIASTOLIC BLOOD PRESSURE: 74 MMHG

## 2024-08-12 PROCEDURE — G2211 COMPLEX E/M VISIT ADD ON: CPT | Mod: NC

## 2024-08-12 PROCEDURE — 93000 ELECTROCARDIOGRAM COMPLETE: CPT

## 2024-08-12 PROCEDURE — 99214 OFFICE O/P EST MOD 30 MIN: CPT

## 2024-08-26 NOTE — ED PROVIDER NOTE - IV ALTEPLASE ADMIN OUTSIDE HIDDEN
----- Message from Niyah Uriostegui MD sent at 8/23/2024 10:34 PM CDT -----  Negative quantiferon  Please fax result to JANES cassidy   show

## 2024-09-27 ENCOUNTER — APPOINTMENT (OUTPATIENT)
Dept: CV DIAGNOSITCS | Facility: HOSPITAL | Age: 69
End: 2024-09-27

## 2024-10-30 ENCOUNTER — RESULT REVIEW (OUTPATIENT)
Age: 69
End: 2024-10-30

## 2024-10-30 ENCOUNTER — OUTPATIENT (OUTPATIENT)
Dept: OUTPATIENT SERVICES | Facility: HOSPITAL | Age: 69
LOS: 1 days | End: 2024-10-30
Payer: COMMERCIAL

## 2024-10-30 ENCOUNTER — APPOINTMENT (OUTPATIENT)
Dept: CV DIAGNOSITCS | Facility: HOSPITAL | Age: 69
End: 2024-10-30

## 2024-10-30 DIAGNOSIS — I25.10 ATHEROSCLEROTIC HEART DISEASE OF NATIVE CORONARY ARTERY WITHOUT ANGINA PECTORIS: ICD-10-CM

## 2024-10-30 DIAGNOSIS — I25.10 ATHEROSCLEROTIC HEART DISEASE OF NATIVE CORONARY ARTERY WITHOUT ANGINA PECTORIS: Chronic | ICD-10-CM

## 2024-10-30 PROCEDURE — 93306 TTE W/DOPPLER COMPLETE: CPT | Mod: 26

## 2024-10-30 PROCEDURE — 76376 3D RENDER W/INTRP POSTPROCES: CPT

## 2024-10-30 PROCEDURE — 93356 MYOCRD STRAIN IMG SPCKL TRCK: CPT

## 2024-10-30 PROCEDURE — 93306 TTE W/DOPPLER COMPLETE: CPT

## 2024-10-30 PROCEDURE — 76376 3D RENDER W/INTRP POSTPROCES: CPT | Mod: 26

## 2024-11-11 ENCOUNTER — RX RENEWAL (OUTPATIENT)
Age: 69
End: 2024-11-11

## 2024-11-11 RX ORDER — TELMISARTAN 80 MG/1
80 TABLET ORAL
Qty: 90 | Refills: 0 | Status: ACTIVE | COMMUNITY
Start: 2024-11-11 | End: 1900-01-01

## 2024-11-14 NOTE — CONSULT NOTE ADULT - CONSULT REASON
Type 2 diabetes mellitus with diabetic polyneuropathy, with long-term current use of insulin (H) (Primary)  - Hemoglobin A1c; Future     A1c due to repeat on/after 11/16/24.  Please schedule patient for lab only appointment early next week.    Azeb Brink, DO    Cholelithiasis

## 2024-12-16 ENCOUNTER — RX RENEWAL (OUTPATIENT)
Age: 69
End: 2024-12-16

## 2024-12-27 NOTE — ED ADULT NURSE NOTE - NSSEPSISSUSPECTED_ED_A_ED
Continue Prednisolone 2x daily in the RIGHT EYE through Sunday, then discontinue.    Decrease Prednisolone to 1 drop 3x daily (morning, midday, and evening) in the LEFT EYE for one week.    After that week is up, decrease Prednisolone to 1 drop 2x daily (morning and evening) in the LEFT EYE until your next appointment.         
No

## 2025-04-08 ENCOUNTER — NON-APPOINTMENT (OUTPATIENT)
Age: 70
End: 2025-04-08

## 2025-04-09 ENCOUNTER — NON-APPOINTMENT (OUTPATIENT)
Age: 70
End: 2025-04-09

## 2025-04-09 ENCOUNTER — APPOINTMENT (OUTPATIENT)
Dept: CARDIOLOGY | Facility: CLINIC | Age: 70
End: 2025-04-09

## 2025-04-09 VITALS
SYSTOLIC BLOOD PRESSURE: 169 MMHG | DIASTOLIC BLOOD PRESSURE: 85 MMHG | HEIGHT: 70 IN | WEIGHT: 226 LBS | OXYGEN SATURATION: 97 % | HEART RATE: 59 BPM | BODY MASS INDEX: 32.35 KG/M2

## 2025-04-09 PROCEDURE — 99214 OFFICE O/P EST MOD 30 MIN: CPT

## 2025-04-09 PROCEDURE — G2211 COMPLEX E/M VISIT ADD ON: CPT | Mod: NC

## 2025-04-09 PROCEDURE — 93000 ELECTROCARDIOGRAM COMPLETE: CPT

## 2025-05-05 NOTE — H&P PST ADULT - BP NONINVASIVE DIASTOLIC (MM HG)
PHYSICAL THERAPY - MEDICARE DAILY TREATMENT NOTE (updated 3/23)      Date: 2025          Patient Name:  Naveen Alvarez :  1933   Medical   Diagnosis:  Spinal stenosis at L4-L5 level [M48.061] Treatment Diagnosis:  M62.81  GENERAL MUSCLE WEAKNESS, R26.81   Unsteadiness on feet, and R26.89   Abnormalities of gait and mobility    Referral Source:  Mohit Franco MD Insurance:   Payor: MEDICARE / Plan: MEDICARE PART A AND B / Product Type: *No Product type* /                     Patient  verified yes     Visit #   Current  / Total 3 16   Time   In / Out 1400 1445   Total Treatment Time 45   Total Timed Codes 45   1:1 Treatment Time 45      [facility] Totals Reminder:  bill using total billable   min of TIMED therapeutic procedures and modalities.   8-22 min = 1 unit; 23-37 min = 2 units; 38-52 min = 3 units; 53-67 min = 4 units; 68-82 min = 5 units          SUBJECTIVE    Pain Level (0-10 scale): 0    Any medication changes, allergies to medications, adverse drug reactions, diagnosis change, or new procedure performed?: [x] No    [] Yes (see summary sheet for update)  Medications: Verified on Patient Summary List    Subjective functional status/changes:     I am stiff today    OBJECTIVE      Therapeutic Procedures:  Tx Min Billable or 1:1 Min (if diff from Tx Min) Procedure, Rationale, Specifics   20 20 [id number] Therapeutic Exercise (timed):  increase ROM, strength, coordination, balance, and proprioception to improve patient's ability to progress to PLOF and address remaining functional goals. (see flow sheet as applicable)     Details if applicable:    Supine SAQ 1# 3x10  Supine SLR 2x10  Focusing on the quad contraction  Supine with LE on peanut ball bridging 3x10  Supine hip adduction with red ball 30x  Nu step L 2 focusing on knee extension 10'   10 10 [id number] Therapeutic Activity (timed):  use of dynamic activities replicating functional movements to increase ROM, strength, coordination, balance, and 
80

## 2025-08-13 ENCOUNTER — APPOINTMENT (OUTPATIENT)
Dept: CARDIOLOGY | Facility: CLINIC | Age: 70
End: 2025-08-13

## 2025-08-13 VITALS
SYSTOLIC BLOOD PRESSURE: 176 MMHG | DIASTOLIC BLOOD PRESSURE: 108 MMHG | OXYGEN SATURATION: 96 % | HEART RATE: 53 BPM | WEIGHT: 222 LBS | HEIGHT: 70 IN | BODY MASS INDEX: 31.78 KG/M2

## 2025-09-12 RX ORDER — FUROSEMIDE 20 MG/1
20 TABLET ORAL
Refills: 0 | Status: ACTIVE | COMMUNITY
Start: 2025-09-12

## (undated) DEVICE — PACK GENERAL LAPAROSCOPY

## (undated) DEVICE — SHEARS COVIDIEN ENDO SHEAR 5MM X 31CM W UNIPOLAR CAUTERY

## (undated) DEVICE — TROCAR COVIDIEN VERSAONE BLUNT TIP HASSAN 12MM

## (undated) DEVICE — TUBING STRYKER PNEUMOSURE HEATED RTP

## (undated) DEVICE — WARMING BLANKET UPPER ADULT

## (undated) DEVICE — SOL IRR BAG NS 0.9% 3000ML

## (undated) DEVICE — TROCAR COVIDIEN VERSAPORT BLADELESS OPTICAL 11MM STANDARD

## (undated) DEVICE — PLV-STRYKER LAPAROSCOPE 10MM 30 DEGREE: Type: DURABLE MEDICAL EQUIPMENT

## (undated) DEVICE — TROCAR COVIDIEN VERSAPORT BLADELESS OPTICAL 5MM STANDARD

## (undated) DEVICE — VENODYNE/SCD SLEEVE CALF LARGE

## (undated) DEVICE — SOL IRR POUR NS 0.9% 1000ML

## (undated) DEVICE — Device

## (undated) DEVICE — GLV 8 PROTEXIS (WHITE)

## (undated) DEVICE — DRAPE 3/4 SHEET W REINFORCEMENT 56X77"

## (undated) DEVICE — DRSG BANDAID 0.75X3"

## (undated) DEVICE — PLV-SCD MACHINE: Type: DURABLE MEDICAL EQUIPMENT

## (undated) DEVICE — TROCAR COVIDIEN VERSAONE FIXATION CANNULA 5MM

## (undated) DEVICE — DRSG STERISTRIPS 0.5 X 4"

## (undated) DEVICE — PLV/PSP-SUCTION IRRIGATOR STRYKER: Type: DURABLE MEDICAL EQUIPMENT

## (undated) DEVICE — ENDOCATCH 10MM SPECIMEN POUCH

## (undated) DEVICE — SUT POLYSORB 0 30" GU-46

## (undated) DEVICE — SUT POLYSORB 4-0 18" P-12 UNDYED

## (undated) DEVICE — DRSG MASTISOL

## (undated) DEVICE — SPONGE ENDO PEANUT 5MM

## (undated) DEVICE — DRAPE TOWEL BLUE 17" X 24"

## (undated) DEVICE — VENODYNE/SCD SLEEVE CALF MEDIUM

## (undated) DEVICE — BLADE SCALPEL SAFETYLOCK #15